# Patient Record
Sex: FEMALE | Race: BLACK OR AFRICAN AMERICAN | NOT HISPANIC OR LATINO | Employment: FULL TIME | URBAN - METROPOLITAN AREA
[De-identification: names, ages, dates, MRNs, and addresses within clinical notes are randomized per-mention and may not be internally consistent; named-entity substitution may affect disease eponyms.]

---

## 2018-09-12 ENCOUNTER — HOSPITAL ENCOUNTER (EMERGENCY)
Facility: HOSPITAL | Age: 38
Discharge: HOME | End: 2018-09-12
Attending: EMERGENCY MEDICINE
Payer: MEDICAID

## 2018-09-12 VITALS
HEIGHT: 62 IN | DIASTOLIC BLOOD PRESSURE: 73 MMHG | HEART RATE: 90 BPM | RESPIRATION RATE: 20 BRPM | WEIGHT: 135 LBS | BODY MASS INDEX: 24.84 KG/M2 | OXYGEN SATURATION: 97 % | SYSTOLIC BLOOD PRESSURE: 108 MMHG | TEMPERATURE: 98 F

## 2018-09-12 DIAGNOSIS — M25.539 WRIST PAIN: ICD-10-CM

## 2018-09-12 LAB
B-HCG UR QL: NEGATIVE
CTP QC/QA: YES

## 2018-09-12 PROCEDURE — 25000003 PHARM REV CODE 250: Performed by: PHYSICIAN ASSISTANT

## 2018-09-12 PROCEDURE — 99283 EMERGENCY DEPT VISIT LOW MDM: CPT | Mod: 25

## 2018-09-12 PROCEDURE — 81025 URINE PREGNANCY TEST: CPT | Performed by: NURSE PRACTITIONER

## 2018-09-12 RX ORDER — IBUPROFEN 400 MG/1
400 TABLET ORAL
Status: COMPLETED | OUTPATIENT
Start: 2018-09-12 | End: 2018-09-12

## 2018-09-12 RX ORDER — ACETAMINOPHEN 500 MG
500 TABLET ORAL EVERY 6 HOURS PRN
COMMUNITY
End: 2020-05-26 | Stop reason: SDUPTHER

## 2018-09-12 RX ADMIN — IBUPROFEN 400 MG: 400 TABLET ORAL at 03:09

## 2018-09-12 NOTE — DISCHARGE INSTRUCTIONS
Please wear your splint on the wrist to provide relief of pain.    You can also take Ibuprofen or Naproxen for pain.    Follow-up with hand surgery for further evaluation for carpal tunnel syndrome.    Return to the ER for any concerns.

## 2018-09-12 NOTE — ED TRIAGE NOTES
Pt states the pain in her right hand has been going on for the last 2 month. Today her right thumb and wrist hurts bad.

## 2018-09-12 NOTE — ED PROVIDER NOTES
Encounter Date: 9/12/2018    SCRIBE #1 NOTE: IMart am scribing for, and in the presence of,  Dayana Smith PA-C. I have scribed the following portions of the note - Other sections scribed: HPI, ROS.       History     Chief Complaint   Patient presents with    Wrist Pain     Pt reports pain and swelling to right wrist x2 months. Pt states she does a lot of lifting for her job. Took tylenol 3hrs pta     CC: Wrist Pain    HPI: 38 year old female  has no past medical history on file presents to the ED for evaluation of a 2 month history of right sided wrist pain that radiates to 1st and 2nd metacarpal. Pt reports pain is exacerbated with certain movements. 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 Pt has been using Tylenol Extra Strength w/ no relief. No other symptoms reported.       The history is provided by the patient. No  was used.     Review of patient's allergies indicates:  No Known Allergies  History reviewed. No pertinent past medical history.  History reviewed. No pertinent surgical history.  History reviewed. No pertinent family history.  Social History     Tobacco Use    Smoking status: Current Every Day Smoker     Packs/day: 1.00     Types: Cigarettes    Smokeless tobacco: Never Used    Tobacco comment: 1 pack of cigarettes will last the pt 2 days.   Substance Use Topics    Alcohol use: No     Frequency: Never    Drug use: No     Review of Systems   Constitutional: Negative for chills and fever.   HENT: Negative for congestion, ear pain and sore throat.    Eyes: Negative for pain.   Respiratory: Negative for shortness of breath.    Cardiovascular: Negative for chest pain.   Gastrointestinal: Negative for abdominal pain, constipation, diarrhea and nausea.   Genitourinary: Negative for decreased urine volume, dysuria, vaginal bleeding and vaginal discharge.   Musculoskeletal: Negative for back pain and neck pain.        (+) right wrist pain that radiates to  1st and 2nd metacarpals   Skin: Negative for rash.   Neurological: Negative for weakness.   Hematological: Does not bruise/bleed easily.   Psychiatric/Behavioral: Negative for confusion.       Physical Exam     Initial Vitals [09/12/18 1505]   BP Pulse Resp Temp SpO2   102/64 (!) 115 17 98.4 °F (36.9 °C) 98 %      MAP       --         Physical Exam    Nursing note and vitals reviewed.  Constitutional: Vital signs are normal. She appears well-developed and well-nourished. She is not diaphoretic. She is cooperative.  Non-toxic appearance. She does not have a sickly appearance. She does not appear ill. No distress.   HENT:   Head: Normocephalic and atraumatic.   Right Ear: Tympanic membrane, external ear and ear canal normal.   Left Ear: Tympanic membrane, external ear and ear canal normal.   Nose: Nose normal.   Mouth/Throat: Uvula is midline, oropharynx is clear and moist and mucous membranes are normal. No oropharyngeal exudate.   Eyes: Conjunctivae, EOM and lids are normal. Pupils are equal, round, and reactive to light.   Neck: Trachea normal, normal range of motion, full passive range of motion without pain and phonation normal. Neck supple.   Cardiovascular: Normal rate, regular rhythm, normal heart sounds and intact distal pulses. Exam reveals no gallop and no friction rub.    No murmur heard.  Pulses:       Radial pulses are 2+ on the right side, and 2+ on the left side.   Pulmonary/Chest: Effort normal and breath sounds normal. No respiratory distress. She has no decreased breath sounds. She has no wheezes. She has no rhonchi. She has no rales.   Abdominal: Soft. Normal appearance and bowel sounds are normal. She exhibits no distension and no mass. There is no tenderness.   Musculoskeletal: Normal range of motion.        Right wrist: She exhibits normal range of motion, no tenderness, no bony tenderness, no swelling, no effusion, no crepitus, no deformity and no laceration.        Left wrist: Normal.   No  tenderness to palpation or with ROM of the right hand or wrist. Full ROM of bilateral upper extremities. No swelling or obvious deformities. No crepitus or effusion. No laceration. Peripheral pulses intact. Strength and sensation intact. + Phalen's test.   Neurological: She is alert and oriented to person, place, and time. She has normal strength.   Skin: Skin is warm and dry. Capillary refill takes less than 2 seconds. No rash noted.   Psychiatric: She has a normal mood and affect. Her speech is normal and behavior is normal. Judgment and thought content normal. Cognition and memory are normal.         ED Course   Procedures  Labs Reviewed   POCT URINE PREGNANCY          Imaging Results          X-Ray Wrist Complete Right (Final result)  Result time 09/12/18 16:05:24    Final result by Cali Gonzalez MD (09/12/18 16:05:24)                 Impression:      No acute process.      Electronically signed by: Cali Gonzalez MD  Date:    09/12/2018  Time:    16:05             Narrative:    EXAMINATION:  XR WRIST COMPLETE 3 VIEWS RIGHT    CLINICAL HISTORY:  Pain in unspecified wrist    TECHNIQUE:  PA, lateral, and oblique views of the right wrist were performed.    COMPARISON:  None    FINDINGS:  No evidence for acute fracture, dislocation or destructive process.  Joint spaces are fairly well maintained.  Surrounding soft tissues are unremarkable.  No radiopaque retained foreign body.                                 Medical Decision Making:   Initial Assessment:   38 y.o. Female who presents for consideration of atraumatic right wrist pain. She reports paresthesias and pain of the right thumb and index finger x2 months.  She reports that she lifts heavy patients at work and associates her symptoms with this.  She reports attempted treatment with Tylenol extra-strength with no relief.  Denies any further symptoms.   ED Management:  Exam findings: No tenderness to palpation or with ROM of the right hand or wrist. Full ROM  of bilateral upper extremities. No swelling or obvious deformities. No crepitus or effusion. No laceration. Peripheral pulses intact. Strength and sensation intact. + Phalen's test.    Results: UPt negative  Xray right wrist unrevealing for fracture or dislocation.     My overall impression: Right wrist pain; likely carpal tunnel.  DDx: wrist pain, hand pain, carpal tunnel, arthritis, other    ED course: Motrin and velcro wrist splint. Patient stable for discharge. Will recommend follow-up with hand surgery.     The diagnosis and treatment plan have been discussed with the patient. All questions and concerns have been addressed. An educational information sheet was given to the patient prior to discharge.     Dayana Smith PA-C    Other:   I have discussed this case with another health care provider.       <> Summary of the Discussion: Discussed with Dr. Jaquez who agrees with diagnosis and treatment plan.             Scribe Attestation:   Scribe #1: I performed the above scribed service and the documentation accurately describes the services I performed. I attest to the accuracy of the note.    Attending Attestation:     Physician Attestation Statement for NP/PA:   I reviewed the chart but I did not personally examine the patient. The face to face encounter was performed by the NP/PA.        Physician Attestation for Scribe:  Physician Attestation Statement for Scribe #1: I, Dayana Smith PA-C, reviewed documentation, as scribed by Mart Barraza in my presence, and it is both accurate and complete.                    Clinical Impression:   The encounter diagnosis was Wrist pain.      Disposition:   Disposition: Discharged  Condition: Stable                        Dayana Smith PA-C  09/12/18 1852       Carlos Jaquez MD  09/12/18 [id number]

## 2018-09-12 NOTE — ED NOTES
Urine cup give and explained to the pt a urine sample is needed. She verbalized an understanding of the information given.

## 2019-05-08 ENCOUNTER — HOSPITAL ENCOUNTER (EMERGENCY)
Facility: HOSPITAL | Age: 39
Discharge: HOME | End: 2019-05-08
Attending: EMERGENCY MEDICINE
Payer: MEDICAID

## 2019-05-08 VITALS
HEIGHT: 62 IN | BODY MASS INDEX: 23 KG/M2 | RESPIRATION RATE: 16 BRPM | TEMPERATURE: 99 F | OXYGEN SATURATION: 100 % | DIASTOLIC BLOOD PRESSURE: 61 MMHG | SYSTOLIC BLOOD PRESSURE: 95 MMHG | HEART RATE: 83 BPM | WEIGHT: 125 LBS

## 2019-05-08 DIAGNOSIS — N94.89 PELVIC CONGESTION SYNDROME: ICD-10-CM

## 2019-05-08 DIAGNOSIS — N39.0 URINARY TRACT INFECTION WITHOUT HEMATURIA, SITE UNSPECIFIED: Primary | ICD-10-CM

## 2019-05-08 DIAGNOSIS — R06.02 SHORTNESS OF BREATH: ICD-10-CM

## 2019-05-08 LAB
ALBUMIN SERPL BCP-MCNC: 3.9 G/DL (ref 3.5–5.2)
ALP SERPL-CCNC: 104 U/L (ref 55–135)
ALT SERPL W/O P-5'-P-CCNC: 7 U/L (ref 10–44)
ANION GAP SERPL CALC-SCNC: 9 MMOL/L (ref 8–16)
AST SERPL-CCNC: 15 U/L (ref 10–40)
B-HCG UR QL: NEGATIVE
BACTERIA #/AREA URNS HPF: ABNORMAL /HPF
BASOPHILS # BLD AUTO: 0.05 K/UL (ref 0–0.2)
BASOPHILS NFR BLD: 0.8 % (ref 0–1.9)
BILIRUB SERPL-MCNC: 0.3 MG/DL (ref 0.1–1)
BILIRUB UR QL STRIP: NEGATIVE
BUN SERPL-MCNC: 18 MG/DL (ref 6–20)
BURR CELLS BLD QL SMEAR: ABNORMAL
CALCIUM SERPL-MCNC: 9.6 MG/DL (ref 8.7–10.5)
CHLORIDE SERPL-SCNC: 107 MMOL/L (ref 95–110)
CLARITY UR: ABNORMAL
CO2 SERPL-SCNC: 25 MMOL/L (ref 23–29)
COLOR UR: YELLOW
CREAT SERPL-MCNC: 0.8 MG/DL (ref 0.5–1.4)
CTP QC/QA: YES
DIFFERENTIAL METHOD: ABNORMAL
EOSINOPHIL # BLD AUTO: 0.2 K/UL (ref 0–0.5)
EOSINOPHIL NFR BLD: 2.4 % (ref 0–8)
ERYTHROCYTE [DISTWIDTH] IN BLOOD BY AUTOMATED COUNT: 19.2 % (ref 11.5–14.5)
EST. GFR  (AFRICAN AMERICAN): >60 ML/MIN/1.73 M^2
EST. GFR  (NON AFRICAN AMERICAN): >60 ML/MIN/1.73 M^2
GLUCOSE SERPL-MCNC: 78 MG/DL (ref 70–110)
GLUCOSE UR QL STRIP: NEGATIVE
HCT VFR BLD AUTO: 35.5 % (ref 37–48.5)
HGB BLD-MCNC: 10.9 G/DL (ref 12–16)
HGB UR QL STRIP: ABNORMAL
HIV1+2 IGG SERPL QL IA.RAPID: NEGATIVE
HYPOCHROMIA BLD QL SMEAR: ABNORMAL
KETONES UR QL STRIP: NEGATIVE
LEUKOCYTE ESTERASE UR QL STRIP: ABNORMAL
LIPASE SERPL-CCNC: 80 U/L (ref 4–60)
LYMPHOCYTES # BLD AUTO: 2.8 K/UL (ref 1–4.8)
LYMPHOCYTES NFR BLD: 43.2 % (ref 18–48)
MCH RBC QN AUTO: 21.8 PG (ref 27–31)
MCHC RBC AUTO-ENTMCNC: 30.7 G/DL (ref 32–36)
MCV RBC AUTO: 71 FL (ref 82–98)
MICROSCOPIC COMMENT: ABNORMAL
MONOCYTES # BLD AUTO: 0.7 K/UL (ref 0.3–1)
MONOCYTES NFR BLD: 11 % (ref 4–15)
NEUTROPHILS # BLD AUTO: 2.7 K/UL (ref 1.8–7.7)
NEUTROPHILS NFR BLD: 42.8 % (ref 38–73)
NITRITE UR QL STRIP: NEGATIVE
OVALOCYTES BLD QL SMEAR: ABNORMAL
PH UR STRIP: 6 [PH] (ref 5–8)
PLATELET # BLD AUTO: 258 K/UL (ref 150–350)
PMV BLD AUTO: 10.1 FL (ref 9.2–12.9)
POLYCHROMASIA BLD QL SMEAR: ABNORMAL
POTASSIUM SERPL-SCNC: 4 MMOL/L (ref 3.5–5.1)
PROT SERPL-MCNC: 8.1 G/DL (ref 6–8.4)
PROT UR QL STRIP: NEGATIVE
RBC # BLD AUTO: 4.99 M/UL (ref 4–5.4)
RBC #/AREA URNS HPF: 5 /HPF (ref 0–4)
SODIUM SERPL-SCNC: 141 MMOL/L (ref 136–145)
SP GR UR STRIP: 1.01 (ref 1–1.03)
SQUAMOUS #/AREA URNS HPF: 15 /HPF
URN SPEC COLLECT METH UR: ABNORMAL
UROBILINOGEN UR STRIP-ACNC: NEGATIVE EU/DL
WBC # BLD AUTO: 6.36 K/UL (ref 3.9–12.7)
WBC #/AREA URNS HPF: 20 /HPF (ref 0–5)

## 2019-05-08 PROCEDURE — 96361 HYDRATE IV INFUSION ADD-ON: CPT

## 2019-05-08 PROCEDURE — 85025 COMPLETE CBC W/AUTO DIFF WBC: CPT

## 2019-05-08 PROCEDURE — 25000003 PHARM REV CODE 250: Performed by: EMERGENCY MEDICINE

## 2019-05-08 PROCEDURE — 63600175 PHARM REV CODE 636 W HCPCS: Performed by: EMERGENCY MEDICINE

## 2019-05-08 PROCEDURE — 80053 COMPREHEN METABOLIC PANEL: CPT

## 2019-05-08 PROCEDURE — 25500020 PHARM REV CODE 255: Performed by: EMERGENCY MEDICINE

## 2019-05-08 PROCEDURE — 81000 URINALYSIS NONAUTO W/SCOPE: CPT

## 2019-05-08 PROCEDURE — 81025 URINE PREGNANCY TEST: CPT | Performed by: EMERGENCY MEDICINE

## 2019-05-08 PROCEDURE — 99285 EMERGENCY DEPT VISIT HI MDM: CPT | Mod: 25

## 2019-05-08 PROCEDURE — 83690 ASSAY OF LIPASE: CPT

## 2019-05-08 PROCEDURE — 96374 THER/PROPH/DIAG INJ IV PUSH: CPT

## 2019-05-08 PROCEDURE — 81003 URINALYSIS AUTO W/O SCOPE: CPT

## 2019-05-08 PROCEDURE — 86703 HIV-1/HIV-2 1 RESULT ANTBDY: CPT

## 2019-05-08 RX ORDER — CEPHALEXIN 500 MG/1
500 CAPSULE ORAL EVERY 12 HOURS
Qty: 14 CAPSULE | Refills: 0 | Status: SHIPPED | OUTPATIENT
Start: 2019-05-08 | End: 2019-05-15

## 2019-05-08 RX ORDER — ONDANSETRON 2 MG/ML
4 INJECTION INTRAMUSCULAR; INTRAVENOUS
Status: COMPLETED | OUTPATIENT
Start: 2019-05-08 | End: 2019-05-08

## 2019-05-08 RX ADMIN — ONDANSETRON 4 MG: 2 INJECTION INTRAMUSCULAR; INTRAVENOUS at 06:05

## 2019-05-08 RX ADMIN — SODIUM CHLORIDE 1000 ML: 0.9 INJECTION, SOLUTION INTRAVENOUS at 06:05

## 2019-05-08 RX ADMIN — IOHEXOL 75 ML: 350 INJECTION, SOLUTION INTRAVENOUS at 07:05

## 2019-05-08 NOTE — ED PROVIDER NOTES
"Encounter Date: 5/8/2019    SCRIBE #1 NOTE: I, Sheela Baldwin, am scribing for, and in the presence of,  Staci Rosenberg MD. I have scribed the following portions of the note - Other sections scribed: ROS, HPI, and PE.       History     Chief Complaint   Patient presents with    generalized weakness     Pt states "i think i was exposed to moldand now i'm weak." Pt also c/o of headache and abd pain. Pt denies takign any medication for symptoms.      CC: Generalized Weakness    HPI: This 39 y.o. female with presents to the ED complaining of generalized weakness, SOB, cough, sweats, chills, abdominal pain, diarrhea, sore throat, cough, headache and decrease in appetite for last 2-3 weeks after moving into a new house containing mold. Sx are mild and constant. Denies any other sx. No reported prior medical intervention. No prior similar episodes.     The history is provided by the patient. No  was used.     Review of patient's allergies indicates:  No Known Allergies  No past medical history on file.  No past surgical history on file.  No family history on file.  Social History     Tobacco Use    Smoking status: Current Every Day Smoker     Packs/day: 1.00     Types: Cigarettes    Smokeless tobacco: Never Used    Tobacco comment: 1 pack of cigarettes will last the pt 2 days.   Substance Use Topics    Alcohol use: No     Frequency: Never    Drug use: No     Review of Systems   Constitutional: Positive for appetite change and chills. Negative for fever.        (+) sweats   HENT: Positive for sore throat. Negative for congestion, ear pain and rhinorrhea.    Eyes: Negative for pain and visual disturbance.   Respiratory: Positive for cough and shortness of breath.    Cardiovascular: Negative for chest pain.   Gastrointestinal: Positive for abdominal pain and diarrhea. Negative for nausea and vomiting.   Genitourinary: Negative for dysuria.   Musculoskeletal: Negative for back pain and neck pain.   Skin: " Negative for rash.   Neurological: Positive for headaches.       Physical Exam     Initial Vitals   BP Pulse Resp Temp SpO2   05/08/19 1725 05/08/19 1725 05/08/19 1725 05/08/19 1725 05/08/19 2010   98/68 102 15 98.4 °F (36.9 °C) 100 %      MAP       --                Physical Exam    Nursing note and vitals reviewed.  Constitutional: She appears well-developed and well-nourished. She is not diaphoretic.   HENT:   Head: Normocephalic and atraumatic.   Mouth/Throat: Oropharynx is clear and moist.   Eyes: Conjunctivae and EOM are normal. Pupils are equal, round, and reactive to light.   Neck: Normal range of motion. Neck supple.   Cardiovascular: Normal rate, regular rhythm, normal heart sounds and intact distal pulses.   No murmur heard.  Pulmonary/Chest: Breath sounds normal. No respiratory distress. She has no wheezes. She has no rhonchi. She has no rales.   Abdominal: Soft. Bowel sounds are normal. She exhibits no distension. There is tenderness. There is no rebound and no guarding.   Generalized, mild. No rebound or guarding.    Musculoskeletal: Normal range of motion. She exhibits no edema or tenderness.   Neurological: She is alert and oriented to person, place, and time. She has normal strength.   Skin: Skin is warm and dry. No erythema. No pallor.   Psychiatric: She has a normal mood and affect.         ED Course   Procedures  Labs Reviewed   CBC W/ AUTO DIFFERENTIAL - Abnormal; Notable for the following components:       Result Value    Hemoglobin 10.9 (*)     Hematocrit 35.5 (*)     Mean Corpuscular Volume 71 (*)     Mean Corpuscular Hemoglobin 21.8 (*)     Mean Corpuscular Hemoglobin Conc 30.7 (*)     RDW 19.2 (*)     All other components within normal limits   COMPREHENSIVE METABOLIC PANEL - Abnormal; Notable for the following components:    ALT 7 (*)     All other components within normal limits   URINALYSIS - Abnormal; Notable for the following components:    Appearance, UA Hazy (*)     Occult Blood UA  1+ (*)     Leukocytes, UA 3+ (*)     All other components within normal limits   LIPASE - Abnormal; Notable for the following components:    Lipase 80 (*)     All other components within normal limits   URINALYSIS MICROSCOPIC - Abnormal; Notable for the following components:    RBC, UA 5 (*)     WBC, UA 20 (*)     Bacteria Few (*)     All other components within normal limits   RAPID HIV   POCT URINE PREGNANCY          Imaging Results          CT Abdomen Pelvis With Contrast (Final result)  Result time 05/08/19 19:13:44    Final result by Jocelynn Maldonado MD (05/08/19 19:13:44)                 Impression:      Enlarged left gonadal vein with enlarged tortuous vessels in the left adnexal region consistent with pelvic congestion syndrome.    Normal appendix.  No urolithiasis or hydronephrosis.      Electronically signed by: Jocelynn Maldonado  Date:    05/08/2019  Time:    19:13             Narrative:    EXAMINATION:  CT ABDOMEN PELVIS WITH CONTRAST    CLINICAL HISTORY:  RLQ pain, appendicitis suspected;    TECHNIQUE:  Low dose axial images, sagittal and coronal reformations were obtained from the lung bases to the pubic symphysis following the IV administration of 75 mL of Omnipaque 350 without oral contrast.    COMPARISON:  None.    FINDINGS:  Chest: The visualized heart appears normal.  No pericardial effusion.  The visualized soft is appears normal.  Visualized lung bases are grossly normal.  No pneumothorax or pleural effusion or interstitial edema.    Abdomen: The liver, spleen, pancreas, adrenal glands, gallbladder appear normal.  The common bile duct measures about 5 mm probably within normal limits.  The portal veins and hepatic veins and splenic vein show normal enhancement.  The abdominal aorta and IVC appear normal.  There is a enlarged left gonadal vein.  This is nonspecific finding but can be seen in setting of pelvic congestion syndrome.  Recommend clinical correlation.    The kidneys enhance symmetrically  appear normal.  No ureteral dilation or calculus found.  No hydronephrosis or nephrolithiasis seen.    Pelvis: The urinary bladder appears grossly normal.  The endometrium is somewhat indistinct measuring up to about 12 mm which could be normal depending on the phase of the menstrual cycle.  Otherwise, the uterus and right adnexal region appears normal.  There are enlarged irregular vessels of the left adnexal region again potentially indicative of pelvic congestion syndrome.  No inguinal hernia.  No pelvic adenopathy    Bowel: The appendix appears normal on axial image 94 and coronal image 39-0 52.  The terminal ileum appears normal coronal image 37.  No bowel dilation or wall thickening or pneumatosis or free air.  No abscess collection.  No definite ascites.    Musculoskeletal.  The sacrum and SI joints appear normal.  The visualized femurs appear intact without osteonecrosis.  No rib fracture seen.  No compression deformity or subluxation of the visualized spine.  No endplate erosion.                               X-Ray Chest PA And Lateral (Final result)  Result time 05/08/19 18:44:35    Final result by Mathew Huertas MD (05/08/19 18:44:35)                 Impression:      No acute abnormality.      Electronically signed by: Mathew Huertas MD  Date:    05/08/2019  Time:    18:44             Narrative:    EXAMINATION:  XR CHEST PA AND LATERAL    CLINICAL HISTORY:  Shortness of breath    TECHNIQUE:  PA and lateral views of the chest were performed.    COMPARISON:  None    FINDINGS:  The lungs are clear, with normal appearance of pulmonary vasculature and no pleural effusion or pneumothorax.    The cardiac silhouette is normal in size. The hilar and mediastinal contours are unremarkable.    Bones are intact.                                 Medical Decision Making:   Initial Assessment:   This is an emergent evaluation of 39-year-old woman who presented to the emergency department today secondary to generalized  malaise as well as nausea and vomiting  Differential Diagnosis:   Differential diagnoses include infectious process, anemia, gastroenteritis, colitis, diverticulitis, amongst others.  Clinical Tests:   Lab Tests: Ordered and Reviewed  Radiological Study: Ordered and Reviewed  ED Management:  On physical examination, patient was in no acute distress. Heart and lung sounds were within normal limits.  Abdomen was soft, with generalized tenderness throughout without any focal tenderness to palpation. She was fully neurologically intact without sensory, motor, or  visual deficits.  Labs were obtained and showed anemia which she states she is aware she has.  She additionally has a urinary tract infection for which I will treat with Keflex.  CT scan was obtained and showed possible pelvic congestion syndrome.  I discussed this with the patient who stated she does sometimes have dyspareunia.  I advised her to follow with primary care regarding this.  Her lipase was mildly elevated at 80 however there were no signs of pancreatitis on CT scan.  Patient was tolerating p.o. in the emergency department without difficulty.  I believe she is stable for discharge. She will be discharged at this time.  She was given strict return precautions including development of intractable nausea or vomiting, fevers, chills, or for any other concerning symptoms.    Staci Rosenberg MD  8:13 PM  5/8/2019              Scribe Attestation:   Scribe #1: I performed the above scribed service and the documentation accurately describes the services I performed. I attest to the accuracy of the note.    Attending Attestation:           Physician Attestation for Scribe:  Physician Attestation Statement for Scribe #1: I, Staci Rosenberg MD, reviewed documentation, as scribed by Sheela Baldwin in my presence, and it is both accurate and complete.                    Clinical Impression:       ICD-10-CM ICD-9-CM   1. Urinary tract infection without hematuria, site  unspecified N39.0 599.0   2. Shortness of breath R06.02 786.05   3. Pelvic congestion syndrome N94.89 625.5                                Staci Rosenberg MD  05/08/19 2014

## 2019-05-08 NOTE — ED NOTES
"No past medical history on file. generalized weakness (pt states "i think i was exposed to mold and now i'm weak,nauseated with vomiting x3 and sob  And also c/o of headache and abd pain. Pt states that she took Ibuprofen 800 mg this am .  "

## 2019-10-06 ENCOUNTER — HOSPITAL ENCOUNTER (EMERGENCY)
Facility: HOSPITAL | Age: 39
Discharge: HOME | End: 2019-10-07
Attending: EMERGENCY MEDICINE

## 2019-10-06 DIAGNOSIS — R51.9 CHRONIC NONINTRACTABLE HEADACHE, UNSPECIFIED HEADACHE TYPE: ICD-10-CM

## 2019-10-06 DIAGNOSIS — G89.29 CHRONIC NONINTRACTABLE HEADACHE, UNSPECIFIED HEADACHE TYPE: ICD-10-CM

## 2019-10-06 DIAGNOSIS — R74.01 TRANSAMINITIS: ICD-10-CM

## 2019-10-06 DIAGNOSIS — R07.89 CHEST WALL PAIN: Primary | ICD-10-CM

## 2019-10-06 LAB
B-HCG UR QL: NEGATIVE
CTP QC/QA: YES

## 2019-10-06 PROCEDURE — 99285 EMERGENCY DEPT VISIT HI MDM: CPT | Mod: 25

## 2019-10-06 PROCEDURE — 93005 ELECTROCARDIOGRAM TRACING: CPT

## 2019-10-06 PROCEDURE — 93010 EKG 12-LEAD: ICD-10-PCS | Mod: ,,, | Performed by: INTERNAL MEDICINE

## 2019-10-06 PROCEDURE — 93010 ELECTROCARDIOGRAM REPORT: CPT | Mod: ,,, | Performed by: INTERNAL MEDICINE

## 2019-10-06 PROCEDURE — 81025 URINE PREGNANCY TEST: CPT | Performed by: EMERGENCY MEDICINE

## 2019-10-07 VITALS
DIASTOLIC BLOOD PRESSURE: 88 MMHG | OXYGEN SATURATION: 99 % | BODY MASS INDEX: 23 KG/M2 | SYSTOLIC BLOOD PRESSURE: 158 MMHG | HEIGHT: 62 IN | TEMPERATURE: 99 F | WEIGHT: 125 LBS | RESPIRATION RATE: 20 BRPM | HEART RATE: 99 BPM

## 2019-10-07 LAB
ALBUMIN SERPL BCP-MCNC: 3.4 G/DL (ref 3.5–5.2)
ALP SERPL-CCNC: 136 U/L (ref 55–135)
ALT SERPL W/O P-5'-P-CCNC: 101 U/L (ref 10–44)
ANION GAP SERPL CALC-SCNC: 7 MMOL/L (ref 8–16)
ANISOCYTOSIS BLD QL SMEAR: SLIGHT
AST SERPL-CCNC: 106 U/L (ref 10–40)
BASOPHILS # BLD AUTO: 0.02 K/UL (ref 0–0.2)
BASOPHILS NFR BLD: 0.6 % (ref 0–1.9)
BILIRUB SERPL-MCNC: 0.6 MG/DL (ref 0.1–1)
BNP SERPL-MCNC: 155 PG/ML (ref 0–99)
BUN SERPL-MCNC: 14 MG/DL (ref 6–20)
CALCIUM SERPL-MCNC: 9.9 MG/DL (ref 8.7–10.5)
CHLORIDE SERPL-SCNC: 108 MMOL/L (ref 95–110)
CO2 SERPL-SCNC: 27 MMOL/L (ref 23–29)
CREAT SERPL-MCNC: 0.6 MG/DL (ref 0.5–1.4)
DIFFERENTIAL METHOD: ABNORMAL
EOSINOPHIL # BLD AUTO: 0.1 K/UL (ref 0–0.5)
EOSINOPHIL NFR BLD: 4.3 % (ref 0–8)
ERYTHROCYTE [DISTWIDTH] IN BLOOD BY AUTOMATED COUNT: 17.9 % (ref 11.5–14.5)
EST. GFR  (AFRICAN AMERICAN): >60 ML/MIN/1.73 M^2
EST. GFR  (NON AFRICAN AMERICAN): >60 ML/MIN/1.73 M^2
GIANT PLATELETS BLD QL SMEAR: PRESENT
GLUCOSE SERPL-MCNC: 119 MG/DL (ref 70–110)
HCT VFR BLD AUTO: 31.6 % (ref 37–48.5)
HGB BLD-MCNC: 9.6 G/DL (ref 12–16)
HYPOCHROMIA BLD QL SMEAR: ABNORMAL
LIPASE SERPL-CCNC: 93 U/L (ref 4–60)
LYMPHOCYTES # BLD AUTO: 1.5 K/UL (ref 1–4.8)
LYMPHOCYTES NFR BLD: 45.9 % (ref 18–48)
MAGNESIUM SERPL-MCNC: 1.8 MG/DL (ref 1.6–2.6)
MCH RBC QN AUTO: 20.6 PG (ref 27–31)
MCHC RBC AUTO-ENTMCNC: 30.4 G/DL (ref 32–36)
MCV RBC AUTO: 68 FL (ref 82–98)
MONOCYTES # BLD AUTO: 0.6 K/UL (ref 0.3–1)
MONOCYTES NFR BLD: 18.5 % (ref 4–15)
NEUTROPHILS # BLD AUTO: 1 K/UL (ref 1.8–7.7)
NEUTROPHILS NFR BLD: 30.7 % (ref 38–73)
OVALOCYTES BLD QL SMEAR: ABNORMAL
PLATELET # BLD AUTO: ABNORMAL K/UL (ref 150–350)
PLATELET BLD QL SMEAR: ABNORMAL
PMV BLD AUTO: ABNORMAL FL (ref 9.2–12.9)
POIKILOCYTOSIS BLD QL SMEAR: SLIGHT
POLYCHROMASIA BLD QL SMEAR: ABNORMAL
POTASSIUM SERPL-SCNC: 4.3 MMOL/L (ref 3.5–5.1)
PROT SERPL-MCNC: 6.6 G/DL (ref 6–8.4)
RBC # BLD AUTO: 4.65 M/UL (ref 4–5.4)
SODIUM SERPL-SCNC: 142 MMOL/L (ref 136–145)
TROPONIN I SERPL DL<=0.01 NG/ML-MCNC: <0.006 NG/ML (ref 0–0.03)
WBC # BLD AUTO: 3.29 K/UL (ref 3.9–12.7)

## 2019-10-07 PROCEDURE — 85025 COMPLETE CBC W/AUTO DIFF WBC: CPT

## 2019-10-07 PROCEDURE — 84484 ASSAY OF TROPONIN QUANT: CPT

## 2019-10-07 PROCEDURE — 83690 ASSAY OF LIPASE: CPT

## 2019-10-07 PROCEDURE — 96374 THER/PROPH/DIAG INJ IV PUSH: CPT

## 2019-10-07 PROCEDURE — 83735 ASSAY OF MAGNESIUM: CPT

## 2019-10-07 PROCEDURE — 83880 ASSAY OF NATRIURETIC PEPTIDE: CPT

## 2019-10-07 PROCEDURE — 96361 HYDRATE IV INFUSION ADD-ON: CPT

## 2019-10-07 PROCEDURE — 80053 COMPREHEN METABOLIC PANEL: CPT

## 2019-10-07 PROCEDURE — 63600175 PHARM REV CODE 636 W HCPCS: Performed by: EMERGENCY MEDICINE

## 2019-10-07 PROCEDURE — 96375 TX/PRO/DX INJ NEW DRUG ADDON: CPT

## 2019-10-07 RX ORDER — IBUPROFEN 600 MG/1
600 TABLET ORAL EVERY 6 HOURS PRN
Qty: 20 TABLET | Refills: 0 | Status: SHIPPED | OUTPATIENT
Start: 2019-10-07 | End: 2020-05-26 | Stop reason: SDUPTHER

## 2019-10-07 RX ORDER — DIPHENHYDRAMINE HYDROCHLORIDE 50 MG/ML
12.5 INJECTION INTRAMUSCULAR; INTRAVENOUS
Status: COMPLETED | OUTPATIENT
Start: 2019-10-07 | End: 2019-10-07

## 2019-10-07 RX ORDER — PREDNISONE 5 MG/1
10 TABLET ORAL
Status: COMPLETED | OUTPATIENT
Start: 2019-10-07 | End: 2019-10-07

## 2019-10-07 RX ORDER — METOCLOPRAMIDE HYDROCHLORIDE 5 MG/ML
10 INJECTION INTRAMUSCULAR; INTRAVENOUS
Status: COMPLETED | OUTPATIENT
Start: 2019-10-07 | End: 2019-10-07

## 2019-10-07 RX ADMIN — SODIUM CHLORIDE 1000 ML: 0.9 INJECTION, SOLUTION INTRAVENOUS at 12:10

## 2019-10-07 RX ADMIN — PREDNISONE 10 MG: 5 TABLET ORAL at 12:10

## 2019-10-07 RX ADMIN — METOCLOPRAMIDE 10 MG: 5 INJECTION, SOLUTION INTRAMUSCULAR; INTRAVENOUS at 12:10

## 2019-10-07 RX ADMIN — DIPHENHYDRAMINE HYDROCHLORIDE 12.5 MG: 50 INJECTION INTRAMUSCULAR; INTRAVENOUS at 12:10

## 2019-10-07 NOTE — ED TRIAGE NOTES
Pt presents to ER via personal transportation from home with c/o sharp, constant, reproducible, left sided chest pain that radiates to left side/rib area and left arm that started around 1400 yesterday while laying and watching tv. Denies any injury/trauma, SOB, diaphoresis.  Pt also c/o diffuse headache x several days. Hx chronic headaches. Used tylenol without relief.

## 2019-10-07 NOTE — ED PROVIDER NOTES
Encounter Date: 10/6/2019    SCRIBE #1 NOTE: I, Clifton Perez, am scribing for, and in the presence of, Rad Aguayo MD. Other sections scribed: HPI, ROS, PE.       History     Chief Complaint   Patient presents with    Chest Pain     since Saturday morning on left sided, radiating to upper back, reports SOB and nausea, reports pain is constant in nature,     Headache     reports hx of chronic headaches, denies taking medication prior to arrival to pain      This is a 39 y.o. Female who presents to the ED complaining of left sided chest pain that radiates to the upper back with associated SOB, nausea and headache. Pain is constant and is rated 8/10. She states that she always has headaches at least 2-3 times per day and this headache is like her chronic headaches that she has had before in the past. Denies any fever, recent travel, or any other worsening or alleviating factors. The pt mentioned having decreased appetite due to the constant headaches. She currently smoke 1 pack of cigarettes per day.  Patient has not follow-up with primary care    The history is provided by the patient and medical records.     Review of patient's allergies indicates:  No Known Allergies  History reviewed. No pertinent past medical history.  History reviewed. No pertinent surgical history.  History reviewed. No pertinent family history.  Social History     Tobacco Use    Smoking status: Current Every Day Smoker     Packs/day: 1.00     Types: Cigarettes    Smokeless tobacco: Never Used    Tobacco comment: 1 pack of cigarettes will last the pt 2 days.   Substance Use Topics    Alcohol use: No     Frequency: Never    Drug use: No     Review of Systems   Constitutional: Negative for chills, diaphoresis and fever.   HENT: Negative for congestion and sore throat.    Eyes: Negative for visual disturbance.   Respiratory: Positive for shortness of breath. Negative for cough.    Cardiovascular: Positive for chest pain.   Gastrointestinal:  Positive for nausea. Negative for abdominal pain, blood in stool, diarrhea and vomiting.        No melena.   Genitourinary: Negative for dysuria, flank pain and hematuria.   Musculoskeletal: Positive for back pain.   Skin: Negative for rash and wound.   Neurological: Positive for headaches. Negative for dizziness, speech difficulty, weakness and numbness.   Psychiatric/Behavioral: Negative for confusion.   All other systems reviewed and are negative.      Physical Exam     Initial Vitals [10/06/19 2348]   BP Pulse Resp Temp SpO2   127/79 93 18 98.7 °F (37.1 °C) 96 %      MAP       --         Physical Exam    Nursing note and vitals reviewed.  Constitutional: She appears well-developed and well-nourished. She is not diaphoretic. No distress.   HENT:   Head: Normocephalic and atraumatic.   Nose: Nose normal.   Mouth/Throat: Oropharynx is clear and moist.   Eyes: Conjunctivae and EOM are normal. Pupils are equal, round, and reactive to light. Right eye exhibits no discharge. Left eye exhibits no discharge.   Neck: Normal range of motion. Neck supple. No thyromegaly present.   Cardiovascular: Normal rate, regular rhythm and normal heart sounds.   No murmur heard.  Pulmonary/Chest: No stridor. No respiratory distress. She has no wheezes. She has no rhonchi. She has no rales. She exhibits tenderness (reproduceable).   Abdominal: Soft. She exhibits no distension and no mass. There is no tenderness. There is no rebound and no guarding.   Musculoskeletal: Normal range of motion. She exhibits no edema or tenderness.   Neurological: She is alert and oriented to person, place, and time. She has normal strength. No cranial nerve deficit.   Skin: Skin is warm and dry. No rash noted. No erythema.   Psychiatric: She has a normal mood and affect. Her behavior is normal. Judgment and thought content normal.         ED Course   Procedures  Labs Reviewed   B-TYPE NATRIURETIC PEPTIDE - Abnormal; Notable for the following components:      "  Result Value     (*)     All other components within normal limits   COMPREHENSIVE METABOLIC PANEL - Abnormal; Notable for the following components:    Glucose 119 (*)     Albumin 3.4 (*)     Alkaline Phosphatase 136 (*)      (*)      (*)     Anion Gap 7 (*)     All other components within normal limits   CBC W/ AUTO DIFFERENTIAL - Abnormal; Notable for the following components:    WBC 3.29 (*)     Hemoglobin 9.6 (*)     Hematocrit 31.6 (*)     Mean Corpuscular Volume 68 (*)     Mean Corpuscular Hemoglobin 20.6 (*)     Mean Corpuscular Hemoglobin Conc 30.4 (*)     RDW 17.9 (*)     Gran # (ANC) 1.0 (*)     Gran% 30.7 (*)     Mono% 18.5 (*)     Platelet Estimate Clumped (*)     All other components within normal limits   LIPASE - Abnormal; Notable for the following components:    Lipase 93 (*)     All other components within normal limits   MAGNESIUM   TROPONIN I   POCT URINE PREGNANCY     EKG Readings: (Independently Interpreted)   Heart Rate: 99 BPM.   EKG done at 11:35 p.m. showed normal sinus rhythm rate of 99.  No ST elevation T-wave abnormality.  LVH.  Left atrial enlargement.  No ST elevation.  Normal axis.  Nonspecific EKG.  No EKG to compare to.       Imaging Results          X-Ray Chest PA And Lateral (Final result)  Result time 10/07/19 00:50:08    Final result by Ricardo Duarte MD (10/07/19 00:50:08)                 Impression:      No acute cardiopulmonary finding.      Electronically signed by: Ricardo Duarte MD  Date:    10/07/2019  Time:    00:50             Narrative:    EXAMINATION:  XR CHEST PA AND LATERAL    CLINICAL HISTORY:  Provided history is "  Chest pain, unspecified".    TECHNIQUE:  Frontal and lateral views of the chest were performed.    COMPARISON:  05/08/2019.    FINDINGS:  Cardiac wires overlie the chest.  Cardiac silhouette is not significantly enlarged.  No focal consolidation.  No sizable pleural effusion.  No pneumothorax.                               "   Medical Decision Making:   History:   Old Medical Records: I decided to obtain old medical records.  Initial Assessment:   39 year old patient with hx of headaches and smoke presenting secondary to chest pain. Patient is at lower risk of ACS due to risk factors and HPI with a heart score of 2. Patient has received an aspirin. EKG was reassuring and chest xray showed nothing acute. Most likely musculoskeletal cause of patient.     [email or web address]    Also considered but less likely:     PE: normal rate, no sob/recent immobilization/surgery/travel/family history. PERC 0  Pneumonia: chest xray negative. No fever. No cough and lungs non consistent with pna  Tamponade: unlikely due to chest xray and ekg  STEMI: No STEMI on ekg  Dissection: equal pulses bilaterally and no ripping chest pain to the back  Esophageal rupture: no dysphagia or vomiting and chest xray negative for mediastinal air  Arrhythmia: no arrhythmia on ekg  CHF: no fluid overload on Cxr and physical exam  Pneumothorax: bilateral breath sounds and no signs of pneumothorax on chest xray   presents to ED c/o headache. I believe it is likely secondary to tension headache.  - SAH: not sudden onset or worst headache of life  - epidural/subdural hematoma: no head injury  - CVA: normal neuro exam  - temporal arteritis: no changes in vision, no temporal pain, headache not specifically unilateral  - glaucoma: age inconsistent, eye exam wnl  - meningitis: no neck stifness  - migraine: no history, no photophobia  - hypoglycemia/hyperglycemia: normal glucose    Patient given IV fluids and Benadryl and steroids and Reglan.  Patient felt better afterwards.    Patient felt improved with interventions and has a lower risk of impending cardiac failure to the heart score of 2. Patient was discharged with follow up with Dr greene. Return precautions given, patient understands and agrees with plan. All questions answered.  Instructed  to follow up with PCP.     Clinical Tests:   Lab Tests: Ordered and Reviewed  Radiological Study: Reviewed and Ordered  Medical Tests: Ordered and Reviewed            Scribe Attestation:   Scribe #1: I performed the above scribed service and the documentation accurately describes the services I performed. I attest to the accuracy of the note.               Clinical Impression:       ICD-10-CM ICD-9-CM   1. Chest wall pain R07.89 786.52   2. Chronic nonintractable headache, unspecified headache type R51 784.0   3. Transaminitis R74.0 790.4            I, Rad Aguayo, personally performed the services described in this documentation. All medical record entries made by the scribe were at my direction and in my presence.  I have reviewed the chart and agree that the record reflects my personal performance and is accurate and complete                       Rad Aguayo MD  10/07/19 0144

## 2020-05-26 ENCOUNTER — HOSPITAL ENCOUNTER (EMERGENCY)
Facility: HOSPITAL | Age: 40
Discharge: HOME | End: 2020-05-26
Attending: EMERGENCY MEDICINE
Payer: MEDICAID

## 2020-05-26 VITALS
HEIGHT: 62 IN | RESPIRATION RATE: 24 BRPM | HEART RATE: 120 BPM | OXYGEN SATURATION: 97 % | BODY MASS INDEX: 27.05 KG/M2 | TEMPERATURE: 98 F | DIASTOLIC BLOOD PRESSURE: 89 MMHG | SYSTOLIC BLOOD PRESSURE: 153 MMHG | WEIGHT: 147 LBS

## 2020-05-26 DIAGNOSIS — N39.0 URINARY TRACT INFECTION WITHOUT HEMATURIA, SITE UNSPECIFIED: ICD-10-CM

## 2020-05-26 DIAGNOSIS — R51.9 NONINTRACTABLE HEADACHE, UNSPECIFIED CHRONICITY PATTERN, UNSPECIFIED HEADACHE TYPE: Primary | ICD-10-CM

## 2020-05-26 DIAGNOSIS — E05.90 HYPERTHYROIDISM: ICD-10-CM

## 2020-05-26 DIAGNOSIS — R07.9 CHEST PAIN: ICD-10-CM

## 2020-05-26 LAB
ALBUMIN SERPL BCP-MCNC: 3.7 G/DL (ref 3.5–5.2)
ALP SERPL-CCNC: 223 U/L (ref 55–135)
ALT SERPL W/O P-5'-P-CCNC: 49 U/L (ref 10–44)
ANION GAP SERPL CALC-SCNC: 12 MMOL/L (ref 8–16)
AST SERPL-CCNC: 27 U/L (ref 10–40)
B-HCG UR QL: NEGATIVE
BACTERIA #/AREA URNS HPF: ABNORMAL /HPF
BASOPHILS # BLD AUTO: 0.05 K/UL (ref 0–0.2)
BASOPHILS NFR BLD: 0.4 % (ref 0–1.9)
BILIRUB SERPL-MCNC: 0.4 MG/DL (ref 0.1–1)
BILIRUB UR QL STRIP: NEGATIVE
BNP SERPL-MCNC: <10 PG/ML (ref 0–99)
BUN SERPL-MCNC: 12 MG/DL (ref 6–20)
CALCIUM SERPL-MCNC: 9.7 MG/DL (ref 8.7–10.5)
CHLORIDE SERPL-SCNC: 104 MMOL/L (ref 95–110)
CLARITY UR: ABNORMAL
CO2 SERPL-SCNC: 22 MMOL/L (ref 23–29)
COLOR UR: YELLOW
CREAT SERPL-MCNC: 0.6 MG/DL (ref 0.5–1.4)
CTP QC/QA: YES
D DIMER PPP IA.FEU-MCNC: 0.47 MG/L FEU
DIFFERENTIAL METHOD: ABNORMAL
EOSINOPHIL # BLD AUTO: 0.1 K/UL (ref 0–0.5)
EOSINOPHIL NFR BLD: 0.5 % (ref 0–8)
ERYTHROCYTE [DISTWIDTH] IN BLOOD BY AUTOMATED COUNT: 15.4 % (ref 11.5–14.5)
EST. GFR  (AFRICAN AMERICAN): >60 ML/MIN/1.73 M^2
EST. GFR  (NON AFRICAN AMERICAN): >60 ML/MIN/1.73 M^2
GLUCOSE SERPL-MCNC: 98 MG/DL (ref 70–110)
GLUCOSE UR QL STRIP: NEGATIVE
HCT VFR BLD AUTO: 40.5 % (ref 37–48.5)
HGB BLD-MCNC: 12.5 G/DL (ref 12–16)
HGB UR QL STRIP: NEGATIVE
HYALINE CASTS #/AREA URNS LPF: 8 /LPF
IMM GRANULOCYTES # BLD AUTO: 0.04 K/UL (ref 0–0.04)
IMM GRANULOCYTES NFR BLD AUTO: 0.3 % (ref 0–0.5)
KETONES UR QL STRIP: NEGATIVE
LEUKOCYTE ESTERASE UR QL STRIP: ABNORMAL
LYMPHOCYTES # BLD AUTO: 3.8 K/UL (ref 1–4.8)
LYMPHOCYTES NFR BLD: 29.2 % (ref 18–48)
MAGNESIUM SERPL-MCNC: 1.6 MG/DL (ref 1.6–2.6)
MCH RBC QN AUTO: 23 PG (ref 27–31)
MCHC RBC AUTO-ENTMCNC: 30.9 G/DL (ref 32–36)
MCV RBC AUTO: 75 FL (ref 82–98)
MICROSCOPIC COMMENT: ABNORMAL
MONOCYTES # BLD AUTO: 1.3 K/UL (ref 0.3–1)
MONOCYTES NFR BLD: 9.9 % (ref 4–15)
NEUTROPHILS # BLD AUTO: 7.9 K/UL (ref 1.8–7.7)
NEUTROPHILS NFR BLD: 59.7 % (ref 38–73)
NITRITE UR QL STRIP: NEGATIVE
NRBC BLD-RTO: 0 /100 WBC
PH UR STRIP: 5 [PH] (ref 5–8)
PLATELET # BLD AUTO: 246 K/UL (ref 150–350)
PMV BLD AUTO: 11.1 FL (ref 9.2–12.9)
POTASSIUM SERPL-SCNC: 3.6 MMOL/L (ref 3.5–5.1)
PROT SERPL-MCNC: 7.6 G/DL (ref 6–8.4)
PROT UR QL STRIP: ABNORMAL
RBC # BLD AUTO: 5.43 M/UL (ref 4–5.4)
RBC #/AREA URNS HPF: 4 /HPF (ref 0–4)
SARS-COV-2 RDRP RESP QL NAA+PROBE: NEGATIVE
SODIUM SERPL-SCNC: 138 MMOL/L (ref 136–145)
SP GR UR STRIP: 1.03 (ref 1–1.03)
SQUAMOUS #/AREA URNS HPF: 8 /HPF
T4 FREE SERPL-MCNC: 2.56 NG/DL (ref 0.71–1.51)
TROPONIN I SERPL DL<=0.01 NG/ML-MCNC: 0.01 NG/ML (ref 0–0.03)
TSH SERPL DL<=0.005 MIU/L-ACNC: <0.01 UIU/ML (ref 0.4–4)
URN SPEC COLLECT METH UR: ABNORMAL
UROBILINOGEN UR STRIP-ACNC: NEGATIVE EU/DL
WBC # BLD AUTO: 13.17 K/UL (ref 3.9–12.7)
WBC #/AREA URNS HPF: 20 /HPF (ref 0–5)

## 2020-05-26 PROCEDURE — 83880 ASSAY OF NATRIURETIC PEPTIDE: CPT

## 2020-05-26 PROCEDURE — 25000003 PHARM REV CODE 250: Performed by: EMERGENCY MEDICINE

## 2020-05-26 PROCEDURE — U0002 COVID-19 LAB TEST NON-CDC: HCPCS

## 2020-05-26 PROCEDURE — 96375 TX/PRO/DX INJ NEW DRUG ADDON: CPT

## 2020-05-26 PROCEDURE — 99285 EMERGENCY DEPT VISIT HI MDM: CPT | Mod: 25

## 2020-05-26 PROCEDURE — 85379 FIBRIN DEGRADATION QUANT: CPT

## 2020-05-26 PROCEDURE — 84443 ASSAY THYROID STIM HORMONE: CPT

## 2020-05-26 PROCEDURE — 80053 COMPREHEN METABOLIC PANEL: CPT

## 2020-05-26 PROCEDURE — 83735 ASSAY OF MAGNESIUM: CPT

## 2020-05-26 PROCEDURE — 96365 THER/PROPH/DIAG IV INF INIT: CPT

## 2020-05-26 PROCEDURE — 85025 COMPLETE CBC W/AUTO DIFF WBC: CPT

## 2020-05-26 PROCEDURE — 87086 URINE CULTURE/COLONY COUNT: CPT

## 2020-05-26 PROCEDURE — 84484 ASSAY OF TROPONIN QUANT: CPT

## 2020-05-26 PROCEDURE — 93005 ELECTROCARDIOGRAM TRACING: CPT

## 2020-05-26 PROCEDURE — 84445 ASSAY OF TSI GLOBULIN: CPT

## 2020-05-26 PROCEDURE — 81000 URINALYSIS NONAUTO W/SCOPE: CPT | Mod: 59

## 2020-05-26 PROCEDURE — 81025 URINE PREGNANCY TEST: CPT | Performed by: EMERGENCY MEDICINE

## 2020-05-26 PROCEDURE — 80307 DRUG TEST PRSMV CHEM ANLYZR: CPT

## 2020-05-26 PROCEDURE — 84439 ASSAY OF FREE THYROXINE: CPT

## 2020-05-26 PROCEDURE — 63600175 PHARM REV CODE 636 W HCPCS: Performed by: EMERGENCY MEDICINE

## 2020-05-26 RX ORDER — PROCHLORPERAZINE EDISYLATE 5 MG/ML
5 INJECTION INTRAMUSCULAR; INTRAVENOUS
Status: DISCONTINUED | OUTPATIENT
Start: 2020-05-26 | End: 2020-05-26 | Stop reason: HOSPADM

## 2020-05-26 RX ORDER — MORPHINE SULFATE 10 MG/ML
4 INJECTION INTRAMUSCULAR; INTRAVENOUS; SUBCUTANEOUS
Status: COMPLETED | OUTPATIENT
Start: 2020-05-26 | End: 2020-05-26

## 2020-05-26 RX ORDER — IBUPROFEN 600 MG/1
600 TABLET ORAL EVERY 6 HOURS PRN
Qty: 20 TABLET | Refills: 0 | Status: ON HOLD | OUTPATIENT
Start: 2020-05-26 | End: 2023-03-08 | Stop reason: HOSPADM

## 2020-05-26 RX ORDER — CEPHALEXIN 500 MG/1
500 CAPSULE ORAL EVERY 12 HOURS
Qty: 20 CAPSULE | Refills: 0 | Status: SHIPPED | OUTPATIENT
Start: 2020-05-26 | End: 2020-05-26 | Stop reason: SDUPTHER

## 2020-05-26 RX ORDER — METOPROLOL SUCCINATE 25 MG/1
25 TABLET, EXTENDED RELEASE ORAL DAILY
Qty: 30 TABLET | Refills: 0 | OUTPATIENT
Start: 2020-05-26 | End: 2021-08-13

## 2020-05-26 RX ORDER — ACETAMINOPHEN 500 MG
1000 TABLET ORAL EVERY 8 HOURS PRN
Qty: 30 TABLET | Refills: 0 | Status: ON HOLD | OUTPATIENT
Start: 2020-05-26 | End: 2023-03-08 | Stop reason: HOSPADM

## 2020-05-26 RX ORDER — DIPHENHYDRAMINE HYDROCHLORIDE 50 MG/ML
25 INJECTION INTRAMUSCULAR; INTRAVENOUS
Status: DISCONTINUED | OUTPATIENT
Start: 2020-05-26 | End: 2020-05-26 | Stop reason: HOSPADM

## 2020-05-26 RX ORDER — CEPHALEXIN 500 MG/1
500 CAPSULE ORAL EVERY 12 HOURS
Qty: 20 CAPSULE | Refills: 0 | Status: SHIPPED | OUTPATIENT
Start: 2020-05-26 | End: 2020-06-05

## 2020-05-26 RX ADMIN — MORPHINE SULFATE 4 MG: 10 INJECTION INTRAVENOUS at 09:05

## 2020-05-26 RX ADMIN — CEFTRIAXONE 1 G: 1 INJECTION, SOLUTION INTRAVENOUS at 11:05

## 2020-05-26 RX ADMIN — PROPRANOLOL HYDROCHLORIDE 60 MG: 10 TABLET ORAL at 11:05

## 2020-05-26 NOTE — DISCHARGE INSTRUCTIONS
Emergency department testing shows that you have hyperthyroidism.  It is important that you start the prescribed metoprolol to control your heart rate and your blood pressure.  It is very important that you establish care with an endocrinologist for further workup and definitive treatment of your thyroid problems.  You should also establish care with a primary care physician for ongoing monitoring of your blood pressure.  You should return to the emergency department for any new, progressive or significantly concerning symptoms.    Thank you for coming to our Emergency Department today. It is important to remember that some problems are difficult to diagnose and may not be found during your first visit. Be sure to follow up with your primary care doctor and review any labs/imaging that was performed with them. If you do not have a primary care doctor, you may contact the one listed on your discharge paperwork or you may also call the [facility] Appointment Desk at [phone] to schedule an appointment with one.     All medications may potentially have side effects and it is impossible to predict which medications may give you side effects. If you feel that you are having a negative effect of any medication you should immediately stop taking them and seek medical attention.    Return to the ER with any questions/concerns, new/concerning symptoms, worsening or failure to improve. Do not drive or make any important decisions for 24 hours if you have received any pain medications, sedatives or mood altering drugs during your ER visit.

## 2020-05-26 NOTE — ED PROVIDER NOTES
"Encounter Date: 5/26/2020       History     Chief Complaint   Patient presents with    Headache     The patient reports right posterior headache that radiates down neck, and left flank pain x 1 week. The patient also reports intermittent sharp pains to bilateral hands, and patient states, "my hands get stuck".    Flank Pain    Hand Pain     40-year-old female with history of hypertension, migraine presents for evaluation gradual onset right sided headache radiating to right neck and right shoulder that has been worsening over the past week.  Patient denies vision changes, nausea, vomiting, diaphoresis or fever.  Patient reports associated and left flank pain.  Attempted treatment with ibuprofen and Tylenol without improvement.  No exacerbating or alleviating factors identified.        Review of patient's allergies indicates:  No Known Allergies  Past Medical History:   Diagnosis Date    Migraines      History reviewed. No pertinent surgical history.  History reviewed. No pertinent family history.  Social History     Tobacco Use    Smoking status: Current Every Day Smoker     Packs/day: 1.00     Types: Cigarettes    Smokeless tobacco: Never Used    Tobacco comment: 1 pack of cigarettes will last the pt 2 days.   Substance Use Topics    Alcohol use: No     Frequency: Never    Drug use: Yes     Types: Marijuana     Review of Systems   Constitutional: Negative for diaphoresis and fever.   HENT: Negative for sore throat.    Eyes: Negative for visual disturbance.   Respiratory: Positive for shortness of breath (Chronic).    Cardiovascular: Negative for chest pain and palpitations.   Gastrointestinal: Negative for abdominal pain, nausea and vomiting.   Genitourinary: Positive for dysuria. Negative for frequency.   Musculoskeletal: Positive for back pain and myalgias.   Skin: Negative for rash.   Neurological: Positive for headaches. Negative for weakness and numbness.   Hematological: Does not bruise/bleed easily. "       Physical Exam     Initial Vitals   BP Pulse Resp Temp SpO2   05/26/20 0853 05/26/20 0853 05/26/20 0853 05/26/20 0856 05/26/20 0853   (!) 177/108 (!) 125 18 99.1 °F (37.3 °C) 96 %      MAP       --                Physical Exam    Nursing note and vitals reviewed.  Constitutional: She appears distressed.   HENT:   Head: Normocephalic and atraumatic.   Mouth/Throat: Oropharynx is clear and moist.   Eyes: EOM are normal. Pupils are equal, round, and reactive to light. No scleral icterus.   No proptosis   Neck: Normal range of motion. Neck supple. No JVD present.   + thyromegaly without tenderness  Right paraspinal cervical and periscapular tenderness  No midline vertebral tenderness   Cardiovascular: Regular rhythm and intact distal pulses.   Tachycardic   Pulmonary/Chest: No stridor. No respiratory distress. She has rales (Mild at bilateral bases).   Abdominal: Soft. Bowel sounds are normal. She exhibits no distension. There is no tenderness. There is no rebound and no guarding.   Musculoskeletal: Normal range of motion. She exhibits edema ( mild symmetrical lower extremity). She exhibits no tenderness.   Neurological: She is alert. She has normal strength. No cranial nerve deficit or sensory deficit. GCS score is 15. GCS eye subscore is 4. GCS verbal subscore is 5. GCS motor subscore is 6.   Skin: Skin is warm.   Psychiatric:   Anxious appearing         ED Course   Procedures  Labs Reviewed   CBC W/ AUTO DIFFERENTIAL - Abnormal; Notable for the following components:       Result Value    WBC 13.17 (*)     RBC 5.43 (*)     Mean Corpuscular Volume 75 (*)     Mean Corpuscular Hemoglobin 23.0 (*)     Mean Corpuscular Hemoglobin Conc 30.9 (*)     RDW 15.4 (*)     Gran # (ANC) 7.9 (*)     Mono # 1.3 (*)     All other components within normal limits   COMPREHENSIVE METABOLIC PANEL - Abnormal; Notable for the following components:    CO2 22 (*)     Alkaline Phosphatase 223 (*)     ALT 49 (*)     All other components  within normal limits   URINALYSIS, REFLEX TO URINE CULTURE - Abnormal; Notable for the following components:    Appearance, UA Hazy (*)     Protein, UA 1+ (*)     Leukocytes, UA 3+ (*)     All other components within normal limits    Narrative:     Preferred Collection Type->Urine, Clean Catch   TSH - Abnormal; Notable for the following components:    TSH <0.010 (*)     All other components within normal limits   URINALYSIS MICROSCOPIC - Abnormal; Notable for the following components:    WBC, UA 20 (*)     Hyaline Casts, UA 8 (*)     All other components within normal limits    Narrative:     Preferred Collection Type->Urine, Clean Catch   T4, FREE - Abnormal; Notable for the following components:    Free T4 2.56 (*)     All other components within normal limits   CULTURE, URINE   TROPONIN I   B-TYPE NATRIURETIC PEPTIDE   D DIMER, QUANTITATIVE   MAGNESIUM   SARS-COV-2 RNA AMPLIFICATION, QUAL   DRUG SCREEN PANEL, URINE EMERGENCY   THYROID STIMULATING IMMUNOGLOBULIN   POCT URINE PREGNANCY     EKG Readings: (Independently Interpreted)   Initial Reading: No STEMI. Rhythm: Sinus Tachycardia. Heart Rate: 123. Ectopy: No Ectopy. Conduction: Normal. ST Segments: Normal ST Segments. T Waves: Normal. Axis: Normal.     ECG Results          EKG 12-lead (In process)  Result time 05/26/20 10:18:30    In process by Interface, Lab In [facility] (05/26/20 10:18:30)                 Narrative:    Test Reason : R07.9,    Vent. Rate : 123 BPM     Atrial Rate : 123 BPM     P-R Int : 154 ms          QRS Dur : 078 ms      QT Int : 308 ms       P-R-T Axes : 080 082 062 degrees     QTc Int : 440 ms    Sinus tachycardia  Biatrial enlargement  LVH  Abnormal ECG  When compared with ECG of 06-OCT-2019 23:35,  Significant changes have occurred    Referred By: [email or web address]   SELF           Confirmed By:                   In process by Interface, Lab In [facility] (05/26/20 10:17:00)                 Narrative:    Test Reason : R07.9,    Vent. Rate : 123 BPM      Atrial Rate : 123 BPM     P-R Int : 154 ms          QRS Dur : 078 ms      QT Int : 308 ms       P-R-T Axes : 080 082 062 degrees     QTc Int : 440 ms    Sinus tachycardia  Biatrial enlargement  LVH  Abnormal ECG  When compared with ECG of 06-OCT-2019 23:35,  Significant changes have occurred    Referred By: [email or web address]   SELF           Confirmed By:                             Imaging Results          CT Head Without Contrast (Final result)  Result time 05/26/20 10:17:55    Final result by Mathew Huertas MD (05/26/20 10:17:55)                 Impression:      No acute intracranial abnormality.    Mild paranasal sinus disease.      Electronically signed by: Mathew Huertas MD  Date:    05/26/2020  Time:    10:17             Narrative:    EXAMINATION:  CT HEAD WITHOUT CONTRAST    CLINICAL HISTORY:  Headache, acute, severe, thunderclap, worst HA of life;    TECHNIQUE:  Low dose axial CT images obtained throughout the head without intravenous contrast. Sagittal and coronal reconstructions were performed.    COMPARISON:  None.    FINDINGS:  Intracranial compartment:    Ventricles and sulci are normal in size for age without evidence of hydrocephalus. No extra-axial blood or fluid collections.    The brain parenchyma appears normal. No parenchymal mass, hemorrhage, edema or major vascular distribution infarct.    Skull/extracranial contents (limited evaluation): No fracture.  Mild patchy mucosal membrane thickening in the ethmoid sinuses.  Otherwise, mastoid air cells and paranasal sinuses are essentially clear.                               X-Ray Chest AP Portable (Final result)  Result time 05/26/20 09:50:55    Final result by Facundo Kasper MD (05/26/20 09:50:55)                 Impression:      Patchy consolidative change LEFT lung base may be that of atelectasis versus pneumonia.      Electronically signed by: Facundo Kasper MD  Date:    05/26/2020  Time:    09:50             Narrative:    EXAMINATION:  XR  CHEST AP PORTABLE    CLINICAL HISTORY:  Chest Pain;    TECHNIQUE:  Single frontal view of the chest was performed.    COMPARISON:  10/07/2019    FINDINGS:  Patchy consolidative changes, early identified LEFT lung base may represent that of early pneumonia.  Correlation advised.  Atelectasis could have a similar appearance.  No pleural effusion. No evident pneumothorax.    The cardiac silhouette is normal in size. The hilar and mediastinal contours are unremarkable.    Bones are intact.                                 Medical Decision Making:   History:   Old Medical Records: I decided to obtain old medical records.  Old Records Summarized: other records.       <> Summary of Records: Past ED visit for headache.  Initial Assessment:   Patient is hypertensive and tachycardic and uncomfortable appearing at time history and physical.  She has no focal neurological deficits.  Will give analgesia and obtain lab testing and imaging to further evaluate.  Differential Diagnosis:   Includes but not limited to:  Symptomatic hypertension, migraine, CVA, electrolyte abnormality, cervicogenic headache, ACS, thyroid dysfunction  Independently Interpreted Test(s):   I have ordered and independently interpreted EKG Reading(s) - see prior notes  Clinical Tests:   Lab Tests: Ordered and Reviewed  Radiological Study: Ordered and Reviewed  Medical Tests: Ordered and Reviewed                   ED Course as of May 26 1358   Tue May 26, 2020   1046 Labs with mild leukocytosis without granulocytosis.  UA suggestive of UTI.  Patient reports dysuria.  She is to be started on antibiotics in the emergency department.  Renal function is within normal ranges.  Patient given morphine with improvement in blood pressure.  She remains tachycardic.  On reassessment she continues to complain of headache.  She remains without focal neurological deficits.  CT scan of the head does not demonstrate intracranial abnormalities.  Will give further analgesia.   Anticipate discharging patient if she has improvement in symptoms after further analgesia    [SG]      ED Course User Index  [SG] Jennifer Gonzalez MD     D-dimer is negative.  I have low clinical suspicion of ACS in this patient.  Patient has elevated free T4.  She has been given propanolol in the emergency department.  She clinically does not appear to be in thyroid storm.    Consult: I have spoken with Dr. Thakkar from the endocrinology service regarding the patient's presentation and study results.  At this time, the recommendation is that patient does not require inpatient workup of her thyroid does function.  Recommend initiation of beta-blocker such as metoprolol XL as well as setting of TSI to facilitate outpatient workup.    On reassessment patient reports feeling well.  She requests discharge stating that her headache is resolved.  She has been informed of thyroid findings and the importance of outpatient follow-up.  She has been prescribed metoprolol XL as well as a course of Keflex for UTI.  Also referred to primary care physician.    counseled on supportive care, appropriate medication usage, concerning symptoms for which to return to ER and the importance of follow up. Understanding and agreement with treatment plan was expressed.                Clinical Impression:       ICD-10-CM ICD-9-CM   1. Nonintractable headache, unspecified chronicity pattern, unspecified headache type R51 784.0   2. Chest pain R07.9 786.50   3. Hyperthyroidism E05.90 242.90   4. Urinary tract infection without hematuria, site unspecified N39.0 599.0         Disposition:   Disposition: Discharged  Condition: Fair     ED Disposition Condition    Discharge Stable        ED Prescriptions     Medication Sig Dispense Start Date End Date Auth. Provider    metoprolol succinate (TOPROL-XL) 25 MG 24 hr tablet Take 1 tablet (25 mg total) by mouth once daily. 30 tablet 5/26/2020  Jennifer Gonzalez MD    acetaminophen (TYLENOL EXTRA STRENGTH)  500 MG tablet Take 2 tablets (1,000 mg total) by mouth every 8 (eight) hours as needed for Pain. 30 tablet 5/26/2020  Jennifer Gonzalez MD    ibuprofen (ADVIL,MOTRIN) 600 MG tablet Take 1 tablet (600 mg total) by mouth every 6 (six) hours as needed for Pain. 20 tablet 5/26/2020  Jennifer Gonzalez MD    cephALEXin (KEFLEX) 500 MG capsule  (Status: Discontinued) Take 1 capsule (500 mg total) by mouth every 12 (twelve) hours. for 10 days 20 capsule 5/26/2020 5/26/2020 Jennifer Gonzalez MD    cephALEXin (KEFLEX) 500 MG capsule Take 1 capsule (500 mg total) by mouth every 12 (twelve) hours. for 10 days 20 capsule 5/26/2020 6/5/2020 Jennifer Gonzalez MD        Follow-up Information     Follow up With Specialties Details Why Contact Info    [facility] ENDOCRINOLOGY Endocrinology Schedule an appointment as soon as possible for a visit   [address] [address] [phone]    [facility]  Schedule an appointment as soon as possible for a visit   [address] [facility] [address]                                     Jennifer Gonzalez MD  05/26/20 3579

## 2020-05-26 NOTE — ED NOTES
Discharge instructions reviewed with pt, verbalized understanding. Pt refused last set of vitals prior to departure from dept. Pt temp WDL, RR WDL. Pt ambulatory and stable with steady gait.

## 2020-05-26 NOTE — ED TRIAGE NOTES
"Pt arrived to ED with c/o HA x 1 week that radiates to neck. Pt also reports L flank pain x 1 week, reports frequency. Pt states "my hands keep cramping and are numb and tingling." NAD.   "

## 2020-05-28 LAB — BACTERIA UR CULT: NO GROWTH

## 2020-06-01 LAB — TSI SER-ACNC: 5.56 IU/L

## 2021-08-09 ENCOUNTER — LAB VISIT (OUTPATIENT)
Dept: LAB | Facility: OTHER | Age: 41
End: 2021-08-09
Payer: MEDICAID

## 2021-08-09 DIAGNOSIS — Z20.822 ENCOUNTER FOR LABORATORY TESTING FOR COVID-19 VIRUS: ICD-10-CM

## 2021-08-09 PROCEDURE — U0003 INFECTIOUS AGENT DETECTION BY NUCLEIC ACID (DNA OR RNA); SEVERE ACUTE RESPIRATORY SYNDROME CORONAVIRUS 2 (SARS-COV-2) (CORONAVIRUS DISEASE [COVID-19]), AMPLIFIED PROBE TECHNIQUE, MAKING USE OF HIGH THROUGHPUT TECHNOLOGIES AS DESCRIBED BY CMS-2020-01-R: HCPCS | Performed by: NURSE PRACTITIONER

## 2021-08-12 ENCOUNTER — HOSPITAL ENCOUNTER (EMERGENCY)
Facility: HOSPITAL | Age: 41
Discharge: HOME | End: 2021-08-13
Attending: EMERGENCY MEDICINE
Payer: MEDICAID

## 2021-08-12 DIAGNOSIS — E05.90 PRIMARY HYPERTHYROIDISM: ICD-10-CM

## 2021-08-12 DIAGNOSIS — R06.02 SOB (SHORTNESS OF BREATH): ICD-10-CM

## 2021-08-12 DIAGNOSIS — R07.9 CHEST PAIN, UNSPECIFIED TYPE: Primary | ICD-10-CM

## 2021-08-12 LAB
ALBUMIN SERPL BCP-MCNC: 3.3 G/DL (ref 3.5–5.2)
ALP SERPL-CCNC: 261 U/L (ref 55–135)
ALT SERPL W/O P-5'-P-CCNC: 28 U/L (ref 10–44)
ANION GAP SERPL CALC-SCNC: 10 MMOL/L (ref 8–16)
AST SERPL-CCNC: 24 U/L (ref 10–40)
BASOPHILS # BLD AUTO: 0.04 K/UL (ref 0–0.2)
BASOPHILS NFR BLD: 0.6 % (ref 0–1.9)
BILIRUB SERPL-MCNC: 0.6 MG/DL (ref 0.1–1)
BNP SERPL-MCNC: 11 PG/ML (ref 0–99)
BUN SERPL-MCNC: 6 MG/DL (ref 6–20)
CALCIUM SERPL-MCNC: 9.1 MG/DL (ref 8.7–10.5)
CHLORIDE SERPL-SCNC: 103 MMOL/L (ref 95–110)
CO2 SERPL-SCNC: 27 MMOL/L (ref 23–29)
CREAT SERPL-MCNC: 0.5 MG/DL (ref 0.5–1.4)
CTP QC/QA: YES
DIFFERENTIAL METHOD: ABNORMAL
EOSINOPHIL # BLD AUTO: 0.2 K/UL (ref 0–0.5)
EOSINOPHIL NFR BLD: 2.7 % (ref 0–8)
ERYTHROCYTE [DISTWIDTH] IN BLOOD BY AUTOMATED COUNT: 15.1 % (ref 11.5–14.5)
EST. GFR  (AFRICAN AMERICAN): >60 ML/MIN/1.73 M^2
EST. GFR  (NON AFRICAN AMERICAN): >60 ML/MIN/1.73 M^2
GLUCOSE SERPL-MCNC: 107 MG/DL (ref 70–110)
HCT VFR BLD AUTO: 38.2 % (ref 37–48.5)
HGB BLD-MCNC: 12.1 G/DL (ref 12–16)
IMM GRANULOCYTES # BLD AUTO: 0.01 K/UL (ref 0–0.04)
IMM GRANULOCYTES NFR BLD AUTO: 0.1 % (ref 0–0.5)
LYMPHOCYTES # BLD AUTO: 3.4 K/UL (ref 1–4.8)
LYMPHOCYTES NFR BLD: 47.7 % (ref 18–48)
MCH RBC QN AUTO: 23.7 PG (ref 27–31)
MCHC RBC AUTO-ENTMCNC: 31.7 G/DL (ref 32–36)
MCV RBC AUTO: 75 FL (ref 82–98)
MONOCYTES # BLD AUTO: 0.9 K/UL (ref 0.3–1)
MONOCYTES NFR BLD: 12.5 % (ref 4–15)
NEUTROPHILS # BLD AUTO: 2.6 K/UL (ref 1.8–7.7)
NEUTROPHILS NFR BLD: 36.4 % (ref 38–73)
NRBC BLD-RTO: 0 /100 WBC
PLATELET # BLD AUTO: 235 K/UL (ref 150–450)
PMV BLD AUTO: 10.5 FL (ref 9.2–12.9)
POTASSIUM SERPL-SCNC: 4 MMOL/L (ref 3.5–5.1)
PROT SERPL-MCNC: 7 G/DL (ref 6–8.4)
RBC # BLD AUTO: 5.1 M/UL (ref 4–5.4)
SARS-COV-2 RDRP RESP QL NAA+PROBE: NEGATIVE
SARS-COV-2 RNA RESP QL NAA+PROBE: NORMAL
SODIUM SERPL-SCNC: 140 MMOL/L (ref 136–145)
TEST PERFORMANCE INFO SPEC: NORMAL
TROPONIN I SERPL DL<=0.01 NG/ML-MCNC: <0.006 NG/ML (ref 0–0.03)
WBC # BLD AUTO: 7.1 K/UL (ref 3.9–12.7)

## 2021-08-12 PROCEDURE — 93005 ELECTROCARDIOGRAM TRACING: CPT

## 2021-08-12 PROCEDURE — 99285 EMERGENCY DEPT VISIT HI MDM: CPT | Mod: 25

## 2021-08-12 PROCEDURE — 80053 COMPREHEN METABOLIC PANEL: CPT | Performed by: NURSE PRACTITIONER

## 2021-08-12 PROCEDURE — 84439 ASSAY OF FREE THYROXINE: CPT | Performed by: NURSE PRACTITIONER

## 2021-08-12 PROCEDURE — 84484 ASSAY OF TROPONIN QUANT: CPT | Performed by: NURSE PRACTITIONER

## 2021-08-12 PROCEDURE — 93010 ELECTROCARDIOGRAM REPORT: CPT | Mod: ,,, | Performed by: INTERNAL MEDICINE

## 2021-08-12 PROCEDURE — 93010 EKG 12-LEAD: ICD-10-PCS | Mod: ,,, | Performed by: INTERNAL MEDICINE

## 2021-08-12 PROCEDURE — 84443 ASSAY THYROID STIM HORMONE: CPT | Performed by: NURSE PRACTITIONER

## 2021-08-12 PROCEDURE — 85025 COMPLETE CBC W/AUTO DIFF WBC: CPT | Performed by: NURSE PRACTITIONER

## 2021-08-12 PROCEDURE — 83880 ASSAY OF NATRIURETIC PEPTIDE: CPT | Performed by: NURSE PRACTITIONER

## 2021-08-12 PROCEDURE — U0002 COVID-19 LAB TEST NON-CDC: HCPCS | Performed by: EMERGENCY MEDICINE

## 2021-08-13 VITALS
RESPIRATION RATE: 18 BRPM | HEART RATE: 104 BPM | BODY MASS INDEX: 22.08 KG/M2 | WEIGHT: 120 LBS | TEMPERATURE: 98 F | DIASTOLIC BLOOD PRESSURE: 73 MMHG | SYSTOLIC BLOOD PRESSURE: 134 MMHG | HEIGHT: 62 IN | OXYGEN SATURATION: 97 %

## 2021-08-13 LAB
D DIMER PPP IA.FEU-MCNC: 0.6 MG/L FEU
T4 FREE SERPL-MCNC: 2.59 NG/DL (ref 0.71–1.51)
TSH SERPL DL<=0.005 MIU/L-ACNC: <0.01 UIU/ML (ref 0.4–4)

## 2021-08-13 PROCEDURE — 25500020 PHARM REV CODE 255: Performed by: EMERGENCY MEDICINE

## 2021-08-13 PROCEDURE — 25000003 PHARM REV CODE 250: Performed by: EMERGENCY MEDICINE

## 2021-08-13 PROCEDURE — 85379 FIBRIN DEGRADATION QUANT: CPT | Performed by: NURSE PRACTITIONER

## 2021-08-13 RX ORDER — ATENOLOL 25 MG/1
25 TABLET ORAL DAILY
Qty: 30 TABLET | Refills: 11 | Status: SHIPPED | OUTPATIENT
Start: 2021-08-13 | End: 2022-05-10 | Stop reason: SDUPTHER

## 2021-08-13 RX ORDER — ATENOLOL 25 MG/1
25 TABLET ORAL
Status: COMPLETED | OUTPATIENT
Start: 2021-08-13 | End: 2021-08-13

## 2021-08-13 RX ADMIN — IOHEXOL 60 ML: 350 INJECTION, SOLUTION INTRAVENOUS at 01:08

## 2021-08-13 RX ADMIN — ATENOLOL 25 MG: 25 TABLET ORAL at 03:08

## 2021-08-16 ENCOUNTER — LAB VISIT (OUTPATIENT)
Dept: LAB | Facility: OTHER | Age: 41
End: 2021-08-16
Payer: MEDICAID

## 2021-08-16 DIAGNOSIS — Z20.822 ENCOUNTER FOR LABORATORY TESTING FOR COVID-19 VIRUS: ICD-10-CM

## 2021-08-16 PROCEDURE — U0003 INFECTIOUS AGENT DETECTION BY NUCLEIC ACID (DNA OR RNA); SEVERE ACUTE RESPIRATORY SYNDROME CORONAVIRUS 2 (SARS-COV-2) (CORONAVIRUS DISEASE [COVID-19]), AMPLIFIED PROBE TECHNIQUE, MAKING USE OF HIGH THROUGHPUT TECHNOLOGIES AS DESCRIBED BY CMS-2020-01-R: HCPCS | Performed by: NURSE PRACTITIONER

## 2021-08-17 LAB
SARS-COV-2 RNA RESP QL NAA+PROBE: NOT DETECTED
SARS-COV-2- CYCLE NUMBER: -1

## 2021-09-28 ENCOUNTER — HOSPITAL ENCOUNTER (EMERGENCY)
Facility: HOSPITAL | Age: 41
Discharge: LEFT BEFORE DISCHARGE | End: 2021-09-28
Attending: EMERGENCY MEDICINE
Payer: MEDICAID

## 2021-09-28 VITALS
BODY MASS INDEX: 23.92 KG/M2 | WEIGHT: 130 LBS | HEIGHT: 62 IN | DIASTOLIC BLOOD PRESSURE: 82 MMHG | TEMPERATURE: 98 F | HEART RATE: 111 BPM | RESPIRATION RATE: 20 BRPM | OXYGEN SATURATION: 100 % | SYSTOLIC BLOOD PRESSURE: 142 MMHG

## 2021-09-28 DIAGNOSIS — R22.1 NECK SWELLING: Primary | ICD-10-CM

## 2021-09-28 LAB
ALBUMIN SERPL BCP-MCNC: 3.8 G/DL (ref 3.5–5.2)
ALP SERPL-CCNC: 264 U/L (ref 55–135)
ALT SERPL W/O P-5'-P-CCNC: 103 U/L (ref 10–44)
ANION GAP SERPL CALC-SCNC: 11 MMOL/L (ref 8–16)
AST SERPL-CCNC: 60 U/L (ref 10–40)
B-HCG UR QL: NEGATIVE
BACTERIA #/AREA URNS HPF: ABNORMAL /HPF
BASOPHILS # BLD AUTO: 0.03 K/UL (ref 0–0.2)
BASOPHILS NFR BLD: 0.5 % (ref 0–1.9)
BILIRUB SERPL-MCNC: 1 MG/DL (ref 0.1–1)
BILIRUB UR QL STRIP: NEGATIVE
BUN SERPL-MCNC: 11 MG/DL (ref 6–20)
CALCIUM SERPL-MCNC: 11 MG/DL (ref 8.7–10.5)
CHLORIDE SERPL-SCNC: 104 MMOL/L (ref 95–110)
CLARITY UR: CLEAR
CO2 SERPL-SCNC: 23 MMOL/L (ref 23–29)
COLOR UR: YELLOW
CREAT SERPL-MCNC: 0.6 MG/DL (ref 0.5–1.4)
CTP QC/QA: YES
CTP QC/QA: YES
DIFFERENTIAL METHOD: ABNORMAL
EOSINOPHIL # BLD AUTO: 0 K/UL (ref 0–0.5)
EOSINOPHIL NFR BLD: 0.2 % (ref 0–8)
ERYTHROCYTE [DISTWIDTH] IN BLOOD BY AUTOMATED COUNT: 14.6 % (ref 11.5–14.5)
EST. GFR  (AFRICAN AMERICAN): >60 ML/MIN/1.73 M^2
EST. GFR  (NON AFRICAN AMERICAN): >60 ML/MIN/1.73 M^2
GLUCOSE SERPL-MCNC: 110 MG/DL (ref 70–110)
GLUCOSE UR QL STRIP: NEGATIVE
HCT VFR BLD AUTO: 43.9 % (ref 37–48.5)
HGB BLD-MCNC: 14.2 G/DL (ref 12–16)
HGB UR QL STRIP: ABNORMAL
HYALINE CASTS #/AREA URNS LPF: 1 /LPF
IMM GRANULOCYTES # BLD AUTO: 0.02 K/UL (ref 0–0.04)
IMM GRANULOCYTES NFR BLD AUTO: 0.4 % (ref 0–0.5)
KETONES UR QL STRIP: NEGATIVE
LEUKOCYTE ESTERASE UR QL STRIP: ABNORMAL
LYMPHOCYTES # BLD AUTO: 1.7 K/UL (ref 1–4.8)
LYMPHOCYTES NFR BLD: 31.3 % (ref 18–48)
MCH RBC QN AUTO: 23.7 PG (ref 27–31)
MCHC RBC AUTO-ENTMCNC: 32.3 G/DL (ref 32–36)
MCV RBC AUTO: 73 FL (ref 82–98)
MICROSCOPIC COMMENT: ABNORMAL
MONOCYTES # BLD AUTO: 0.6 K/UL (ref 0.3–1)
MONOCYTES NFR BLD: 11.1 % (ref 4–15)
NEUTROPHILS # BLD AUTO: 3.1 K/UL (ref 1.8–7.7)
NEUTROPHILS NFR BLD: 56.5 % (ref 38–73)
NITRITE UR QL STRIP: NEGATIVE
NRBC BLD-RTO: 0 /100 WBC
PH UR STRIP: 7 [PH] (ref 5–8)
PLATELET # BLD AUTO: 262 K/UL (ref 150–450)
PMV BLD AUTO: 10.6 FL (ref 9.2–12.9)
POTASSIUM SERPL-SCNC: 4.6 MMOL/L (ref 3.5–5.1)
PROT SERPL-MCNC: 8.3 G/DL (ref 6–8.4)
PROT UR QL STRIP: ABNORMAL
RBC # BLD AUTO: 6 M/UL (ref 4–5.4)
RBC #/AREA URNS HPF: 4 /HPF (ref 0–4)
SARS-COV-2 RDRP RESP QL NAA+PROBE: NEGATIVE
SODIUM SERPL-SCNC: 138 MMOL/L (ref 136–145)
SP GR UR STRIP: 1.01 (ref 1–1.03)
SQUAMOUS #/AREA URNS HPF: 2 /HPF
T4 FREE SERPL-MCNC: >5 NG/DL (ref 0.71–1.51)
TSH SERPL DL<=0.005 MIU/L-ACNC: <0.01 UIU/ML (ref 0.4–4)
URN SPEC COLLECT METH UR: ABNORMAL
UROBILINOGEN UR STRIP-ACNC: NEGATIVE EU/DL
WBC # BLD AUTO: 5.49 K/UL (ref 3.9–12.7)
WBC #/AREA URNS HPF: 9 /HPF (ref 0–5)
WBC CLUMPS URNS QL MICRO: ABNORMAL

## 2021-09-28 PROCEDURE — 80053 COMPREHEN METABOLIC PANEL: CPT | Performed by: PHYSICIAN ASSISTANT

## 2021-09-28 PROCEDURE — 84439 ASSAY OF FREE THYROXINE: CPT | Performed by: PHYSICIAN ASSISTANT

## 2021-09-28 PROCEDURE — 99284 EMERGENCY DEPT VISIT MOD MDM: CPT | Mod: 25

## 2021-09-28 PROCEDURE — U0002 COVID-19 LAB TEST NON-CDC: HCPCS | Performed by: PHYSICIAN ASSISTANT

## 2021-09-28 PROCEDURE — 96374 THER/PROPH/DIAG INJ IV PUSH: CPT

## 2021-09-28 PROCEDURE — 81025 URINE PREGNANCY TEST: CPT | Performed by: PHYSICIAN ASSISTANT

## 2021-09-28 PROCEDURE — 96361 HYDRATE IV INFUSION ADD-ON: CPT

## 2021-09-28 PROCEDURE — 63600175 PHARM REV CODE 636 W HCPCS: Performed by: PHYSICIAN ASSISTANT

## 2021-09-28 PROCEDURE — 84443 ASSAY THYROID STIM HORMONE: CPT | Performed by: PHYSICIAN ASSISTANT

## 2021-09-28 PROCEDURE — 25000003 PHARM REV CODE 250: Performed by: PHYSICIAN ASSISTANT

## 2021-09-28 PROCEDURE — 96372 THER/PROPH/DIAG INJ SC/IM: CPT | Mod: 59

## 2021-09-28 PROCEDURE — 85025 COMPLETE CBC W/AUTO DIFF WBC: CPT | Performed by: PHYSICIAN ASSISTANT

## 2021-09-28 PROCEDURE — 81000 URINALYSIS NONAUTO W/SCOPE: CPT | Performed by: PHYSICIAN ASSISTANT

## 2021-09-28 RX ORDER — DICYCLOMINE HYDROCHLORIDE 10 MG/ML
20 INJECTION INTRAMUSCULAR
Status: COMPLETED | OUTPATIENT
Start: 2021-09-28 | End: 2021-09-28

## 2021-09-28 RX ORDER — ONDANSETRON 2 MG/ML
4 INJECTION INTRAMUSCULAR; INTRAVENOUS
Status: COMPLETED | OUTPATIENT
Start: 2021-09-28 | End: 2021-09-28

## 2021-09-28 RX ADMIN — SODIUM CHLORIDE 1000 ML: 9 INJECTION, SOLUTION INTRAVENOUS at 05:09

## 2021-09-28 RX ADMIN — DICYCLOMINE HYDROCHLORIDE 20 MG: 20 INJECTION, SOLUTION INTRAMUSCULAR at 06:09

## 2021-09-28 RX ADMIN — ONDANSETRON 4 MG: 2 INJECTION INTRAMUSCULAR; INTRAVENOUS at 06:09

## 2022-05-10 ENCOUNTER — HOSPITAL ENCOUNTER (EMERGENCY)
Facility: HOSPITAL | Age: 42
Discharge: HOME | End: 2022-05-10
Attending: INTERNAL MEDICINE
Payer: MEDICAID

## 2022-05-10 VITALS
DIASTOLIC BLOOD PRESSURE: 76 MMHG | SYSTOLIC BLOOD PRESSURE: 132 MMHG | HEART RATE: 111 BPM | RESPIRATION RATE: 18 BRPM | HEIGHT: 62 IN | BODY MASS INDEX: 23.92 KG/M2 | OXYGEN SATURATION: 100 % | WEIGHT: 130 LBS | TEMPERATURE: 98 F

## 2022-05-10 DIAGNOSIS — R11.2 NON-INTRACTABLE VOMITING WITH NAUSEA, UNSPECIFIED VOMITING TYPE: Primary | ICD-10-CM

## 2022-05-10 LAB
ALBUMIN SERPL BCP-MCNC: 3.8 G/DL (ref 3.5–5.2)
ALP SERPL-CCNC: 223 U/L (ref 55–135)
ALT SERPL W/O P-5'-P-CCNC: 41 U/L (ref 10–44)
ANION GAP SERPL CALC-SCNC: 14 MMOL/L (ref 8–16)
AST SERPL-CCNC: 33 U/L (ref 10–40)
BASOPHILS # BLD AUTO: 0.02 K/UL (ref 0–0.2)
BASOPHILS NFR BLD: 0.3 % (ref 0–1.9)
BILIRUB SERPL-MCNC: 0.7 MG/DL (ref 0.1–1)
BUN SERPL-MCNC: 11 MG/DL (ref 6–20)
CALCIUM SERPL-MCNC: 9.6 MG/DL (ref 8.7–10.5)
CHLORIDE SERPL-SCNC: 100 MMOL/L (ref 95–110)
CO2 SERPL-SCNC: 27 MMOL/L (ref 23–29)
CREAT SERPL-MCNC: 0.6 MG/DL (ref 0.5–1.4)
DIFFERENTIAL METHOD: ABNORMAL
EOSINOPHIL # BLD AUTO: 0.1 K/UL (ref 0–0.5)
EOSINOPHIL NFR BLD: 1 % (ref 0–8)
ERYTHROCYTE [DISTWIDTH] IN BLOOD BY AUTOMATED COUNT: 14.1 % (ref 11.5–14.5)
EST. GFR  (AFRICAN AMERICAN): >60 ML/MIN/1.73 M^2
EST. GFR  (NON AFRICAN AMERICAN): >60 ML/MIN/1.73 M^2
GLUCOSE SERPL-MCNC: 153 MG/DL (ref 70–110)
HCT VFR BLD AUTO: 39.8 % (ref 37–48.5)
HGB BLD-MCNC: 12.6 G/DL (ref 12–16)
IMM GRANULOCYTES # BLD AUTO: 0.01 K/UL (ref 0–0.04)
IMM GRANULOCYTES NFR BLD AUTO: 0.1 % (ref 0–0.5)
LYMPHOCYTES # BLD AUTO: 3.1 K/UL (ref 1–4.8)
LYMPHOCYTES NFR BLD: 45.3 % (ref 18–48)
MCH RBC QN AUTO: 23.1 PG (ref 27–31)
MCHC RBC AUTO-ENTMCNC: 31.7 G/DL (ref 32–36)
MCV RBC AUTO: 73 FL (ref 82–98)
MONOCYTES # BLD AUTO: 0.8 K/UL (ref 0.3–1)
MONOCYTES NFR BLD: 10.9 % (ref 4–15)
NEUTROPHILS # BLD AUTO: 2.9 K/UL (ref 1.8–7.7)
NEUTROPHILS NFR BLD: 42.4 % (ref 38–73)
NRBC BLD-RTO: 0 /100 WBC
PLATELET # BLD AUTO: 194 K/UL (ref 150–450)
PMV BLD AUTO: 11.6 FL (ref 9.2–12.9)
POTASSIUM SERPL-SCNC: 3.2 MMOL/L (ref 3.5–5.1)
PROT SERPL-MCNC: 7.6 G/DL (ref 6–8.4)
RBC # BLD AUTO: 5.46 M/UL (ref 4–5.4)
SODIUM SERPL-SCNC: 141 MMOL/L (ref 136–145)
WBC # BLD AUTO: 6.88 K/UL (ref 3.9–12.7)

## 2022-05-10 PROCEDURE — 85025 COMPLETE CBC W/AUTO DIFF WBC: CPT | Performed by: PHYSICIAN ASSISTANT

## 2022-05-10 PROCEDURE — 96361 HYDRATE IV INFUSION ADD-ON: CPT

## 2022-05-10 PROCEDURE — 80053 COMPREHEN METABOLIC PANEL: CPT | Performed by: PHYSICIAN ASSISTANT

## 2022-05-10 PROCEDURE — 99284 EMERGENCY DEPT VISIT MOD MDM: CPT | Mod: 25

## 2022-05-10 PROCEDURE — 63600175 PHARM REV CODE 636 W HCPCS: Performed by: PHYSICIAN ASSISTANT

## 2022-05-10 PROCEDURE — 25000003 PHARM REV CODE 250: Performed by: PHYSICIAN ASSISTANT

## 2022-05-10 PROCEDURE — 96374 THER/PROPH/DIAG INJ IV PUSH: CPT

## 2022-05-10 RX ORDER — ONDANSETRON 4 MG/1
4 TABLET, ORALLY DISINTEGRATING ORAL EVERY 8 HOURS PRN
Qty: 30 TABLET | Refills: 0 | Status: ON HOLD | OUTPATIENT
Start: 2022-05-10 | End: 2023-03-08 | Stop reason: HOSPADM

## 2022-05-10 RX ORDER — ATENOLOL 25 MG/1
25 TABLET ORAL DAILY
Qty: 30 TABLET | Refills: 2 | Status: ON HOLD | OUTPATIENT
Start: 2022-05-10 | End: 2023-03-08 | Stop reason: SDUPTHER

## 2022-05-10 RX ORDER — ONDANSETRON 2 MG/ML
4 INJECTION INTRAMUSCULAR; INTRAVENOUS
Status: COMPLETED | OUTPATIENT
Start: 2022-05-10 | End: 2022-05-10

## 2022-05-10 RX ADMIN — SODIUM CHLORIDE 1000 ML: 0.9 INJECTION, SOLUTION INTRAVENOUS at 10:05

## 2022-05-10 RX ADMIN — ONDANSETRON 4 MG: 2 INJECTION INTRAMUSCULAR; INTRAVENOUS at 10:05

## 2022-05-10 NOTE — Clinical Note
"Morgan Maymushtaq Caro was seen and treated in our emergency department on 5/10/2022.  She may return to work on 05/13/2022.       If you have any questions or concerns, please don't hesitate to call.      Jonh Jaquez PA-C"

## 2022-05-11 NOTE — ED PROVIDER NOTES
Encounter Date: 5/10/2022       History     Chief Complaint   Patient presents with    Vomiting     Pt reports 3 episodes of vomiting today and reports her BP was elevated at home. BP is 129/81 in triage. Pt also reports that she does not take her BP medication that she is prescribed.      Chief Complaint: Vomiting  History of  Present Illness: History obtained from patient. This 42 y.o. female who has past medical history migraine headaches and hypertension presents to the ED complaining of nausea, vomiting epigastric abdominal pain that began today with 3 episodes of nonbloody nonbilious emesis.  Patient reports history of smoking marijuana daily and reports she has not smoked in 2 days.  Denies diarrhea, chest pain, shortness of breath, fever, chills, urinary symptoms.  No prior treatment for symptoms.  No known sick contacts.          Review of patient's allergies indicates:   Allergen Reactions    Ibuprofen Hives and Edema     Past Medical History:   Diagnosis Date    Migraines      History reviewed. No pertinent surgical history.  History reviewed. No pertinent family history.  Social History     Tobacco Use    Smoking status: Current Every Day Smoker     Packs/day: 2.00     Types: Cigarettes    Smokeless tobacco: Never Used    Tobacco comment: 1 pack of cigarettes will last the pt 2 days.   Substance Use Topics    Alcohol use: Yes     Comment: two drinks every 3 days.    Drug use: Yes     Types: Marijuana     Comment: uses marijuasa daily      Review of Systems   Constitutional: Negative for chills and fever.   HENT: Negative for congestion, rhinorrhea and sore throat.    Eyes: Negative for visual disturbance.   Respiratory: Negative for cough and shortness of breath.    Cardiovascular: Negative for chest pain.   Gastrointestinal: Positive for abdominal pain, nausea and vomiting. Negative for diarrhea.   Genitourinary: Negative for dysuria, frequency and hematuria.   Musculoskeletal: Negative for back  pain.   Skin: Negative for rash.   Neurological: Negative for dizziness, weakness and headaches.       Physical Exam     Initial Vitals [05/10/22 2105]   BP Pulse Resp Temp SpO2   129/81 (!) 123 18 98.2 °F (36.8 °C) 96 %      MAP       --         Physical Exam    Nursing note and vitals reviewed.  Constitutional: She appears well-developed and well-nourished. No distress.   HENT:   Head: Normocephalic and atraumatic.   Right Ear: Tympanic membrane normal.   Left Ear: Tympanic membrane normal.   Nose: Nose normal.   Mouth/Throat: Uvula is midline, oropharynx is clear and moist and mucous membranes are normal.   Eyes: EOM are normal. Pupils are equal, round, and reactive to light.   Neck: Trachea normal and phonation normal. Neck supple. No stridor present.   Normal range of motion.   Full passive range of motion without pain.     Cardiovascular: Normal rate, regular rhythm and normal heart sounds. Exam reveals no gallop and no friction rub.    No murmur heard.  Pulmonary/Chest: Effort normal and breath sounds normal. No respiratory distress. She has no wheezes. She has no rhonchi. She has no rales.   Abdominal: Abdomen is soft. Bowel sounds are normal. There is no abdominal tenderness. There is no rebound and no guarding.   Musculoskeletal:         General: Normal range of motion.      Cervical back: Full passive range of motion without pain, normal range of motion and neck supple. No rigidity. No spinous process tenderness or muscular tenderness. Normal range of motion.     Neurological: She is alert and oriented to person, place, and time. She has normal strength. No cranial nerve deficit or sensory deficit.   Skin: Skin is warm and dry. Capillary refill takes less than 2 seconds.   Psychiatric: She has a normal mood and affect.         ED Course   Procedures  Labs Reviewed   CBC W/ AUTO DIFFERENTIAL - Abnormal; Notable for the following components:       Result Value    RBC 5.46 (*)     MCV 73 (*)     MCH 23.1 (*)      MCHC 31.7 (*)     All other components within normal limits   COMPREHENSIVE METABOLIC PANEL - Abnormal; Notable for the following components:    Potassium 3.2 (*)     Glucose 153 (*)     Alkaline Phosphatase 223 (*)     All other components within normal limits   POCT URINE PREGNANCY          Imaging Results    None          Medications   sodium chloride 0.9% bolus 1,000 mL (0 mLs Intravenous Stopped 5/10/22 8403)   ondansetron injection 4 mg (4 mg Intravenous Given 5/10/22 2218)     Medical Decision Making:   ED Management:  This is an evaluation of a 42 y.o. female who presents to the ED for vomiting.  Vital signs are stable.  Patient tachycardic with heart rate of 123. Afebrile.  Patient is nontoxic appearing and in no acute distress.  Abdomen is soft and nontender palpation.  No rebound tenderness or guarding.  Mucous membranes appear slightly dry.    CBC shows no leukocytosis.  H&H stable.  CMP shows no significant electrolyte abnormalities.  BUN and creatinine are stable.  T bili within normal limits.  No transaminitis.  Lipase within normal limits.  UA shows no evidence of infection.    Given history of marijuana use with discontinue use 2 days ago, I suspect etiology of vomiting likely secondary to cannabinoid hyperemesis.  Patient treated the ED with IV fluids and Zofran.  Patient able tolerate p.o. without difficulty.  Will discharge patient home symptomatic care.  Patient also requesting refill on prescribed atenolol.    Patient given return precautions and instructed to return to the emergency department for any new or worsening symptoms. Patient verbalized understanding and agreed with plan. Encouraged follow-up with PCP.                        Clinical Impression:   Final diagnoses:  [R11.2] Non-intractable vomiting with nausea, unspecified vomiting type (Primary)          ED Disposition Condition    Discharge Stable        ED Prescriptions     Medication Sig Dispense Start Date End Date Auth.  Provider    ondansetron (ZOFRAN-ODT) 4 MG TbDL Take 1 tablet (4 mg total) by mouth every 8 (eight) hours as needed (nausea and vomiting). 30 tablet 5/10/2022  Jonh Jaquez PA-C    atenoloL (TENORMIN) 25 MG tablet Take 1 tablet (25 mg total) by mouth once daily. 30 tablet 5/10/2022  Jonh Jaquez PA-C        Follow-up Information     Follow up With Specialties Details Why Contact Info    [facility]  Schedule an appointment as soon as possible for a visit in 1 day For reevaluation [address] [place] [address]  [phone]      [facility] Emergency Medicine Go in 1 day If symptoms worsen [address] [facility] [id number] [phone]           Jonh Jaquez PA-C  05/11/22 0001

## 2022-05-11 NOTE — ED TRIAGE NOTES
Pt reports feeling bad while at work today. Reports x3 vomiting episodes and feels like she wants to pass out. States her blood pressure was elevated at home but states she is noncompliant with her home bp meds.

## 2023-03-03 ENCOUNTER — HOSPITAL ENCOUNTER (INPATIENT)
Facility: HOSPITAL | Age: 43
LOS: 5 days | Discharge: HOME | DRG: 645 | End: 2023-03-08
Attending: EMERGENCY MEDICINE | Admitting: INTERNAL MEDICINE
Payer: MEDICAID

## 2023-03-03 DIAGNOSIS — R65.10 SIRS (SYSTEMIC INFLAMMATORY RESPONSE SYNDROME): ICD-10-CM

## 2023-03-03 DIAGNOSIS — R07.9 CHEST PAIN: ICD-10-CM

## 2023-03-03 DIAGNOSIS — E05.80 THYROTOXICOSIS DUE TO ACUTE THYROIDITIS: ICD-10-CM

## 2023-03-03 DIAGNOSIS — I51.7 CARDIOMEGALY: ICD-10-CM

## 2023-03-03 DIAGNOSIS — E05.91 THYROTOXICOSIS WITH THYROTOXIC CRISIS, UNSPECIFIED THYROTOXICOSIS TYPE: Primary | ICD-10-CM

## 2023-03-03 DIAGNOSIS — E06.0 THYROTOXICOSIS DUE TO ACUTE THYROIDITIS: ICD-10-CM

## 2023-03-03 DIAGNOSIS — R00.0 TACHYCARDIA: ICD-10-CM

## 2023-03-03 LAB
ALBUMIN SERPL BCP-MCNC: 3.4 G/DL (ref 3.5–5.2)
ALP SERPL-CCNC: 302 U/L (ref 55–135)
ALT SERPL W/O P-5'-P-CCNC: 30 U/L (ref 10–44)
ANION GAP SERPL CALC-SCNC: 15 MMOL/L (ref 8–16)
AST SERPL-CCNC: 39 U/L (ref 10–40)
B-HCG UR QL: NEGATIVE
BASOPHILS # BLD AUTO: 0.02 K/UL (ref 0–0.2)
BASOPHILS NFR BLD: 0.2 % (ref 0–1.9)
BILIRUB SERPL-MCNC: 1.9 MG/DL (ref 0.1–1)
BILIRUB UR QL STRIP: NEGATIVE
BUN SERPL-MCNC: 6 MG/DL (ref 6–20)
CALCIUM SERPL-MCNC: 8.9 MG/DL (ref 8.7–10.5)
CHLORIDE SERPL-SCNC: 95 MMOL/L (ref 95–110)
CLARITY UR: CLEAR
CO2 SERPL-SCNC: 25 MMOL/L (ref 23–29)
COLOR UR: YELLOW
CREAT SERPL-MCNC: 0.5 MG/DL (ref 0.5–1.4)
CTP QC/QA: YES
DIFFERENTIAL METHOD: ABNORMAL
EOSINOPHIL # BLD AUTO: 0 K/UL (ref 0–0.5)
EOSINOPHIL NFR BLD: 0 % (ref 0–8)
ERYTHROCYTE [DISTWIDTH] IN BLOOD BY AUTOMATED COUNT: 14 % (ref 11.5–14.5)
EST. GFR  (NO RACE VARIABLE): >60 ML/MIN/1.73 M^2
GLUCOSE SERPL-MCNC: 97 MG/DL (ref 70–110)
GLUCOSE UR QL STRIP: NEGATIVE
HCT VFR BLD AUTO: 36.3 % (ref 37–48.5)
HGB BLD-MCNC: 12.1 G/DL (ref 12–16)
HGB UR QL STRIP: ABNORMAL
IMM GRANULOCYTES # BLD AUTO: 0.03 K/UL (ref 0–0.04)
IMM GRANULOCYTES NFR BLD AUTO: 0.3 % (ref 0–0.5)
KETONES UR QL STRIP: NEGATIVE
LACTATE SERPL-SCNC: 1.2 MMOL/L (ref 0.5–2.2)
LEUKOCYTE ESTERASE UR QL STRIP: NEGATIVE
LIPASE SERPL-CCNC: 51 U/L (ref 4–60)
LYMPHOCYTES # BLD AUTO: 1.1 K/UL (ref 1–4.8)
LYMPHOCYTES NFR BLD: 10.4 % (ref 18–48)
MCH RBC QN AUTO: 23.3 PG (ref 27–31)
MCHC RBC AUTO-ENTMCNC: 33.3 G/DL (ref 32–36)
MCV RBC AUTO: 70 FL (ref 82–98)
MOLECULAR STREP A: NEGATIVE
MONOCYTES # BLD AUTO: 1.2 K/UL (ref 0.3–1)
MONOCYTES NFR BLD: 11.6 % (ref 4–15)
NEUTROPHILS # BLD AUTO: 8.1 K/UL (ref 1.8–7.7)
NEUTROPHILS NFR BLD: 77.5 % (ref 38–73)
NITRITE UR QL STRIP: NEGATIVE
NRBC BLD-RTO: 0 /100 WBC
PH UR STRIP: 7 [PH] (ref 5–8)
PLATELET # BLD AUTO: 136 K/UL (ref 150–450)
PMV BLD AUTO: 10.8 FL (ref 9.2–12.9)
POC MOLECULAR INFLUENZA A AGN: NEGATIVE
POC MOLECULAR INFLUENZA B AGN: NEGATIVE
POTASSIUM SERPL-SCNC: 3.2 MMOL/L (ref 3.5–5.1)
PROCALCITONIN SERPL IA-MCNC: 0.11 NG/ML
PROT SERPL-MCNC: 7.2 G/DL (ref 6–8.4)
PROT UR QL STRIP: NEGATIVE
RBC # BLD AUTO: 5.19 M/UL (ref 4–5.4)
SARS-COV-2 RDRP RESP QL NAA+PROBE: NEGATIVE
SODIUM SERPL-SCNC: 135 MMOL/L (ref 136–145)
SP GR UR STRIP: 1 (ref 1–1.03)
T4 FREE SERPL-MCNC: >5 NG/DL (ref 0.71–1.51)
TSH SERPL DL<=0.005 MIU/L-ACNC: <0.01 UIU/ML (ref 0.4–4)
URN SPEC COLLECT METH UR: ABNORMAL
UROBILINOGEN UR STRIP-ACNC: NEGATIVE EU/DL
WBC # BLD AUTO: 10.46 K/UL (ref 3.9–12.7)

## 2023-03-03 PROCEDURE — 84439 ASSAY OF FREE THYROXINE: CPT | Performed by: EMERGENCY MEDICINE

## 2023-03-03 PROCEDURE — 81025 URINE PREGNANCY TEST: CPT | Performed by: EMERGENCY MEDICINE

## 2023-03-03 PROCEDURE — 63600175 PHARM REV CODE 636 W HCPCS: Performed by: EMERGENCY MEDICINE

## 2023-03-03 PROCEDURE — 83605 ASSAY OF LACTIC ACID: CPT | Performed by: EMERGENCY MEDICINE

## 2023-03-03 PROCEDURE — 83690 ASSAY OF LIPASE: CPT | Performed by: EMERGENCY MEDICINE

## 2023-03-03 PROCEDURE — 87502 INFLUENZA DNA AMP PROBE: CPT

## 2023-03-03 PROCEDURE — 96375 TX/PRO/DX INJ NEW DRUG ADDON: CPT

## 2023-03-03 PROCEDURE — 84480 ASSAY TRIIODOTHYRONINE (T3): CPT | Performed by: EMERGENCY MEDICINE

## 2023-03-03 PROCEDURE — 93010 EKG 12-LEAD: ICD-10-PCS | Mod: ,,, | Performed by: INTERNAL MEDICINE

## 2023-03-03 PROCEDURE — 85025 COMPLETE CBC W/AUTO DIFF WBC: CPT | Performed by: EMERGENCY MEDICINE

## 2023-03-03 PROCEDURE — 80053 COMPREHEN METABOLIC PANEL: CPT | Performed by: EMERGENCY MEDICINE

## 2023-03-03 PROCEDURE — 84145 PROCALCITONIN (PCT): CPT | Performed by: EMERGENCY MEDICINE

## 2023-03-03 PROCEDURE — 36000 PLACE NEEDLE IN VEIN: CPT

## 2023-03-03 PROCEDURE — 96361 HYDRATE IV INFUSION ADD-ON: CPT

## 2023-03-03 PROCEDURE — 96365 THER/PROPH/DIAG IV INF INIT: CPT

## 2023-03-03 PROCEDURE — 93010 ELECTROCARDIOGRAM REPORT: CPT | Mod: ,,, | Performed by: INTERNAL MEDICINE

## 2023-03-03 PROCEDURE — 25000003 PHARM REV CODE 250: Performed by: EMERGENCY MEDICINE

## 2023-03-03 PROCEDURE — 93005 ELECTROCARDIOGRAM TRACING: CPT

## 2023-03-03 PROCEDURE — 12000002 HC ACUTE/MED SURGE SEMI-PRIVATE ROOM

## 2023-03-03 PROCEDURE — 84443 ASSAY THYROID STIM HORMONE: CPT | Performed by: EMERGENCY MEDICINE

## 2023-03-03 PROCEDURE — 99285 EMERGENCY DEPT VISIT HI MDM: CPT | Mod: 25

## 2023-03-03 PROCEDURE — 81003 URINALYSIS AUTO W/O SCOPE: CPT | Performed by: EMERGENCY MEDICINE

## 2023-03-03 PROCEDURE — 87040 BLOOD CULTURE FOR BACTERIA: CPT | Mod: 59 | Performed by: EMERGENCY MEDICINE

## 2023-03-03 PROCEDURE — 87651 STREP A DNA AMP PROBE: CPT

## 2023-03-03 RX ORDER — ACETAMINOPHEN 500 MG
1000 TABLET ORAL
Status: COMPLETED | OUTPATIENT
Start: 2023-03-03 | End: 2023-03-03

## 2023-03-03 RX ORDER — ONDANSETRON 2 MG/ML
8 INJECTION INTRAMUSCULAR; INTRAVENOUS
Status: COMPLETED | OUTPATIENT
Start: 2023-03-03 | End: 2023-03-03

## 2023-03-03 RX ADMIN — ONDANSETRON 8 MG: 2 INJECTION INTRAMUSCULAR; INTRAVENOUS at 11:03

## 2023-03-03 RX ADMIN — IOHEXOL 70 ML: 350 INJECTION, SOLUTION INTRAVENOUS at 11:03

## 2023-03-03 RX ADMIN — ACETAMINOPHEN 1000 MG: 500 TABLET ORAL at 09:03

## 2023-03-03 RX ADMIN — PIPERACILLIN AND TAZOBACTAM 4.5 G: 4; .5 INJECTION, POWDER, LYOPHILIZED, FOR SOLUTION INTRAVENOUS; PARENTERAL at 11:03

## 2023-03-03 RX ADMIN — SODIUM CHLORIDE 1770 ML: 9 INJECTION, SOLUTION INTRAVENOUS at 10:03

## 2023-03-04 PROBLEM — I51.7 CARDIOMEGALY: Status: ACTIVE | Noted: 2023-03-04

## 2023-03-04 PROBLEM — I10 PRIMARY HYPERTENSION: Status: ACTIVE | Noted: 2023-03-04

## 2023-03-04 PROBLEM — E87.6 HYPOKALEMIA: Status: ACTIVE | Noted: 2023-03-04

## 2023-03-04 PROBLEM — E05.91 THYROTOXICOSIS WITH THYROTOXIC CRISIS: Status: ACTIVE | Noted: 2023-03-04

## 2023-03-04 LAB
ALBUMIN SERPL BCP-MCNC: 3.2 G/DL (ref 3.5–5.2)
ALP SERPL-CCNC: 291 U/L (ref 55–135)
ALT SERPL W/O P-5'-P-CCNC: 28 U/L (ref 10–44)
AMPHET+METHAMPHET UR QL: NEGATIVE
ANION GAP SERPL CALC-SCNC: 12 MMOL/L (ref 8–16)
AST SERPL-CCNC: 35 U/L (ref 10–40)
AV INDEX (PROSTH): 0.95
AV MEAN GRADIENT: 5 MMHG
AV PEAK GRADIENT: 7 MMHG
AV VALVE AREA: 3.3 CM2
AV VELOCITY RATIO: 1.02
BARBITURATES UR QL SCN>200 NG/ML: NEGATIVE
BASOPHILS # BLD AUTO: 0.02 K/UL (ref 0–0.2)
BASOPHILS NFR BLD: 0.2 % (ref 0–1.9)
BENZODIAZ UR QL SCN>200 NG/ML: NEGATIVE
BILIRUB SERPL-MCNC: 2 MG/DL (ref 0.1–1)
BNP SERPL-MCNC: 570 PG/ML (ref 0–99)
BSA FOR ECHO PROCEDURE: 1.53 M2
BUN SERPL-MCNC: 5 MG/DL (ref 6–20)
BZE UR QL SCN: NEGATIVE
C DIFF GDH STL QL: NEGATIVE
C DIFF TOX A+B STL QL IA: NEGATIVE
CALCIUM SERPL-MCNC: 9.4 MG/DL (ref 8.7–10.5)
CANNABINOIDS UR QL SCN: ABNORMAL
CHLORIDE SERPL-SCNC: 102 MMOL/L (ref 95–110)
CK SERPL-CCNC: 35 U/L (ref 20–180)
CO2 SERPL-SCNC: 23 MMOL/L (ref 23–29)
CREAT SERPL-MCNC: 0.4 MG/DL (ref 0.5–1.4)
CREAT UR-MCNC: 20.9 MG/DL (ref 15–325)
CV ECHO LV RWT: 0.35 CM
DIFFERENTIAL METHOD: ABNORMAL
DOP CALC AO PEAK VEL: 1.34 M/S
DOP CALC AO VTI: 21.9 CM
DOP CALC LVOT AREA: 3.5 CM2
DOP CALC LVOT DIAMETER: 2.1 CM
DOP CALC LVOT PEAK VEL: 1.37 M/S
DOP CALC LVOT STROKE VOLUME: 72.35 CM3
DOP CALCLVOT PEAK VEL VTI: 20.9 CM
E WAVE DECELERATION TIME: 123.84 MSEC
E/A RATIO: 1.56
E/E' RATIO: 8.6 M/S
ECHO LV POSTERIOR WALL: 0.87 CM (ref 0.6–1.1)
EJECTION FRACTION: 55 %
EOSINOPHIL # BLD AUTO: 0 K/UL (ref 0–0.5)
EOSINOPHIL NFR BLD: 0 % (ref 0–8)
ERYTHROCYTE [DISTWIDTH] IN BLOOD BY AUTOMATED COUNT: 14.4 % (ref 11.5–14.5)
EST. GFR  (NO RACE VARIABLE): >60 ML/MIN/1.73 M^2
FRACTIONAL SHORTENING: 26 % (ref 28–44)
GLUCOSE SERPL-MCNC: 108 MG/DL (ref 70–110)
HCT VFR BLD AUTO: 37.1 % (ref 37–48.5)
HGB BLD-MCNC: 11.9 G/DL (ref 12–16)
IMM GRANULOCYTES # BLD AUTO: 0.02 K/UL (ref 0–0.04)
IMM GRANULOCYTES NFR BLD AUTO: 0.2 % (ref 0–0.5)
INTERVENTRICULAR SEPTUM: 0.77 CM (ref 0.6–1.1)
IVC DIAMETER: 25 CM
LA MAJOR: 7.02 CM
LA MINOR: 5.86 CM
LA WIDTH: 5.8 CM
LEFT ATRIUM SIZE: 4.57 CM
LEFT ATRIUM VOLUME INDEX: 94.7 ML/M2
LEFT ATRIUM VOLUME: 143.92 CM3
LEFT INTERNAL DIMENSION IN SYSTOLE: 3.67 CM (ref 2.1–4)
LEFT VENTRICLE DIASTOLIC VOLUME INDEX: 76.05 ML/M2
LEFT VENTRICLE DIASTOLIC VOLUME: 115.59 ML
LEFT VENTRICLE MASS INDEX: 91 G/M2
LEFT VENTRICLE SYSTOLIC VOLUME INDEX: 37.6 ML/M2
LEFT VENTRICLE SYSTOLIC VOLUME: 57.13 ML
LEFT VENTRICULAR INTERNAL DIMENSION IN DIASTOLE: 4.95 CM (ref 3.5–6)
LEFT VENTRICULAR MASS: 137.8 G
LV LATERAL E/E' RATIO: 7.17 M/S
LV SEPTAL E/E' RATIO: 10.75 M/S
LVOT MG: 4.39 MMHG
LVOT MV: 1.01 CM/S
LYMPHOCYTES # BLD AUTO: 0.7 K/UL (ref 1–4.8)
LYMPHOCYTES NFR BLD: 8.1 % (ref 18–48)
MAGNESIUM SERPL-MCNC: 1.4 MG/DL (ref 1.6–2.6)
MCH RBC QN AUTO: 23.2 PG (ref 27–31)
MCHC RBC AUTO-ENTMCNC: 32.1 G/DL (ref 32–36)
MCV RBC AUTO: 72 FL (ref 82–98)
METHADONE UR QL SCN>300 NG/ML: NEGATIVE
MONOCYTES # BLD AUTO: 0.4 K/UL (ref 0.3–1)
MONOCYTES NFR BLD: 4.8 % (ref 4–15)
MV PEAK A VEL: 0.55 M/S
MV PEAK E VEL: 0.86 M/S
MV STENOSIS PRESSURE HALF TIME: 35.91 MS
MV VALVE AREA P 1/2 METHOD: 6.13 CM2
NEUTROPHILS # BLD AUTO: 7 K/UL (ref 1.8–7.7)
NEUTROPHILS NFR BLD: 86.7 % (ref 38–73)
NRBC BLD-RTO: 0 /100 WBC
OB PNL STL: POSITIVE
OPIATES UR QL SCN: NEGATIVE
PCP UR QL SCN>25 NG/ML: NEGATIVE
PHOSPHATE SERPL-MCNC: 3.9 MG/DL (ref 2.7–4.5)
PISA TR MAX VEL: 3.37 M/S
PLATELET # BLD AUTO: 131 K/UL (ref 150–450)
PMV BLD AUTO: ABNORMAL FL (ref 9.2–12.9)
POTASSIUM SERPL-SCNC: 3.3 MMOL/L (ref 3.5–5.1)
PROT SERPL-MCNC: 6.7 G/DL (ref 6–8.4)
PV PEAK VELOCITY: 1.07 CM/S
RA MAJOR: 5.33 CM
RA PRESSURE: 15 MMHG
RA WIDTH: 4.8 CM
RBC # BLD AUTO: 5.14 M/UL (ref 4–5.4)
RIGHT VENTRICULAR END-DIASTOLIC DIMENSION: 4.26 CM
RV AG STL QL IA.RAPID: NEGATIVE
RV TISSUE DOPPLER FREE WALL SYSTOLIC VELOCITY 1 (APICAL 4 CHAMBER VIEW): 0.01 CM/S
SINUS: 3.8 CM
SODIUM SERPL-SCNC: 137 MMOL/L (ref 136–145)
STJ: 2.97 CM
T3 SERPL-MCNC: >500 NG/DL (ref 60–180)
TDI LATERAL: 0.12 M/S
TDI SEPTAL: 0.08 M/S
TDI: 0.1 M/S
TOXICOLOGY INFORMATION: ABNORMAL
TR MAX PG: 45 MMHG
TRICUSPID ANNULAR PLANE SYSTOLIC EXCURSION: 2.68 CM
TV REST PULMONARY ARTERY PRESSURE: 60 MMHG
WBC # BLD AUTO: 8.12 K/UL (ref 3.9–12.7)
WBC #/AREA STL HPF: NORMAL /[HPF]

## 2023-03-04 PROCEDURE — 83735 ASSAY OF MAGNESIUM: CPT | Performed by: INTERNAL MEDICINE

## 2023-03-04 PROCEDURE — 94760 N-INVAS EAR/PLS OXIMETRY 1: CPT

## 2023-03-04 PROCEDURE — 80053 COMPREHEN METABOLIC PANEL: CPT | Performed by: INTERNAL MEDICINE

## 2023-03-04 PROCEDURE — 63600175 PHARM REV CODE 636 W HCPCS: Performed by: INTERNAL MEDICINE

## 2023-03-04 PROCEDURE — 89055 LEUKOCYTE ASSESSMENT FECAL: CPT | Performed by: EMERGENCY MEDICINE

## 2023-03-04 PROCEDURE — 82705 FATS/LIPIDS FECES QUAL: CPT | Performed by: EMERGENCY MEDICINE

## 2023-03-04 PROCEDURE — 87338 HPYLORI STOOL AG IA: CPT | Performed by: EMERGENCY MEDICINE

## 2023-03-04 PROCEDURE — 83880 ASSAY OF NATRIURETIC PEPTIDE: CPT | Performed by: INTERNAL MEDICINE

## 2023-03-04 PROCEDURE — 93010 ELECTROCARDIOGRAM REPORT: CPT | Mod: ,,, | Performed by: INTERNAL MEDICINE

## 2023-03-04 PROCEDURE — 63600175 PHARM REV CODE 636 W HCPCS: Performed by: HOSPITALIST

## 2023-03-04 PROCEDURE — 63600175 PHARM REV CODE 636 W HCPCS: Performed by: EMERGENCY MEDICINE

## 2023-03-04 PROCEDURE — 87209 SMEAR COMPLEX STAIN: CPT | Performed by: EMERGENCY MEDICINE

## 2023-03-04 PROCEDURE — 87425 ROTAVIRUS AG IA: CPT | Performed by: EMERGENCY MEDICINE

## 2023-03-04 PROCEDURE — 87177 OVA AND PARASITES SMEARS: CPT | Performed by: EMERGENCY MEDICINE

## 2023-03-04 PROCEDURE — 87329 GIARDIA AG IA: CPT | Performed by: EMERGENCY MEDICINE

## 2023-03-04 PROCEDURE — 25500020 PHARM REV CODE 255: Performed by: EMERGENCY MEDICINE

## 2023-03-04 PROCEDURE — 93010 EKG 12-LEAD: ICD-10-PCS | Mod: ,,, | Performed by: INTERNAL MEDICINE

## 2023-03-04 PROCEDURE — 83993 ASSAY FOR CALPROTECTIN FECAL: CPT | Performed by: EMERGENCY MEDICINE

## 2023-03-04 PROCEDURE — 84480 ASSAY TRIIODOTHYRONINE (T3): CPT | Performed by: INTERNAL MEDICINE

## 2023-03-04 PROCEDURE — 87449 NOS EACH ORGANISM AG IA: CPT | Performed by: EMERGENCY MEDICINE

## 2023-03-04 PROCEDURE — 25000003 PHARM REV CODE 250: Performed by: INTERNAL MEDICINE

## 2023-03-04 PROCEDURE — 21400001 HC TELEMETRY ROOM

## 2023-03-04 PROCEDURE — 96367 TX/PROPH/DG ADDL SEQ IV INF: CPT

## 2023-03-04 PROCEDURE — 25000003 PHARM REV CODE 250: Performed by: HOSPITALIST

## 2023-03-04 PROCEDURE — 25000003 PHARM REV CODE 250: Performed by: STUDENT IN AN ORGANIZED HEALTH CARE EDUCATION/TRAINING PROGRAM

## 2023-03-04 PROCEDURE — 25000003 PHARM REV CODE 250: Performed by: EMERGENCY MEDICINE

## 2023-03-04 PROCEDURE — 84100 ASSAY OF PHOSPHORUS: CPT | Performed by: INTERNAL MEDICINE

## 2023-03-04 PROCEDURE — 96375 TX/PRO/DX INJ NEW DRUG ADDON: CPT

## 2023-03-04 PROCEDURE — 82550 ASSAY OF CK (CPK): CPT | Performed by: INTERNAL MEDICINE

## 2023-03-04 PROCEDURE — S4991 NICOTINE PATCH NONLEGEND: HCPCS | Performed by: STUDENT IN AN ORGANIZED HEALTH CARE EDUCATION/TRAINING PROGRAM

## 2023-03-04 PROCEDURE — 87045 FECES CULTURE AEROBIC BACT: CPT | Performed by: EMERGENCY MEDICINE

## 2023-03-04 PROCEDURE — 87427 SHIGA-LIKE TOXIN AG IA: CPT | Mod: 59 | Performed by: EMERGENCY MEDICINE

## 2023-03-04 PROCEDURE — 87046 STOOL CULTR AEROBIC BACT EA: CPT | Performed by: EMERGENCY MEDICINE

## 2023-03-04 PROCEDURE — 82272 OCCULT BLD FECES 1-3 TESTS: CPT | Performed by: EMERGENCY MEDICINE

## 2023-03-04 PROCEDURE — 85025 COMPLETE CBC W/AUTO DIFF WBC: CPT | Performed by: INTERNAL MEDICINE

## 2023-03-04 PROCEDURE — 93005 ELECTROCARDIOGRAM TRACING: CPT

## 2023-03-04 PROCEDURE — 83520 IMMUNOASSAY QUANT NOS NONAB: CPT | Performed by: INTERNAL MEDICINE

## 2023-03-04 PROCEDURE — 80307 DRUG TEST PRSMV CHEM ANLYZR: CPT | Performed by: EMERGENCY MEDICINE

## 2023-03-04 PROCEDURE — 82653 EL-1 FECAL QUANTITATIVE: CPT | Performed by: EMERGENCY MEDICINE

## 2023-03-04 RX ORDER — LORAZEPAM 2 MG/ML
0.5 INJECTION INTRAMUSCULAR ONCE
Status: COMPLETED | OUTPATIENT
Start: 2023-03-04 | End: 2023-03-04

## 2023-03-04 RX ORDER — PROCHLORPERAZINE EDISYLATE 5 MG/ML
5 INJECTION INTRAMUSCULAR; INTRAVENOUS EVERY 6 HOURS PRN
Status: DISCONTINUED | OUTPATIENT
Start: 2023-03-04 | End: 2023-03-08 | Stop reason: HOSPADM

## 2023-03-04 RX ORDER — SODIUM CHLORIDE AND POTASSIUM CHLORIDE 150; 900 MG/100ML; MG/100ML
INJECTION, SOLUTION INTRAVENOUS CONTINUOUS
Status: DISPENSED | OUTPATIENT
Start: 2023-03-04 | End: 2023-03-04

## 2023-03-04 RX ORDER — TALC
6 POWDER (GRAM) TOPICAL NIGHTLY PRN
Status: DISCONTINUED | OUTPATIENT
Start: 2023-03-04 | End: 2023-03-08 | Stop reason: HOSPADM

## 2023-03-04 RX ORDER — DEXTROSE 40 %
30 GEL (GRAM) ORAL
Status: DISCONTINUED | OUTPATIENT
Start: 2023-03-04 | End: 2023-03-08 | Stop reason: HOSPADM

## 2023-03-04 RX ORDER — PROPYLTHIOURACIL 50 MG/1
400 TABLET ORAL ONCE
Status: COMPLETED | OUTPATIENT
Start: 2023-03-04 | End: 2023-03-04

## 2023-03-04 RX ORDER — OXYCODONE HYDROCHLORIDE 5 MG/1
5 TABLET ORAL EVERY 6 HOURS PRN
Status: DISCONTINUED | OUTPATIENT
Start: 2023-03-04 | End: 2023-03-04

## 2023-03-04 RX ORDER — LORAZEPAM 2 MG/ML
1 INJECTION INTRAMUSCULAR EVERY 4 HOURS PRN
Status: DISCONTINUED | OUTPATIENT
Start: 2023-03-04 | End: 2023-03-08 | Stop reason: HOSPADM

## 2023-03-04 RX ORDER — METHIMAZOLE 5 MG/1
20 TABLET ORAL ONCE
Status: DISCONTINUED | OUTPATIENT
Start: 2023-03-04 | End: 2023-03-04

## 2023-03-04 RX ORDER — ACETAMINOPHEN 325 MG/1
650 TABLET ORAL EVERY 4 HOURS PRN
Status: DISCONTINUED | OUTPATIENT
Start: 2023-03-04 | End: 2023-03-08 | Stop reason: HOSPADM

## 2023-03-04 RX ORDER — SODIUM CHLORIDE 0.9 % (FLUSH) 0.9 %
10 SYRINGE (ML) INJECTION EVERY 12 HOURS PRN
Status: DISCONTINUED | OUTPATIENT
Start: 2023-03-04 | End: 2023-03-08 | Stop reason: HOSPADM

## 2023-03-04 RX ORDER — MAG HYDROX/ALUMINUM HYD/SIMETH 200-200-20
30 SUSPENSION, ORAL (FINAL DOSE FORM) ORAL 4 TIMES DAILY PRN
Status: DISCONTINUED | OUTPATIENT
Start: 2023-03-04 | End: 2023-03-08 | Stop reason: HOSPADM

## 2023-03-04 RX ORDER — LOPERAMIDE HYDROCHLORIDE 2 MG/1
2 CAPSULE ORAL 4 TIMES DAILY PRN
Status: DISCONTINUED | OUTPATIENT
Start: 2023-03-04 | End: 2023-03-08 | Stop reason: HOSPADM

## 2023-03-04 RX ORDER — AMOXICILLIN 250 MG
1 CAPSULE ORAL 2 TIMES DAILY PRN
Status: DISCONTINUED | OUTPATIENT
Start: 2023-03-04 | End: 2023-03-08 | Stop reason: HOSPADM

## 2023-03-04 RX ORDER — DEXTROSE 40 %
15 GEL (GRAM) ORAL
Status: DISCONTINUED | OUTPATIENT
Start: 2023-03-04 | End: 2023-03-08 | Stop reason: HOSPADM

## 2023-03-04 RX ORDER — ONDANSETRON 2 MG/ML
8 INJECTION INTRAMUSCULAR; INTRAVENOUS EVERY 6 HOURS PRN
Status: DISCONTINUED | OUTPATIENT
Start: 2023-03-04 | End: 2023-03-08 | Stop reason: HOSPADM

## 2023-03-04 RX ORDER — ONDANSETRON 2 MG/ML
4 INJECTION INTRAMUSCULAR; INTRAVENOUS EVERY 8 HOURS PRN
Status: DISCONTINUED | OUTPATIENT
Start: 2023-03-04 | End: 2023-03-04

## 2023-03-04 RX ORDER — PROPYLTHIOURACIL 50 MG/1
200 TABLET ORAL EVERY 4 HOURS
Status: DISCONTINUED | OUTPATIENT
Start: 2023-03-04 | End: 2023-03-06

## 2023-03-04 RX ORDER — IBUPROFEN 200 MG
1 TABLET ORAL DAILY
Status: DISCONTINUED | OUTPATIENT
Start: 2023-03-05 | End: 2023-03-04

## 2023-03-04 RX ORDER — SIMETHICONE 80 MG
1 TABLET,CHEWABLE ORAL 4 TIMES DAILY PRN
Status: DISCONTINUED | OUTPATIENT
Start: 2023-03-04 | End: 2023-03-08 | Stop reason: HOSPADM

## 2023-03-04 RX ORDER — ENOXAPARIN SODIUM 100 MG/ML
40 INJECTION SUBCUTANEOUS EVERY 24 HOURS
Status: DISCONTINUED | OUTPATIENT
Start: 2023-03-04 | End: 2023-03-08 | Stop reason: HOSPADM

## 2023-03-04 RX ORDER — GLUCAGON 1 MG
1 KIT INJECTION
Status: DISCONTINUED | OUTPATIENT
Start: 2023-03-04 | End: 2023-03-05

## 2023-03-04 RX ORDER — OXYCODONE HYDROCHLORIDE 5 MG/1
5 TABLET ORAL EVERY 4 HOURS PRN
Status: DISCONTINUED | OUTPATIENT
Start: 2023-03-04 | End: 2023-03-08 | Stop reason: HOSPADM

## 2023-03-04 RX ORDER — SODIUM CHLORIDE AND POTASSIUM CHLORIDE 150; 900 MG/100ML; MG/100ML
INJECTION, SOLUTION INTRAVENOUS CONTINUOUS
Status: DISCONTINUED | OUTPATIENT
Start: 2023-03-04 | End: 2023-03-04

## 2023-03-04 RX ORDER — LORAZEPAM 2 MG/ML
1 INJECTION INTRAMUSCULAR ONCE
Status: COMPLETED | OUTPATIENT
Start: 2023-03-04 | End: 2023-03-04

## 2023-03-04 RX ORDER — NALOXONE HCL 0.4 MG/ML
0.02 VIAL (ML) INJECTION
Status: DISCONTINUED | OUTPATIENT
Start: 2023-03-04 | End: 2023-03-08 | Stop reason: HOSPADM

## 2023-03-04 RX ORDER — ACETAMINOPHEN 325 MG/1
650 TABLET ORAL EVERY 4 HOURS PRN
Status: DISCONTINUED | OUTPATIENT
Start: 2023-03-04 | End: 2023-03-04

## 2023-03-04 RX ORDER — IBUPROFEN 200 MG
1 TABLET ORAL DAILY
Status: DISCONTINUED | OUTPATIENT
Start: 2023-03-04 | End: 2023-03-08 | Stop reason: HOSPADM

## 2023-03-04 RX ORDER — PROPRANOLOL HYDROCHLORIDE 1 MG/ML
1 INJECTION INTRAVENOUS ONCE
Status: COMPLETED | OUTPATIENT
Start: 2023-03-04 | End: 2023-03-04

## 2023-03-04 RX ADMIN — PROPRANOLOL HYDROCHLORIDE 60 MG: 40 TABLET ORAL at 01:03

## 2023-03-04 RX ADMIN — OXYCODONE HYDROCHLORIDE 5 MG: 5 TABLET ORAL at 09:03

## 2023-03-04 RX ADMIN — HYDROCORTISONE SODIUM SUCCINATE 100 MG: 100 INJECTION, POWDER, FOR SOLUTION INTRAMUSCULAR; INTRAVENOUS at 02:03

## 2023-03-04 RX ADMIN — IODINE SOLUTION STRONG 5% (LUGOL'S) 3 DROP: 5 SOLUTION at 05:03

## 2023-03-04 RX ADMIN — OXYCODONE HYDROCHLORIDE 5 MG: 5 TABLET ORAL at 02:03

## 2023-03-04 RX ADMIN — SODIUM CHLORIDE AND POTASSIUM CHLORIDE: 9; 1.49 INJECTION, SOLUTION INTRAVENOUS at 12:03

## 2023-03-04 RX ADMIN — PROPYLTHIOURACIL 200 MG: 50 TABLET ORAL at 03:03

## 2023-03-04 RX ADMIN — Medication 1 PATCH: at 10:03

## 2023-03-04 RX ADMIN — Medication 6 MG: at 10:03

## 2023-03-04 RX ADMIN — LORAZEPAM 1 MG: 2 INJECTION INTRAMUSCULAR; INTRAVENOUS at 08:03

## 2023-03-04 RX ADMIN — LOPERAMIDE HYDROCHLORIDE 2 MG: 2 CAPSULE ORAL at 02:03

## 2023-03-04 RX ADMIN — HYDROCORTISONE SODIUM SUCCINATE 100 MG: 100 INJECTION, POWDER, FOR SOLUTION INTRAMUSCULAR; INTRAVENOUS at 01:03

## 2023-03-04 RX ADMIN — PROPRANOLOL HYDROCHLORIDE 60 MG: 40 TABLET ORAL at 03:03

## 2023-03-04 RX ADMIN — HYDROCORTISONE SODIUM SUCCINATE 100 MG: 100 INJECTION, POWDER, FOR SOLUTION INTRAMUSCULAR; INTRAVENOUS at 09:03

## 2023-03-04 RX ADMIN — PROPYLTHIOURACIL 200 MG: 50 TABLET ORAL at 09:03

## 2023-03-04 RX ADMIN — IODINE SOLUTION STRONG 5% (LUGOL'S) 3 DROP: 5 SOLUTION at 12:03

## 2023-03-04 RX ADMIN — LOPERAMIDE HYDROCHLORIDE 2 MG: 2 CAPSULE ORAL at 08:03

## 2023-03-04 RX ADMIN — PROPYLTHIOURACIL 200 MG: 50 TABLET ORAL at 10:03

## 2023-03-04 RX ADMIN — IODINE SOLUTION STRONG 5% (LUGOL'S) 3 DROP: 5 SOLUTION at 11:03

## 2023-03-04 RX ADMIN — VANCOMYCIN HYDROCHLORIDE 1500 MG: 1.5 INJECTION, POWDER, LYOPHILIZED, FOR SOLUTION INTRAVENOUS at 12:03

## 2023-03-04 RX ADMIN — IODINE SOLUTION STRONG 5% (LUGOL'S) 3 DROP: 5 SOLUTION at 03:03

## 2023-03-04 RX ADMIN — ONDANSETRON 8 MG: 2 INJECTION INTRAMUSCULAR; INTRAVENOUS at 05:03

## 2023-03-04 RX ADMIN — PROPYLTHIOURACIL 200 MG: 50 TABLET ORAL at 05:03

## 2023-03-04 RX ADMIN — PROPYLTHIOURACIL 400 MG: 50 TABLET ORAL at 12:03

## 2023-03-04 RX ADMIN — PROPRANOLOL HYDROCHLORIDE 60 MG: 40 TABLET ORAL at 05:03

## 2023-03-04 RX ADMIN — PROPRANOLOL HYDROCHLORIDE 60 MG: 40 TABLET ORAL at 12:03

## 2023-03-04 RX ADMIN — MINERAL OIL, PETROLATUM, PHENYLEPHRINE HCL 1 APPLICATOR: 2.5; 140; 749 OINTMENT TOPICAL at 10:03

## 2023-03-04 RX ADMIN — SODIUM CHLORIDE AND POTASSIUM CHLORIDE: 9; 1.49 INJECTION, SOLUTION INTRAVENOUS at 02:03

## 2023-03-04 RX ADMIN — LORAZEPAM 0.5 MG: 2 INJECTION INTRAMUSCULAR; INTRAVENOUS at 02:03

## 2023-03-04 RX ADMIN — PROPYLTHIOURACIL 200 MG: 50 TABLET ORAL at 02:03

## 2023-03-04 RX ADMIN — PROPRANOLOL HYDROCHLORIDE 60 MG: 40 TABLET ORAL at 11:03

## 2023-03-04 RX ADMIN — HYDROCORTISONE SODIUM SUCCINATE 100 MG: 100 INJECTION, POWDER, FOR SOLUTION INTRAMUSCULAR; INTRAVENOUS at 08:03

## 2023-03-04 RX ADMIN — PROPRANOLOL HYDROCHLORIDE 1 MG: 1 INJECTION, SOLUTION INTRAVENOUS at 12:03

## 2023-03-04 RX ADMIN — ENOXAPARIN SODIUM 40 MG: 40 INJECTION SUBCUTANEOUS at 05:03

## 2023-03-04 NOTE — PLAN OF CARE
AAO X4. NSR. BSC in room. Skin intact. PIV X 1. 20 meq K+ gtt @ 100. Special contact precaution in place. Care update with patient. Free from fall and injuries.

## 2023-03-04 NOTE — CARE UPDATE
Patient seen and examined.  See H/P, treatment and plan per dr. Chappell.  42 y/o female presents with thyrotoxic crisis.   [facility] Endocrinology consulted.  Recommended:          1) PTU loading dose 400mg, then 200mg Q4 hours          2) Propranolol 60-80mg po Q6 hours          3) Hydrocortisone 100mg iv q8 hours          4) SSKI drops, 3-4 drops po 3-4 times per day  Recommended monitoring overnight in ICU as patient presented febrile and tachycardic.  Now afebrile and hemodynamically stable.  Continue current management and call Endo at [facility] tomorrow for further recommendations.

## 2023-03-04 NOTE — H&P
"[facility]  [facility] Medicine  History & Physical    Patient Name: Morgan Caro  MRN: [id number]  Patient Class: IP- Inpatient  Admission Date: 3/3/2023  Attending Physician: Chery Metcalf MD   Primary Care Provider: Primary Doctor No         Patient information was obtained from patient and ER records.     Subjective:     Principal Problem:Thyrotoxicosis with thyrotoxic crisis    Chief Complaint:   Chief Complaint   Patient presents with    Sore Throat     Today with shortness        HPI:   Ms Caro is a 44 yo female with a past medical history of migraine headaches and HTN.    Pt reports she was recently admitted approx 1 month ago at [facility] for 2 days for similar presentations but left AMA due to pain from the IV ("I just didn't want to get stuck anymore.") She states she was previously admitted because "I had a bad ultrasound... my thyroid was big." She endorses persistent palpitations, difficulty swallowing secondary to throat pain, increased agitation, foot swelling, intermittent fever, chills, and shortness of breath worse on exertion for the last month. Notes associated decreased P/O intake secondary to throat pain and "bad" diarrhea which began several days ago. No nausea or vomiting. Her current symptoms feel similar to the symptoms prior to her last admission. Of note, reports ongoing goiter which has been chronic for months. She reports family thyroid history, stating her cousin has hyperthyroidism. Denies rash, pain elsewhere, and other somatic complaints.     In the ED, her VS were BP (!) 164/100   Pulse 110   Temp 98.5 °F (36.9 °C) (Oral)   Resp 12   Wt 59 kg (130 lb)   LMP 04/26/2022   SpO2 96%   BMI 23.78 kg/m²     In the ED, she was treated with   Medications   vancomycin - pharmacy to dose (has no administration in time range)   vancomycin 1.5 g in dextrose 5 % 250 mL IVPB (ready to mix) (1,500 mg Intravenous New Bag 3/4/23 0018)   vancomycin (VANCOCIN) 1,000 mg in " dextrose 5 % (D5W) 250 mL IVPB (Vial-Mate) (1,000 mg Intravenous Trough Due As Scheduled Before Dose 3/5/23 1130)   hydrocortisone sodium succinate injection 100 mg (has no administration in time range)   propranoloL tablet 60 mg (has no administration in time range)   acetaminophen tablet 1,000 mg (1,000 mg Oral Given 3/3/23 2126)   sodium chloride 0.9% bolus 1,770 mL 1,770 mL (0 mLs Intravenous Stopped 3/4/23 0028)   piperacillin-tazobactam (ZOSYN) 4.5 g in dextrose 5 % in water (D5W) 5 % 100 mL IVPB (MB+) (0 g Intravenous Stopped 3/4/23 0028)   ondansetron injection 8 mg (8 mg Intravenous Given 3/3/23 2303)   iohexoL (OMNIPAQUE 350) injection 70 mL (70 mLs Intravenous Given 3/3/23 2340)   propranoloL injection 1 mg (1 mg Intravenous Given 3/4/23 0050)   propylthiouraciL tablet 400 mg (400 mg Oral Given 3/4/23 0050)             Past Medical History:   Diagnosis Date    Migraines        No past surgical history on file.    Review of patient's allergies indicates:   Allergen Reactions    Ibuprofen Hives and Edema       No current facility-administered medications on file prior to encounter.     Current Outpatient Medications on File Prior to Encounter   Medication Sig    acetaminophen (TYLENOL EXTRA STRENGTH) 500 MG tablet Take 2 tablets (1,000 mg total) by mouth every 8 (eight) hours as needed for Pain.    atenoloL (TENORMIN) 25 MG tablet Take 1 tablet (25 mg total) by mouth once daily.    ibuprofen (ADVIL,MOTRIN) 600 MG tablet Take 1 tablet (600 mg total) by mouth every 6 (six) hours as needed for Pain.    ondansetron (ZOFRAN-ODT) 4 MG TbDL Take 1 tablet (4 mg total) by mouth every 8 (eight) hours as needed (nausea and vomiting).    [DISCONTINUED] metoprolol succinate (TOPROL-XL) 25 MG 24 hr tablet Take 1 tablet (25 mg total) by mouth once daily.     Family History    None       Tobacco Use    Smoking status: Every Day     Packs/day: 2.00     Types: Cigarettes    Smokeless tobacco: Never    Tobacco  comments:     1 pack of cigarettes will last the pt 2 days.   Substance and Sexual Activity    Alcohol use: Yes     Comment: two drinks every 3 days.    Drug use: Yes     Types: Marijuana     Comment: uses marijuasa daily     Sexual activity: Yes     Partners: Male     Review of Systems   Constitutional:  Positive for chills and fever.   HENT:  Positive for sore throat and trouble swallowing. Negative for congestion.    Respiratory:  Positive for shortness of breath. Negative for cough.    Cardiovascular:  Positive for palpitations and leg swelling. Negative for chest pain.   Gastrointestinal:  Positive for abdominal pain and diarrhea. Negative for blood in stool, constipation, nausea and vomiting.   Neurological:  Positive for headaches. Negative for dizziness and weakness.   Psychiatric/Behavioral:  Positive for agitation. Negative for confusion. The patient is not nervous/anxious.    Objective:     Vital Signs (Most Recent):  Temp: 98.5 °F (36.9 °C) (03/04/23 0022)  Pulse: (!) 114 (03/04/23 0022)  Resp: 16 (03/04/23 0022)  BP: (!) 141/78 (03/04/23 0050)  SpO2: 99 % (03/04/23 0022)   Vital Signs (24h Range):  Temp:  [98.5 °F (36.9 °C)-103.1 °F (39.5 °C)] 98.5 °F (36.9 °C)  Pulse:  [113-128] 114  Resp:  [16-32] 16  SpO2:  [97 %-99 %] 99 %  BP: (137-161)/(70-87) 141/78     Weight: 59 kg (130 lb)  Body mass index is 23.78 kg/m².    Physical Exam  Vitals and nursing note reviewed.   Constitutional:       General: She is not in acute distress.     Appearance: She is well-developed. She is not diaphoretic.      Comments: Uncomfortable appearing. Thin female.   HENT:      Head: Normocephalic and atraumatic.      Mouth/Throat:      Pharynx: No oropharyngeal exudate.   Eyes:      General: No scleral icterus.        Right eye: No discharge.         Left eye: No discharge.      Extraocular Movements: EOM normal.      Conjunctiva/sclera: Conjunctivae normal.      Pupils: Pupils are equal, round, and reactive to light.    Neck:      Thyroid: No thyromegaly.      Vascular: No JVD.      Trachea: No tracheal deviation.   Cardiovascular:      Rate and Rhythm: Regular rhythm. Tachycardia present.      Heart sounds: Normal heart sounds. No murmur heard.    No friction rub. No gallop.   Pulmonary:      Effort: Pulmonary effort is normal. No respiratory distress.      Breath sounds: Normal breath sounds. No stridor. No wheezing or rales.   Chest:      Chest wall: No tenderness.   Abdominal:      General: Bowel sounds are normal. There is no distension.      Palpations: Abdomen is soft. There is no mass.      Tenderness: There is no guarding or rebound.      Comments: Mild abdominal discomfort.   Musculoskeletal:         General: No tenderness. Normal range of motion.      Cervical back: Normal range of motion and neck supple.   Lymphadenopathy:      Cervical: No cervical adenopathy.   Skin:     General: Skin is warm and dry.      Coloration: Skin is not pale.      Findings: No erythema or rash.      Comments: Multiple tattoos.   Neurological:      Mental Status: She is alert and oriented to person, place, and time.      Cranial Nerves: No cranial nerve deficit.      Motor: No abnormal muscle tone.      Coordination: Coordination normal.      Deep Tendon Reflexes: Reflexes are normal and symmetric. Reflexes normal.   Psychiatric:         Behavior: Behavior normal.         Thought Content: Thought content normal.         Judgment: Judgment normal.      Comments: Slightly agitated.         CRANIAL NERVES     CN III, IV, VI   Pupils are equal, round, and reactive to light.  Extraocular motions are normal.      Significant Labs: All pertinent labs within the past 24 hours have been reviewed.  Recent Results (from the past 24 hour(s))   POCT Strep A, Molecular    Collection Time: 03/03/23  9:39 PM   Result Value Ref Range    Molecular Strep A, POC Negative Negative     Acceptable Yes    CBC auto differential    Collection Time:  03/03/23 10:03 PM   Result Value Ref Range    WBC 10.46 3.90 - 12.70 K/uL    RBC 5.19 4.00 - 5.40 M/uL    Hemoglobin 12.1 12.0 - 16.0 g/dL    Hematocrit 36.3 (L) 37.0 - 48.5 %    MCV 70 (L) 82 - 98 fL    MCH 23.3 (L) 27.0 - 31.0 pg    MCHC 33.3 32.0 - 36.0 g/dL    RDW 14.0 11.5 - 14.5 %    Platelets 136 (L) 150 - 450 K/uL    MPV 10.8 9.2 - 12.9 fL    Immature Granulocytes 0.3 0.0 - 0.5 %    Gran # (ANC) 8.1 (H) 1.8 - 7.7 K/uL    Immature Grans (Abs) 0.03 0.00 - 0.04 K/uL    Lymph # 1.1 1.0 - 4.8 K/uL    Mono # 1.2 (H) 0.3 - 1.0 K/uL    Eos # 0.0 0.0 - 0.5 K/uL    Baso # 0.02 0.00 - 0.20 K/uL    nRBC 0 0 /100 WBC    Gran % 77.5 (H) 38.0 - 73.0 %    Lymph % 10.4 (L) 18.0 - 48.0 %    Mono % 11.6 4.0 - 15.0 %    Eosinophil % 0.0 0.0 - 8.0 %    Basophil % 0.2 0.0 - 1.9 %    Differential Method Automated    Comprehensive metabolic panel    Collection Time: 03/03/23 10:03 PM   Result Value Ref Range    Sodium 135 (L) 136 - 145 mmol/L    Potassium 3.2 (L) 3.5 - 5.1 mmol/L    Chloride 95 95 - 110 mmol/L    CO2 25 23 - 29 mmol/L    Glucose 97 70 - 110 mg/dL    BUN 6 6 - 20 mg/dL    Creatinine 0.5 0.5 - 1.4 mg/dL    Calcium 8.9 8.7 - 10.5 mg/dL    Total Protein 7.2 6.0 - 8.4 g/dL    Albumin 3.4 (L) 3.5 - 5.2 g/dL    Total Bilirubin 1.9 (H) 0.1 - 1.0 mg/dL    Alkaline Phosphatase 302 (H) 55 - 135 U/L    AST 39 10 - 40 U/L    ALT 30 10 - 44 U/L    Anion Gap 15 8 - 16 mmol/L    eGFR >60 >60 mL/min/1.73 m^2   TSH    Collection Time: 03/03/23 10:03 PM   Result Value Ref Range    TSH <0.010 (L) 0.400 - 4.000 uIU/mL   Lactic acid, plasma #1    Collection Time: 03/03/23 10:03 PM   Result Value Ref Range    Lactate (Lactic Acid) 1.2 0.5 - 2.2 mmol/L   Procalcitonin    Collection Time: 03/03/23 10:03 PM   Result Value Ref Range    Procalcitonin 0.11 <0.25 ng/mL   Lipase    Collection Time: 03/03/23 10:03 PM   Result Value Ref Range    Lipase 51 4 - 60 U/L   T4, Free    Collection Time: 03/03/23 10:03 PM   Result Value Ref Range    Free  T4 >5.00 (H) 0.71 - 1.51 ng/dL   POCT Influenza A/B Molecular    Collection Time: 03/03/23 10:05 PM   Result Value Ref Range    POC Molecular Influenza A Ag Negative Negative, Not Reported    POC Molecular Influenza B Ag Negative Negative, Not Reported     Acceptable Yes    POCT COVID-19 Rapid Screening    Collection Time: 03/03/23 10:05 PM   Result Value Ref Range    POC Rapid COVID Negative Negative     Acceptable Yes    Urinalysis, Reflex to Urine Culture Urine, Clean Catch    Collection Time: 03/03/23 11:16 PM    Specimen: Urine   Result Value Ref Range    Specimen UA Urine, Clean Catch     Color, UA Yellow Yellow, Straw, Aide    Appearance, UA Clear Clear    pH, UA 7.0 5.0 - 8.0    Specific Gravity, UA 1.005 1.005 - 1.030    Protein, UA Negative Negative    Glucose, UA Negative Negative    Ketones, UA Negative Negative    Bilirubin (UA) Negative Negative    Occult Blood UA Trace (A) Negative    Nitrite, UA Negative Negative    Urobilinogen, UA Negative <2.0 EU/dL    Leukocytes, UA Negative Negative   POCT urine pregnancy    Collection Time: 03/03/23 11:21 PM   Result Value Ref Range    POC Preg Test, Ur Negative Negative     Acceptable Yes    H. pylori antigen, stool    Collection Time: 03/04/23 12:07 AM   Result Value Ref Range    H. pylori Antigen Source stool    Occult blood x 1, stool    Collection Time: 03/04/23 12:07 AM    Specimen: Stool   Result Value Ref Range    Occult Blood Positive (A) Negative   Drug screen panel, in-house    Collection Time: 03/04/23 12:59 AM   Result Value Ref Range    Benzodiazepines Negative Negative    Methadone metabolites Negative Negative    Cocaine (Metab.) Negative Negative    Opiate Scrn, Ur Negative Negative    Barbiturate Screen, Ur Negative Negative    Amphetamine Screen, Ur Negative Negative    THC Presumptive Positive (A) Negative    Phencyclidine Negative Negative    Creatinine, Urine 20.9 15.0 - 325.0 mg/dL     Toxicology Information SEE COMMENT        Significant Imaging: I have reviewed all pertinent imaging results/findings within the past 24 hours.    CT OF ABDOMEN PELVIS WITH CONTRAST  Impression:     No acute abdominal or pelvic pathology CT of the abdomen and pelvis with contrast.     Mild cardiomegaly.        Electronically signed by: Nalini Benitez  Date:                                            03/04/2023  Time:                                           00:06    XR CHEST AP PORTABLE  Impression:     No acute findings.        Electronically signed by: Facundo De Jesus  Date:                                            03/03/2023  Time:                                           22:37    EKG: sinus tach 130, LVH        Assessment/Plan:     * Thyrotoxicosis with thyrotoxic crisis  She was admitted to [facility] with the same 1 month ago and left AMA   -No new precipitating event such as infection, etc., noted    Case was discussed with Endocrinology on call  They recommended the following interventions:   1) PTU loading dose 400mg, then 200mg Q4 hours   2) Propranolol 60-80mg po Q6 hours   3) Hydrocortisone 100mg iv q8 hours   4) SSKI drops, 3-4 drops po 3-4 times per day    -This is not in Epic, so I called Pharmacy who is manually looking for the medication now    Monitor for 1st 24 hours in the MICU  US of thyroid  TSH, FT4, T3, thyrotropin receptor antibody  Consult Dr. Marilyn Powell with Endocrinology  She is agitated, crying and pulling of her tele -> Ativan 0.5mg iv x 1  Check CPK             Primary hypertension  Monitor on Propranolol      Cardiomegaly  Check TTE and BNP  Mild and noted on CXR        Hypokalemia  Replacing with aggressive IVF    VTE Risk Mitigation (From admission, onward)         Ordered     enoxaparin injection 40 mg  Daily         03/04/23 0136     IP VTE HIGH RISK PATIENT  Once         03/04/23 0136     Place sequential compression device  Until discontinued         03/04/23 0136      Place EVE hose  Until discontinued         03/04/23 0136               I, Jess Morton, scribed for, and in the presence of, CALLY Chappell MD. I performed the scribed service and the documentation accurately describes the services I performed. I attest to the accuracy of the note.     I, CALLY Chappell MD, personally performed the services described in this documentation. All medical record entries made by the scribe were at my direction and in my presence. I have reviewed the chart and agree that the record reflects my personal performance and is accurate and complete.    Critical care time spent on the evaluation and treatment of severe organ dysfunction, review of pertinent labs and imaging studies, discussions with consulting providers and discussions with patient/family: 70 minutes.    ROSEMARIE Chappell MD  Department of Hospital Medicine   [facility] - Emergency Dept

## 2023-03-04 NOTE — ED TRIAGE NOTES
Pt c/o sore throat, diarrhea, fever, and SOB. Pt restless and diaphoretic. Hx of thyroid problems but could not explain further.

## 2023-03-04 NOTE — HPI
"Ms Caro is a 44 yo female with a past medical history of migraine headaches and HTN.    Pt reports she was recently admitted approx 1 month ago at [facility] for 2 days for similar presentations but left AMA due to pain from the IV ("I just didn't want to get stuck anymore.") She states she was previously admitted because "I had a bad ultrasound... my thyroid was big." She endorses persistent palpitations, difficulty swallowing secondary to throat pain, increased agitation, foot swelling, intermittent fever, chills, and shortness of breath worse on exertion for the last month. Notes associated decreased P/O intake secondary to throat pain and "bad" diarrhea which began several days ago. No nausea or vomiting. Her current symptoms feel similar to the symptoms prior to her last admission. Of note, reports ongoing goiter which has been chronic for months. She reports family thyroid history, stating her cousin has hyperthyroidism. Denies rash, pain elsewhere, and other somatic complaints.     In the ED, her VS were BP (!) 164/100   Pulse 110   Temp 98.5 °F (36.9 °C) (Oral)   Resp 12   Wt 59 kg (130 lb)   LMP 04/26/2022   SpO2 96%   BMI 23.78 kg/m²     In the ED, she was treated with   Medications   vancomycin - pharmacy to dose (has no administration in time range)   vancomycin 1.5 g in dextrose 5 % 250 mL IVPB (ready to mix) (1,500 mg Intravenous New Bag 3/4/23 0018)   vancomycin (VANCOCIN) 1,000 mg in dextrose 5 % (D5W) 250 mL IVPB (Vial-Mate) (1,000 mg Intravenous Trough Due As Scheduled Before Dose 3/5/23 1130)   hydrocortisone sodium succinate injection 100 mg (has no administration in time range)   propranoloL tablet 60 mg (has no administration in time range)   acetaminophen tablet 1,000 mg (1,000 mg Oral Given 3/3/23 2126)   sodium chloride 0.9% bolus 1,770 mL 1,770 mL (0 mLs Intravenous Stopped 3/4/23 0028)   piperacillin-tazobactam (ZOSYN) 4.5 g in dextrose 5 % in water (D5W) 5 % 100 mL IVPB (MB+) (0 g " Intravenous Stopped 3/4/23 0028)   ondansetron injection 8 mg (8 mg Intravenous Given 3/3/23 2303)   iohexoL (OMNIPAQUE 350) injection 70 mL (70 mLs Intravenous Given 3/3/23 2340)   propranoloL injection 1 mg (1 mg Intravenous Given 3/4/23 0050)   propylthiouraciL tablet 400 mg (400 mg Oral Given 3/4/23 0050)

## 2023-03-04 NOTE — PLAN OF CARE
Patient remains in ICU on room air pending transfer orders to floor. VSS. AAOx4 and afebrile. BM x 3 per bedside commode, loose, liquid, PRN Imodium x1. PRN oxycodone x1 for stomach pain. PRN Zofran x1 for nausea. Patient updated on plan of care. Free of injuries, falls, and any skin breakdown on this shift.

## 2023-03-04 NOTE — EICU
Brief Note     43 yr old female   Thyroid storm      stat , then 200   SSKI to be given this am   Hydrocortisone 100 mg q 8  Propranolol   HR 96   /84   Sats 98    Conitnue to observe   AM labs to be drawn soon   Follow  K

## 2023-03-04 NOTE — ASSESSMENT & PLAN NOTE
She was admitted to [facility] with the same 1 month ago and left AMA   -No new precipitating event such as infection, etc., noted    Case was discussed with Endocrinology on call  They recommended the following interventions:   1) PTU loading dose 400mg, then 200mg Q4 hours   2) Propranolol 60-80mg po Q6 hours   3) Hydrocortisone 100mg iv q8 hours   4) SSKI drops, 3-4 drops po 3-4 times per day    -This is not in Epic, so I called Pharmacy who is manually looking for the medication now    Monitor for 1st 24 hours in the MICU  US of thyroid  TSH, FT4, T3, thyrotropin receptor antibody  Consult Dr. Marilyn Powell with Endocrinology  She is agitated, crying and pulling of her tele -> Ativan 0.5mg iv x 1  Check CPK

## 2023-03-04 NOTE — NURSING
[facility]  ICU Shift Summary  Date: 3/4/2023      Prehospitalization: Home  Admit Date / LOS : 3/3/2023/ 0 days    Diagnosis: Thyrotoxicosis with thyrotoxic crisis    Consults:        Active: Endocrinology       Needed: N/A     Code Status: Full Code   Advanced Directive: <no information>    LDA:  Lines/Drains/Airways       Peripheral Intravenous Line  Duration                  Peripheral IV - Single Lumen 03/03/23 2212 20 G Left Antecubital <1 day                  Central Lines/Site/Justification:Patient Does Not Have Central Line  Urinary Cath/Order/Justification:Patient Does Not Have Urinary Catheter    Vasopressors/Infusions:    0/9% NACL & POTASSIUM CHLORIDE 20 MEQ/L 100 mL/hr at 03/04/23 0227          GOALS: Volume/ Hemodynamic: N/A                     RASS: N/A    Pain Management: PO       Pain Controlled: yes     Rhythm: NSR    Respiratory Device: Room Air                  Most Recent SBT/ SAT: Does not meet criteria       MOVE Screen: PASS  ICU Liberation: not applicable    VTE Prophylaxis: Pharm  Mobility: A: Assist  Stress Ulcer Prophylaxis: No    Isolation: Special Contact    Dietary:   Current Diet Order   Procedures    Diet Adult Regular (IDDSI Level 7)      Tolerance: yes  Advancement: @ goal    I & O (24h):    Intake/Output Summary (Last 24 hours) at 3/4/2023 0452  Last data filed at 3/4/2023 0400  Gross per 24 hour   Intake 2240 ml   Output --   Net 2240 ml        Restraints: No    Significant Dates:  Post Op Date: N/A  Rescue Date: N/A  Imaging/ Diagnostics: N/A    Noteworthy Labs:  none    COVID Test: (--)  CBC/Anemia Labs: Coags:    Recent Labs   Lab 03/03/23 2203 03/04/23  0007 03/04/23  0357   WBC 10.46  --  8.12   HGB 12.1  --  11.9*   HCT 36.3*  --  37.1   *  --  131*   MCV 70*  --  72*   RDW 14.0  --  14.4   OCCULTBLOOD  --  Positive*  --     No results for input(s): PT, INR, APTT in the last 168 hours.     Chemistries:   Recent Labs   Lab 03/03/23 2203   *    K 3.2*   CL 95   CO2 25   BUN 6   CREATININE 0.5   CALCIUM 8.9   PROT 7.2   BILITOT 1.9*   ALKPHOS 302*   ALT 30   AST 39        Cardiac Enzymes: Ejection Fractions:    No results for input(s): CPK, CPKMB, MB, TROPONINI in the last 72 hours. No results found for: EF     POCT Glucose: HbA1c:    No results for input(s): POCTGLUCOSE in the last 168 hours. No results found for: HGBA1C        ICU LOS 1h  Level of Care: Critical Care    Chart Check: 12 HR Done  Shift Summary/Plan for the shift: none

## 2023-03-04 NOTE — ED NOTES
MD notified that patient not tolerating monitoring leads.  MD ordered lorazepam.  Call report attempted.  Anne to return my call.

## 2023-03-04 NOTE — HOSPITAL COURSE
43-year-old female with history of hypertension admitted to ICU on 03/04/2023 with thyrotoxic crisis. TSH <0.010 and free T4 >5.0. US thyroid shows thyroid appears enlarged and heterogeneous with increased vascularity and positive for lymph nodes. CT abd showed no acute abdominal or pelvic pathology. Echo w/EF 55% and pHTN. Endocrinology at [facility] consulted. Patient monitored overnight in ICU and stepped down on 03/04.  Patient more hemodynamically stable and planned to transfer out of ICU.  She then became more confused, tangential and pacing the room crying uncontrollably.  Patient apparently expressed thoughts that she wanted to die to nurse.  She was PEC and Psych consulted.  Per Tele Psych, patient is not at imminent danger to self, or to others, and is not gravely disabled, as a result of mental illness and ok to rescind PEC.  Delirium episode now resolved. Tapering medications per endocrinology recs. Can discharge 3/9/23 once taper complete.     Patient demanded to leave today. Refused labs. Able to convince to get labs, will give meds at d/c. She will need external Endocrine referral as she has medicaid.

## 2023-03-04 NOTE — CARE UPDATE
[facility]  Nurses Note -- 4 Eyes      3/4/2023       Skin assessed on: Saturday      [x] No Pressure Injuries Present    [x]Prevention Measures Documented    [] Yes LDA  for Pressure Injury Previously documented     [] Yes New Pressure Injury Discovered   [] LDA for New Pressure Injury Added      Attending RN:  Kellie Miller, RN     Second RN:  Jaylan Reyna, Student RN

## 2023-03-04 NOTE — ED PROVIDER NOTES
Encounter Date: 3/3/2023       History     Chief Complaint   Patient presents with    Sore Throat     Today with shortness     43 y.o. female Past Medical History:  No date: Migraines     Endorses headache swelling/pain to throat (thyroid), malaise starting today. Denies n/v +diarrhea, noted to have temp 103.1 on arrival.    Pt minimally cooperative during H and P        Review of patient's allergies indicates:   Allergen Reactions    Ibuprofen Hives and Edema     Past Medical History:   Diagnosis Date    Migraines      No past surgical history on file.  No family history on file.  Social History     Tobacco Use    Smoking status: Every Day     Packs/day: 2.00     Types: Cigarettes    Smokeless tobacco: Never    Tobacco comments:     1 pack of cigarettes will last the pt 2 days.   Substance Use Topics    Alcohol use: Yes     Comment: two drinks every 3 days.    Drug use: Yes     Types: Marijuana     Comment: uses marijuasa daily      Review of Systems   Constitutional: Negative.    HENT: Negative.  Negative for congestion.    Eyes: Negative.    Respiratory: Negative.  Negative for cough.    Cardiovascular: Negative.    Gastrointestinal: Negative.  Negative for abdominal pain, diarrhea, nausea and vomiting.   Endocrine: Negative.    Genitourinary: Negative.    Musculoskeletal: Negative.    Skin: Negative.    Allergic/Immunologic: Negative.    Hematological: Negative.    Psychiatric/Behavioral: Negative.     All other systems reviewed and are negative.    Physical Exam     Initial Vitals [03/03/23 2119]   BP Pulse Resp Temp SpO2   (!) 160/87 (!) 128 (!) 22 (!) 103.1 °F (39.5 °C) 97 %      MAP       --         Physical Exam    Nursing note and vitals reviewed.  Constitutional: She appears well-developed and well-nourished.   HENT:   Head: Normocephalic and atraumatic.   Eyes: Conjunctivae and EOM are normal. Pupils are equal, round, and reactive to light.   Neck:   Normal range of motion.  Cardiovascular:             tachy   Pulmonary/Chest: Breath sounds normal. No respiratory distress.   Abdominal: Abdomen is soft. She exhibits no distension. There is no abdominal tenderness. There is no rebound and no guarding.   Musculoskeletal:         General: Normal range of motion.      Cervical back: Normal range of motion.     Neurological: She is alert. No cranial nerve deficit. GCS score is 15. GCS eye subscore is 4. GCS verbal subscore is 5. GCS motor subscore is 6.   Skin: Skin is warm and dry.   Psychiatric: She has a normal mood and affect. Thought content normal.   Enlarged tender thyroid  Oropharynx clear, post pharynx clear  ED Course   Procedures  MEDICAL DECISION MAKING    After review of the patient's physical exam, ED testing, and history/symptoms, relevant labs, imaging, available outside records  a wide differential was considered including but not limited to: infectious, traumatic, vascular, toxicological , metabolic, malignant, ischemic, embolic, psychological, genetic, iatrogenic, idiopathic, medication reaction, substance dependence/intoxication/withdrawal, electrolyte or blood dyscrasia, and other etiologies.        labs/imaging/interventions include:       Medications   vancomycin - pharmacy to dose (has no administration in time range)   vancomycin 1.5 g in dextrose 5 % 250 mL IVPB (ready to mix) (1,500 mg Intravenous New Bag 3/4/23 0018)   vancomycin (VANCOCIN) 1,000 mg in dextrose 5 % (D5W) 250 mL IVPB (Vial-Mate) (1,000 mg Intravenous Trough Due As Scheduled Before Dose 3/5/23 1130)   hydrocortisone sodium succinate injection 100 mg (has no administration in time range)   propranoloL tablet 60 mg (has no administration in time range)   acetaminophen tablet 1,000 mg (1,000 mg Oral Given 3/3/23 2126)   sodium chloride 0.9% bolus 1,770 mL 1,770 mL (0 mLs Intravenous Stopped 3/4/23 0028)   piperacillin-tazobactam (ZOSYN) 4.5 g in dextrose 5 % in water (D5W) 5 % 100 mL IVPB (MB+) (0 g Intravenous Stopped 3/4/23  0028)   ondansetron injection 8 mg (8 mg Intravenous Given 3/3/23 2303)   iohexoL (OMNIPAQUE 350) injection 70 mL (70 mLs Intravenous Given 3/3/23 2340)   propranoloL injection 1 mg (1 mg Intravenous Given 3/4/23 0050)   propylthiouraciL tablet 400 mg (400 mg Oral Given 3/4/23 0050)     Labs Reviewed   CBC W/ AUTO DIFFERENTIAL - Abnormal; Notable for the following components:       Result Value    Hematocrit 36.3 (*)     MCV 70 (*)     MCH 23.3 (*)     Platelets 136 (*)     Gran # (ANC) 8.1 (*)     Mono # 1.2 (*)     Gran % 77.5 (*)     Lymph % 10.4 (*)     All other components within normal limits   COMPREHENSIVE METABOLIC PANEL - Abnormal; Notable for the following components:    Sodium 135 (*)     Potassium 3.2 (*)     Albumin 3.4 (*)     Total Bilirubin 1.9 (*)     Alkaline Phosphatase 302 (*)     All other components within normal limits   TSH - Abnormal; Notable for the following components:    TSH <0.010 (*)     All other components within normal limits   URINALYSIS, REFLEX TO URINE CULTURE - Abnormal; Notable for the following components:    Occult Blood UA Trace (*)     All other components within normal limits    Narrative:     Specimen Source->Urine   OCCULT BLOOD X 1, STOOL - Abnormal; Notable for the following components:    Occult Blood Positive (*)     All other components within normal limits   T4, FREE - Abnormal; Notable for the following components:    Free T4 >5.00 (*)     All other components within normal limits   CULTURE, BLOOD   CULTURE, BLOOD   CLOSTRIDIUM DIFFICILE   CULTURE, STOOL   ENTEROHEMORRHAGIC E.COLI   LACTIC ACID, PLASMA   PROCALCITONIN   LIPASE   H. PYLORI ANTIGEN, STOOL   LIPASE   T3   PANCREATIC ELASTASE, FECAL   FECAL FAT, QUALITATIVE   WBC, STOOL   ROTAVIRUS ANTIGEN, STOOL   CALPROTECTIN, STOOL   GIARDIA/CRYPTOSPORIDIUM (EIA)   STOOL EXAM-OVA,CYSTS,PARASITES   DRUG SCREEN PANEL, URINE EMERGENCY   T3   DRUG SCREEN PANEL, URINE EMERGENCY   POCT URINE PREGNANCY   POCT STREP A  MOLECULAR   SARS-COV-2 RDRP GENE   POCT INFLUENZA A/B MOLECULAR      CT Abdomen Pelvis With Contrast   Final Result      No acute abdominal or pelvic pathology CT of the abdomen and pelvis with contrast.      Mild cardiomegaly.         Electronically signed by: Nalini Benitez   Date:    03/04/2023   Time:    00:06      X-Ray Chest AP Portable   Final Result      No acute findings.         Electronically signed by: Facundo De Jesus   Date:    03/03/2023   Time:    22:37                Labs Reviewed   CBC W/ AUTO DIFFERENTIAL - Abnormal; Notable for the following components:       Result Value    Hematocrit 36.3 (*)     MCV 70 (*)     MCH 23.3 (*)     Platelets 136 (*)     Gran # (ANC) 8.1 (*)     Mono # 1.2 (*)     Gran % 77.5 (*)     Lymph % 10.4 (*)     All other components within normal limits   COMPREHENSIVE METABOLIC PANEL - Abnormal; Notable for the following components:    Sodium 135 (*)     Potassium 3.2 (*)     Albumin 3.4 (*)     Total Bilirubin 1.9 (*)     Alkaline Phosphatase 302 (*)     All other components within normal limits   TSH - Abnormal; Notable for the following components:    TSH <0.010 (*)     All other components within normal limits   URINALYSIS, REFLEX TO URINE CULTURE - Abnormal; Notable for the following components:    Occult Blood UA Trace (*)     All other components within normal limits    Narrative:     Specimen Source->Urine   OCCULT BLOOD X 1, STOOL - Abnormal; Notable for the following components:    Occult Blood Positive (*)     All other components within normal limits   T4, FREE - Abnormal; Notable for the following components:    Free T4 >5.00 (*)     All other components within normal limits   CULTURE, BLOOD   CULTURE, BLOOD   CLOSTRIDIUM DIFFICILE   CULTURE, STOOL   ENTEROHEMORRHAGIC E.COLI   LACTIC ACID, PLASMA   PROCALCITONIN   LIPASE   H. PYLORI ANTIGEN, STOOL   LIPASE   T3   PANCREATIC ELASTASE, FECAL   FECAL FAT, QUALITATIVE   WBC, STOOL   ROTAVIRUS ANTIGEN, STOOL    CALPROTECTIN, STOOL   GIARDIA/CRYPTOSPORIDIUM (EIA)   STOOL EXAM-OVA,CYSTS,PARASITES   DRUG SCREEN PANEL, URINE EMERGENCY   T3   DRUG SCREEN PANEL, URINE EMERGENCY   POCT URINE PREGNANCY   POCT STREP A MOLECULAR   SARS-COV-2 RDRP GENE   POCT INFLUENZA A/B MOLECULAR     EKG Readings: (Independently Interpreted)   Hr 130, sinus, nl axis/intervals, no tonio/twi, non acute, no stemi     Imaging Results              CT Abdomen Pelvis With Contrast (Final result)  Result time 03/04/23 00:06:19      Final result by Nalini Benitez MD (03/04/23 00:06:19)                   Impression:      No acute abdominal or pelvic pathology CT of the abdomen and pelvis with contrast.    Mild cardiomegaly.      Electronically signed by: Nalini Benitez  Date:    03/04/2023  Time:    00:06               Narrative:    EXAMINATION:  CT OF ABDOMEN PELVIS WITH    CLINICAL HISTORY:  Abdominal abscess/infection suspected;    TECHNIQUE:  5 mm enhanced axial images were obtained from the lung bases through the greater trochanters.  Seventy mL of Omnipaque 350 was injected.    COMPARISON:  05/08/2019    FINDINGS:  The liver, spleen, pancreas, kidneys, and adrenal glands are unremarkable. The gallbladder contains no calcified gallstones.    There is no definite evidence for abdominal adenopathy or ascites.  There is mild prominence of the retroperitoneal lymph nodes.    There are no pelvic masses or adenopathy.  The uterus is retroverted.  The appendix is not inflamed.    There is no free fluid in the pelvis.    At the lung bases, the heart is enlarged.  There is moderate bibasilar atelectasis.                                       X-Ray Chest AP Portable (Final result)  Result time 03/03/23 22:37:06      Final result by Facundo De Jesus MD (03/03/23 22:37:06)                   Impression:      No acute findings.      Electronically signed by: Facundo De Jesus  Date:    03/03/2023  Time:    22:37               Narrative:     "EXAMINATION:  XR CHEST AP PORTABLE    CLINICAL HISTORY:  Sepsis;    TECHNIQUE:  Single frontal view of the chest was performed.    COMPARISON:  08/12/2021    FINDINGS:  Lungs are clear.  No focal consolidation, pleural fluid, or pneumothorax.  Normal heart size.                                       Medications   vancomycin - pharmacy to dose (has no administration in time range)   vancomycin 1.5 g in dextrose 5 % 250 mL IVPB (ready to mix) (1,500 mg Intravenous New Bag 3/4/23 0018)   vancomycin (VANCOCIN) 1,000 mg in dextrose 5 % (D5W) 250 mL IVPB (Vial-Mate) (1,000 mg Intravenous Trough Due As Scheduled Before Dose 3/5/23 1130)   hydrocortisone sodium succinate injection 100 mg (has no administration in time range)   propranoloL tablet 60 mg (has no administration in time range)   acetaminophen tablet 1,000 mg (1,000 mg Oral Given 3/3/23 2126)   sodium chloride 0.9% bolus 1,770 mL 1,770 mL (0 mLs Intravenous Stopped 3/4/23 0028)   piperacillin-tazobactam (ZOSYN) 4.5 g in dextrose 5 % in water (D5W) 5 % 100 mL IVPB (MB+) (0 g Intravenous Stopped 3/4/23 0028)   ondansetron injection 8 mg (8 mg Intravenous Given 3/3/23 2303)   iohexoL (OMNIPAQUE 350) injection 70 mL (70 mLs Intravenous Given 3/3/23 2340)   propranoloL injection 1 mg (1 mg Intravenous Given 3/4/23 0050)   propylthiouraciL tablet 400 mg (400 mg Oral Given 3/4/23 0050)     Medical Decision Making:   Clinical Tests:   Sepsis Perfusion Assessment: "I attest a sepsis perfusion exam was performed within 6 hours of sepsis, severe sepsis, or septic shock presentation, following fluid resuscitation."               Pt here with fever, now c/o abd pain with intractable non bloody diarrhea. Have ordered ct     Garner Wartofsky score of 65 for thyroid toxicosis.     I have d/w Dr. Agarwal with Endocrine at [facility] who recommends:  200mg PTU q4h,     Propanolol 60-80mg PO q6H   hydrocortisone 100mg q8h,   SSKI drops 3-4 drops 3-4x/day    SIRS noted, awaiting " covid/flu/strep    I have independently evaluated and interpreted all available labs and imaging to the extent of the scope of my practice.  The suspected diagnosis, treatment and plan were discussed with the patient. All questions or concerns have been addressed.    Note was created using voice recognition software. Note may have occasional typographical or grammatical errors , garbled syntax, and other bizarre constructions that may not have been identified and edited despite good derik initial review prior to signing.        Clinical Impression:   Final diagnoses:  [R00.0] Tachycardia  [E05.91] Thyrotoxicosis with thyrotoxic crisis, unspecified thyrotoxicosis type (Primary)  [R65.10] SIRS (systemic inflammatory response syndrome)        ED Disposition Condition    Admit Stable                Maria Ines Chang MD  03/04/23 1709       Maria Ines Chang MD  03/04/23 1780

## 2023-03-04 NOTE — NURSING
[facility], [facility]  Nurses Note -- 4 Eyes      3/4/2023       Skin assessed on: Admit      [x] No Pressure Injuries Present    [x]Prevention Measures Documented    [] Yes LDA  for Pressure Injury Previously documented     [] Yes New Pressure Injury Discovered   [] LDA for New Pressure Injury Added      Attending RN:  NENA OSMAN RN     Second RN:  TIM Oviedo

## 2023-03-04 NOTE — PROGRESS NOTES
Pharmacokinetic Initial Assessment: IV Vancomycin    Assessment/Plan:    Initiate intravenous vancomycin with loading dose of 1500 mg once followed by a maintenance dose of vancomycin 1000 mg IV every 12 hours  Desired empiric serum trough concentration is 10 to 20 mcg/mL  Draw vancomycin trough level 60 min prior to fourth dose on 3/5/23 at approximately 1130  Pharmacy will continue to follow and monitor vancomycin.      Please contact pharmacy at extension [phone] with any questions regarding this assessment.     Thank you for the consult,   Ash Mancia       Patient brief summary:  Morgan Caro is a 43 y.o. female initiated on antimicrobial therapy with IV Vancomycin for treatment of suspected bacteremia    Drug Allergies:   Review of patient's allergies indicates:   Allergen Reactions    Ibuprofen Hives and Edema       Actual Body Weight:   59 kg    Renal Function:   Estimated Creatinine Clearance: 112.4 mL/min (based on SCr of 0.5 mg/dL).,     Dialysis Method (if applicable):  N/A    CBC (last 72 hours):  Recent Labs   Lab Result Units 03/03/23 2203   WBC K/uL 10.46   Hemoglobin g/dL 12.1   Hematocrit % 36.3*   Platelets K/uL 136*   Gran % % 77.5*   Lymph % % 10.4*   Mono % % 11.6   Eosinophil % % 0.0   Basophil % % 0.2   Differential Method  Automated       Metabolic Panel (last 72 hours):  Recent Labs   Lab Result Units 03/03/23 2203 03/03/23  2316   Sodium mmol/L 135*  --    Potassium mmol/L 3.2*  --    Chloride mmol/L 95  --    CO2 mmol/L 25  --    Glucose mg/dL 97  --    Glucose, UA   --  Negative   BUN mg/dL 6  --    Creatinine mg/dL 0.5  --    Albumin g/dL 3.4*  --    Total Bilirubin mg/dL 1.9*  --    Alkaline Phosphatase U/L 302*  --    AST U/L 39  --    ALT U/L 30  --        Drug levels (last 3 results):  No results for input(s): VANCOMYCINRA, VANCORANDOM, VANCOMYCINPE, VANCOPEAK, VANCOMYCINTR, VANCOTROUGH in the last 72 hours.    Microbiologic Results:  Microbiology Results (last 7 days)        Procedure Component Value Units Date/Time    E. coli 0157 antigen [[id number]] Collected: 03/04/23 0029    Order Status: No result Specimen: Stool Updated: 03/04/23 0030    Clostridium difficile EIA [[id number]] Collected: 03/04/23 0029    Order Status: Sent Specimen: Stool Updated: 03/04/23 0029    Stool culture [[id number]] Collected: 03/04/23 0029    Order Status: Sent Specimen: Stool Updated: 03/04/23 0029    Blood culture x two cultures. Draw prior to antibiotics. [[id number]] Collected: 03/03/23 2208    Order Status: Sent Specimen: Blood from Peripheral, Antecubital, Left Updated: 03/03/23 2214    Blood culture x two cultures. Draw prior to antibiotics. [[id number]] Collected: 03/03/23 2200    Order Status: Sent Specimen: Blood from Peripheral, Hand, Right Updated: 03/03/23 2214

## 2023-03-04 NOTE — PLAN OF CARE
Attempted assessment, placed call to number listed in chart, left voice message with call back number. CM to follow up.

## 2023-03-04 NOTE — SUBJECTIVE & OBJECTIVE
Past Medical History:   Diagnosis Date    Migraines        No past surgical history on file.    Review of patient's allergies indicates:   Allergen Reactions    Ibuprofen Hives and Edema       No current facility-administered medications on file prior to encounter.     Current Outpatient Medications on File Prior to Encounter   Medication Sig    acetaminophen (TYLENOL EXTRA STRENGTH) 500 MG tablet Take 2 tablets (1,000 mg total) by mouth every 8 (eight) hours as needed for Pain.    atenoloL (TENORMIN) 25 MG tablet Take 1 tablet (25 mg total) by mouth once daily.    ibuprofen (ADVIL,MOTRIN) 600 MG tablet Take 1 tablet (600 mg total) by mouth every 6 (six) hours as needed for Pain.    ondansetron (ZOFRAN-ODT) 4 MG TbDL Take 1 tablet (4 mg total) by mouth every 8 (eight) hours as needed (nausea and vomiting).    [DISCONTINUED] metoprolol succinate (TOPROL-XL) 25 MG 24 hr tablet Take 1 tablet (25 mg total) by mouth once daily.     Family History    None       Tobacco Use    Smoking status: Every Day     Packs/day: 2.00     Types: Cigarettes    Smokeless tobacco: Never    Tobacco comments:     1 pack of cigarettes will last the pt 2 days.   Substance and Sexual Activity    Alcohol use: Yes     Comment: two drinks every 3 days.    Drug use: Yes     Types: Marijuana     Comment: uses marijuasa daily     Sexual activity: Yes     Partners: Male     Review of Systems   Constitutional:  Positive for chills and fever.   HENT:  Positive for sore throat and trouble swallowing. Negative for congestion.    Respiratory:  Positive for shortness of breath. Negative for cough.    Cardiovascular:  Positive for palpitations and leg swelling. Negative for chest pain.   Gastrointestinal:  Positive for abdominal pain and diarrhea. Negative for blood in stool, constipation, nausea and vomiting.   Neurological:  Positive for headaches. Negative for dizziness and weakness.   Psychiatric/Behavioral:  Positive for agitation. Negative for  confusion. The patient is not nervous/anxious.    Objective:     Vital Signs (Most Recent):  Temp: 98.5 °F (36.9 °C) (03/04/23 0022)  Pulse: (!) 114 (03/04/23 0022)  Resp: 16 (03/04/23 0022)  BP: (!) 141/78 (03/04/23 0050)  SpO2: 99 % (03/04/23 0022)   Vital Signs (24h Range):  Temp:  [98.5 °F (36.9 °C)-103.1 °F (39.5 °C)] 98.5 °F (36.9 °C)  Pulse:  [113-128] 114  Resp:  [16-32] 16  SpO2:  [97 %-99 %] 99 %  BP: (137-161)/(70-87) 141/78     Weight: 59 kg (130 lb)  Body mass index is 23.78 kg/m².    Physical Exam  Vitals and nursing note reviewed.   Constitutional:       General: She is not in acute distress.     Appearance: She is well-developed. She is not diaphoretic.      Comments: Uncomfortable appearing. Thin female.   HENT:      Head: Normocephalic and atraumatic.      Mouth/Throat:      Pharynx: No oropharyngeal exudate.   Eyes:      General: No scleral icterus.        Right eye: No discharge.         Left eye: No discharge.      Extraocular Movements: EOM normal.      Conjunctiva/sclera: Conjunctivae normal.      Pupils: Pupils are equal, round, and reactive to light.   Neck:      Thyroid: No thyromegaly.      Vascular: No JVD.      Trachea: No tracheal deviation.   Cardiovascular:      Rate and Rhythm: Regular rhythm. Tachycardia present.      Heart sounds: Normal heart sounds. No murmur heard.    No friction rub. No gallop.   Pulmonary:      Effort: Pulmonary effort is normal. No respiratory distress.      Breath sounds: Normal breath sounds. No stridor. No wheezing or rales.   Chest:      Chest wall: No tenderness.   Abdominal:      General: Bowel sounds are normal. There is no distension.      Palpations: Abdomen is soft. There is no mass.      Tenderness: There is no guarding or rebound.      Comments: Mild abdominal discomfort.   Musculoskeletal:         General: No tenderness. Normal range of motion.      Cervical back: Normal range of motion and neck supple.   Lymphadenopathy:      Cervical: No  cervical adenopathy.   Skin:     General: Skin is warm and dry.      Coloration: Skin is not pale.      Findings: No erythema or rash.      Comments: Multiple tattoos.   Neurological:      Mental Status: She is alert and oriented to person, place, and time.      Cranial Nerves: No cranial nerve deficit.      Motor: No abnormal muscle tone.      Coordination: Coordination normal.      Deep Tendon Reflexes: Reflexes are normal and symmetric. Reflexes normal.   Psychiatric:         Behavior: Behavior normal.         Thought Content: Thought content normal.         Judgment: Judgment normal.      Comments: Slightly agitated.         CRANIAL NERVES     CN III, IV, VI   Pupils are equal, round, and reactive to light.  Extraocular motions are normal.      Significant Labs: All pertinent labs within the past 24 hours have been reviewed.  Recent Results (from the past 24 hour(s))   POCT Strep A, Molecular    Collection Time: 03/03/23  9:39 PM   Result Value Ref Range    Molecular Strep A, POC Negative Negative     Acceptable Yes    CBC auto differential    Collection Time: 03/03/23 10:03 PM   Result Value Ref Range    WBC 10.46 3.90 - 12.70 K/uL    RBC 5.19 4.00 - 5.40 M/uL    Hemoglobin 12.1 12.0 - 16.0 g/dL    Hematocrit 36.3 (L) 37.0 - 48.5 %    MCV 70 (L) 82 - 98 fL    MCH 23.3 (L) 27.0 - 31.0 pg    MCHC 33.3 32.0 - 36.0 g/dL    RDW 14.0 11.5 - 14.5 %    Platelets 136 (L) 150 - 450 K/uL    MPV 10.8 9.2 - 12.9 fL    Immature Granulocytes 0.3 0.0 - 0.5 %    Gran # (ANC) 8.1 (H) 1.8 - 7.7 K/uL    Immature Grans (Abs) 0.03 0.00 - 0.04 K/uL    Lymph # 1.1 1.0 - 4.8 K/uL    Mono # 1.2 (H) 0.3 - 1.0 K/uL    Eos # 0.0 0.0 - 0.5 K/uL    Baso # 0.02 0.00 - 0.20 K/uL    nRBC 0 0 /100 WBC    Gran % 77.5 (H) 38.0 - 73.0 %    Lymph % 10.4 (L) 18.0 - 48.0 %    Mono % 11.6 4.0 - 15.0 %    Eosinophil % 0.0 0.0 - 8.0 %    Basophil % 0.2 0.0 - 1.9 %    Differential Method Automated    Comprehensive metabolic panel     Collection Time: 03/03/23 10:03 PM   Result Value Ref Range    Sodium 135 (L) 136 - 145 mmol/L    Potassium 3.2 (L) 3.5 - 5.1 mmol/L    Chloride 95 95 - 110 mmol/L    CO2 25 23 - 29 mmol/L    Glucose 97 70 - 110 mg/dL    BUN 6 6 - 20 mg/dL    Creatinine 0.5 0.5 - 1.4 mg/dL    Calcium 8.9 8.7 - 10.5 mg/dL    Total Protein 7.2 6.0 - 8.4 g/dL    Albumin 3.4 (L) 3.5 - 5.2 g/dL    Total Bilirubin 1.9 (H) 0.1 - 1.0 mg/dL    Alkaline Phosphatase 302 (H) 55 - 135 U/L    AST 39 10 - 40 U/L    ALT 30 10 - 44 U/L    Anion Gap 15 8 - 16 mmol/L    eGFR >60 >60 mL/min/1.73 m^2   TSH    Collection Time: 03/03/23 10:03 PM   Result Value Ref Range    TSH <0.010 (L) 0.400 - 4.000 uIU/mL   Lactic acid, plasma #1    Collection Time: 03/03/23 10:03 PM   Result Value Ref Range    Lactate (Lactic Acid) 1.2 0.5 - 2.2 mmol/L   Procalcitonin    Collection Time: 03/03/23 10:03 PM   Result Value Ref Range    Procalcitonin 0.11 <0.25 ng/mL   Lipase    Collection Time: 03/03/23 10:03 PM   Result Value Ref Range    Lipase 51 4 - 60 U/L   T4, Free    Collection Time: 03/03/23 10:03 PM   Result Value Ref Range    Free T4 >5.00 (H) 0.71 - 1.51 ng/dL   POCT Influenza A/B Molecular    Collection Time: 03/03/23 10:05 PM   Result Value Ref Range    POC Molecular Influenza A Ag Negative Negative, Not Reported    POC Molecular Influenza B Ag Negative Negative, Not Reported     Acceptable Yes    POCT COVID-19 Rapid Screening    Collection Time: 03/03/23 10:05 PM   Result Value Ref Range    POC Rapid COVID Negative Negative     Acceptable Yes    Urinalysis, Reflex to Urine Culture Urine, Clean Catch    Collection Time: 03/03/23 11:16 PM    Specimen: Urine   Result Value Ref Range    Specimen UA Urine, Clean Catch     Color, UA Yellow Yellow, Straw, Aide    Appearance, UA Clear Clear    pH, UA 7.0 5.0 - 8.0    Specific Gravity, UA 1.005 1.005 - 1.030    Protein, UA Negative Negative    Glucose, UA Negative Negative    Ketones,  UA Negative Negative    Bilirubin (UA) Negative Negative    Occult Blood UA Trace (A) Negative    Nitrite, UA Negative Negative    Urobilinogen, UA Negative <2.0 EU/dL    Leukocytes, UA Negative Negative   POCT urine pregnancy    Collection Time: 03/03/23 11:21 PM   Result Value Ref Range    POC Preg Test, Ur Negative Negative     Acceptable Yes    H. pylori antigen, stool    Collection Time: 03/04/23 12:07 AM   Result Value Ref Range    H. pylori Antigen Source stool    Occult blood x 1, stool    Collection Time: 03/04/23 12:07 AM    Specimen: Stool   Result Value Ref Range    Occult Blood Positive (A) Negative   Drug screen panel, in-house    Collection Time: 03/04/23 12:59 AM   Result Value Ref Range    Benzodiazepines Negative Negative    Methadone metabolites Negative Negative    Cocaine (Metab.) Negative Negative    Opiate Scrn, Ur Negative Negative    Barbiturate Screen, Ur Negative Negative    Amphetamine Screen, Ur Negative Negative    THC Presumptive Positive (A) Negative    Phencyclidine Negative Negative    Creatinine, Urine 20.9 15.0 - 325.0 mg/dL    Toxicology Information SEE COMMENT        Significant Imaging: I have reviewed all pertinent imaging results/findings within the past 24 hours.    CT OF ABDOMEN PELVIS WITH CONTRAST  Impression:     No acute abdominal or pelvic pathology CT of the abdomen and pelvis with contrast.     Mild cardiomegaly.        Electronically signed by: Nalini Benitez  Date:                                            03/04/2023  Time:                                           00:06    XR CHEST AP PORTABLE  Impression:     No acute findings.        Electronically signed by: Facundo De Jesus  Date:                                            03/03/2023  Time:                                           22:37    EKG: sinus tach 130, LVH

## 2023-03-05 PROBLEM — R41.0 ACUTE DELIRIUM: Status: ACTIVE | Noted: 2023-03-05

## 2023-03-05 LAB
ALBUMIN SERPL BCP-MCNC: 2.8 G/DL (ref 3.5–5.2)
ALP SERPL-CCNC: 240 U/L (ref 55–135)
ALT SERPL W/O P-5'-P-CCNC: 19 U/L (ref 10–44)
ANION GAP SERPL CALC-SCNC: 10 MMOL/L (ref 8–16)
AST SERPL-CCNC: 18 U/L (ref 10–40)
BASOPHILS # BLD AUTO: 0 K/UL (ref 0–0.2)
BASOPHILS NFR BLD: 0 % (ref 0–1.9)
BILIRUB SERPL-MCNC: 0.5 MG/DL (ref 0.1–1)
BUN SERPL-MCNC: 12 MG/DL (ref 6–20)
CALCIUM SERPL-MCNC: 9.9 MG/DL (ref 8.7–10.5)
CHLORIDE SERPL-SCNC: 107 MMOL/L (ref 95–110)
CO2 SERPL-SCNC: 24 MMOL/L (ref 23–29)
CREAT SERPL-MCNC: 0.5 MG/DL (ref 0.5–1.4)
CRYPTOSP AG STL QL IA: NEGATIVE
DIFFERENTIAL METHOD: ABNORMAL
EOSINOPHIL # BLD AUTO: 0 K/UL (ref 0–0.5)
EOSINOPHIL NFR BLD: 0 % (ref 0–8)
ERYTHROCYTE [DISTWIDTH] IN BLOOD BY AUTOMATED COUNT: 14.6 % (ref 11.5–14.5)
EST. GFR  (NO RACE VARIABLE): >60 ML/MIN/1.73 M^2
G LAMBLIA AG STL QL IA: NEGATIVE
GLUCOSE SERPL-MCNC: 234 MG/DL (ref 70–110)
HCT VFR BLD AUTO: 32.2 % (ref 37–48.5)
HGB BLD-MCNC: 10.3 G/DL (ref 12–16)
IMM GRANULOCYTES # BLD AUTO: 0.01 K/UL (ref 0–0.04)
IMM GRANULOCYTES NFR BLD AUTO: 0.2 % (ref 0–0.5)
LYMPHOCYTES # BLD AUTO: 1 K/UL (ref 1–4.8)
LYMPHOCYTES NFR BLD: 15.7 % (ref 18–48)
MAGNESIUM SERPL-MCNC: 1.5 MG/DL (ref 1.6–2.6)
MCH RBC QN AUTO: 23.1 PG (ref 27–31)
MCHC RBC AUTO-ENTMCNC: 32 G/DL (ref 32–36)
MCV RBC AUTO: 72 FL (ref 82–98)
MONOCYTES # BLD AUTO: 0.5 K/UL (ref 0.3–1)
MONOCYTES NFR BLD: 7.4 % (ref 4–15)
NEUTROPHILS # BLD AUTO: 5.1 K/UL (ref 1.8–7.7)
NEUTROPHILS NFR BLD: 76.7 % (ref 38–73)
NRBC BLD-RTO: 0 /100 WBC
PHOSPHATE SERPL-MCNC: 3.3 MG/DL (ref 2.7–4.5)
PLATELET # BLD AUTO: 122 K/UL (ref 150–450)
PMV BLD AUTO: 10.9 FL (ref 9.2–12.9)
POTASSIUM SERPL-SCNC: 4.2 MMOL/L (ref 3.5–5.1)
PROT SERPL-MCNC: 6.1 G/DL (ref 6–8.4)
RBC # BLD AUTO: 4.45 M/UL (ref 4–5.4)
SODIUM SERPL-SCNC: 141 MMOL/L (ref 136–145)
T3 SERPL-MCNC: 403 NG/DL (ref 60–180)
WBC # BLD AUTO: 6.62 K/UL (ref 3.9–12.7)

## 2023-03-05 PROCEDURE — 63600175 PHARM REV CODE 636 W HCPCS: Performed by: INTERNAL MEDICINE

## 2023-03-05 PROCEDURE — 99222 PR INITIAL HOSPITAL CARE,LEVL II: ICD-10-PCS | Mod: 95,,, | Performed by: STUDENT IN AN ORGANIZED HEALTH CARE EDUCATION/TRAINING PROGRAM

## 2023-03-05 PROCEDURE — 21400001 HC TELEMETRY ROOM

## 2023-03-05 PROCEDURE — 85025 COMPLETE CBC W/AUTO DIFF WBC: CPT | Performed by: INTERNAL MEDICINE

## 2023-03-05 PROCEDURE — 63600175 PHARM REV CODE 636 W HCPCS: Performed by: HOSPITALIST

## 2023-03-05 PROCEDURE — 99222 1ST HOSP IP/OBS MODERATE 55: CPT | Mod: 95,,, | Performed by: STUDENT IN AN ORGANIZED HEALTH CARE EDUCATION/TRAINING PROGRAM

## 2023-03-05 PROCEDURE — 80053 COMPREHEN METABOLIC PANEL: CPT | Performed by: INTERNAL MEDICINE

## 2023-03-05 PROCEDURE — 25000003 PHARM REV CODE 250: Performed by: HOSPITALIST

## 2023-03-05 PROCEDURE — 83735 ASSAY OF MAGNESIUM: CPT | Performed by: INTERNAL MEDICINE

## 2023-03-05 PROCEDURE — 84100 ASSAY OF PHOSPHORUS: CPT | Performed by: INTERNAL MEDICINE

## 2023-03-05 PROCEDURE — S4991 NICOTINE PATCH NONLEGEND: HCPCS | Performed by: STUDENT IN AN ORGANIZED HEALTH CARE EDUCATION/TRAINING PROGRAM

## 2023-03-05 PROCEDURE — 25000003 PHARM REV CODE 250: Performed by: INTERNAL MEDICINE

## 2023-03-05 PROCEDURE — 25000003 PHARM REV CODE 250: Performed by: STUDENT IN AN ORGANIZED HEALTH CARE EDUCATION/TRAINING PROGRAM

## 2023-03-05 RX ORDER — ESCITALOPRAM OXALATE 5 MG/1
5 TABLET ORAL DAILY
Status: DISCONTINUED | OUTPATIENT
Start: 2023-03-05 | End: 2023-03-08 | Stop reason: HOSPADM

## 2023-03-05 RX ORDER — GLUCAGON 1 MG
1 KIT INJECTION
Status: DISCONTINUED | OUTPATIENT
Start: 2023-03-05 | End: 2023-03-08 | Stop reason: HOSPADM

## 2023-03-05 RX ORDER — IBUPROFEN 200 MG
24 TABLET ORAL
Status: DISCONTINUED | OUTPATIENT
Start: 2023-03-05 | End: 2023-03-08 | Stop reason: HOSPADM

## 2023-03-05 RX ORDER — IBUPROFEN 200 MG
16 TABLET ORAL
Status: DISCONTINUED | OUTPATIENT
Start: 2023-03-05 | End: 2023-03-08 | Stop reason: HOSPADM

## 2023-03-05 RX ORDER — INSULIN ASPART 100 [IU]/ML
0-5 INJECTION, SOLUTION INTRAVENOUS; SUBCUTANEOUS
Status: DISCONTINUED | OUTPATIENT
Start: 2023-03-05 | End: 2023-03-08 | Stop reason: HOSPADM

## 2023-03-05 RX ADMIN — Medication 6 MG: at 09:03

## 2023-03-05 RX ADMIN — LOPERAMIDE HYDROCHLORIDE 2 MG: 2 CAPSULE ORAL at 11:03

## 2023-03-05 RX ADMIN — HYDROCORTISONE SODIUM SUCCINATE 100 MG: 100 INJECTION, POWDER, FOR SOLUTION INTRAMUSCULAR; INTRAVENOUS at 09:03

## 2023-03-05 RX ADMIN — PROPRANOLOL HYDROCHLORIDE 60 MG: 40 TABLET ORAL at 05:03

## 2023-03-05 RX ADMIN — PROPYLTHIOURACIL 200 MG: 50 TABLET ORAL at 05:03

## 2023-03-05 RX ADMIN — Medication 1 PATCH: at 09:03

## 2023-03-05 RX ADMIN — HYDROCORTISONE SODIUM SUCCINATE 100 MG: 100 INJECTION, POWDER, FOR SOLUTION INTRAMUSCULAR; INTRAVENOUS at 05:03

## 2023-03-05 RX ADMIN — LOPERAMIDE HYDROCHLORIDE 2 MG: 2 CAPSULE ORAL at 09:03

## 2023-03-05 RX ADMIN — MINERAL OIL, PETROLATUM, PHENYLEPHRINE HCL 1 APPLICATOR: 2.5; 140; 749 OINTMENT TOPICAL at 09:03

## 2023-03-05 RX ADMIN — PROPYLTHIOURACIL 200 MG: 50 TABLET ORAL at 02:03

## 2023-03-05 RX ADMIN — MINERAL OIL, PETROLATUM, PHENYLEPHRINE HCL 1 APPLICATOR: 2.5; 140; 749 OINTMENT TOPICAL at 11:03

## 2023-03-05 RX ADMIN — PROPYLTHIOURACIL 200 MG: 50 TABLET ORAL at 09:03

## 2023-03-05 RX ADMIN — OXYCODONE HYDROCHLORIDE 5 MG: 5 TABLET ORAL at 09:03

## 2023-03-05 RX ADMIN — HYDROCORTISONE SODIUM SUCCINATE 100 MG: 100 INJECTION, POWDER, FOR SOLUTION INTRAMUSCULAR; INTRAVENOUS at 01:03

## 2023-03-05 RX ADMIN — IODINE SOLUTION STRONG 5% (LUGOL'S) 3 DROP: 5 SOLUTION at 05:03

## 2023-03-05 RX ADMIN — ENOXAPARIN SODIUM 40 MG: 40 INJECTION SUBCUTANEOUS at 05:03

## 2023-03-05 RX ADMIN — PROPRANOLOL HYDROCHLORIDE 60 MG: 40 TABLET ORAL at 11:03

## 2023-03-05 RX ADMIN — OXYCODONE HYDROCHLORIDE 5 MG: 5 TABLET ORAL at 07:03

## 2023-03-05 RX ADMIN — ESCITALOPRAM 5 MG: 5 TABLET, FILM COATED ORAL at 11:03

## 2023-03-05 RX ADMIN — INSULIN ASPART 1 UNITS: 100 INJECTION, SOLUTION INTRAVENOUS; SUBCUTANEOUS at 07:03

## 2023-03-05 RX ADMIN — IODINE SOLUTION STRONG 5% (LUGOL'S) 3 DROP: 5 SOLUTION at 11:03

## 2023-03-05 NOTE — EICU
Alerted by bedside regarding patient with diarrhoea and hemorrhoids.   Prep H ordered     Nicotine patch
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
## 2023-03-05 NOTE — NURSING
Pt requesting to use phone to check on her grand-children, she is tearful but cooperative and has expressed that she just wants to say marvin to them. Requested room phone from charge RN and with permission and agreement from myself and charge RN, pt attempted to contact her son Staci without success while I remained at bedside. Call went to  both times, pt verbalized understanding, seems ok with this results and understands that they may be asleep. Will attempt again in am.

## 2023-03-05 NOTE — PROVIDER TRANSFER
Transfer Note    42 y/o female presents with thyrotoxic crisis.   [facility] Endocrinology consulted.  Recommended:          1) PTU loading dose 400mg, then 200mg Q4 hours          2) Propranolol 60-80mg po Q6 hours          3) Hydrocortisone 100mg iv q8 hours          4) SSKI drops, 3-4 drops po 3-4 times per day  Recommended monitoring overnight in ICU as patient presented febrile and tachycardic.  Now afebrile and hemodynamically stable.  She was going to be transferred on 3/4, but then had episode of delirium/psychosis?  Unsure if related to thyroid storm.  Patient was PEC by Dr. Chappell.  Tele Psych consulted.  PEC rescinded.  Started on Lexapro for depression.  Much better today.  Calm and cooperative.  Transfer to floor.  Will probably need to call [facility] Endo tomorrow for further recommendations (transitioning to PO medications).

## 2023-03-05 NOTE — PLAN OF CARE
[place] - Intensive Care  Initial Discharge Assessment  Patient presently in ICU, lives with her mother with help from her dtr.Leroyjusten Gordo [phone]. No dme.  Plan A:  home with Family     Primary Care Provider:  [facility]    Admission Diagnosis: Tachycardia [R00.0]  SIRS (systemic inflammatory response syndrome) [R65.10]  Chest pain [R07.9]  Thyrotoxicosis with thyrotoxic crisis, unspecified thyrotoxicosis type [E05.91]  Thyrotoxicosis due to acute thyroiditis [E05.80, E06.0]    Admission Date: 3/3/2023  Expected Discharge Date:     Discharge Barriers Identified: None    Payor: MEDICAID / Plan: [facility] COMMUNITY PLAN [employer] ([place] MEDICA[place]) / Product Type: Managed Medicaid /     Extended Emergency Contact Information  Primary Emergency Contact: GREG DUQUE  Mobile Phone: [phone]  Relation: Daughter  Preferred language: English   needed? No    Discharge Plan A: Home with family  Discharge Plan B: Home with family      [id number] [place] [address] [place] [address] [place] [address]  Phone: [phone] Fax: [phone]      Initial Assessment (most recent)       Adult Discharge Assessment - 03/05/23 1049          Discharge Assessment    Assessment Type Discharge Planning Assessment     Confirmed/corrected address, phone number and insurance Yes     Source of Information patient     Communicated FLORY with patient/caregiver Date not available/Unable to determine     Reason For Admission Thyrotoxicosis with thyrotoxic crisis     People in Home parent(s)     Do you expect to return to your current living situation? Yes     Do you have help at home or someone to help you manage your care at home? Yes     Who are your caregiver(s) and their phone number(s)? LEROY DUQUEJUSTEN (Daughter)   [phone] (Mobile)     Prior to hospitilization cognitive status: Alert/Oriented     Current cognitive  status: Alert/Oriented     Equipment Currently Used at Home none     Readmission within 30 days? No     Patient currently being followed by outpatient case management? Unable to determine (comments)     Do you currently have service(s) that help you manage your care at home? No     Do you take prescription medications? No     Do you have prescription coverage? Yes     Coverage MEDICAID - [facility] COMMUNITY PLAN [facility] ([place] MEDICAID)     Do you have any problems affording any of your prescribed medications? TBD     Who is going to help you get home at discharge? GREG DUQUE (Daughter)   [phone] (Mobile)     How do you get to doctors appointments? family or friend will provide     Are you on dialysis? No     Do you take coumadin? No     Discharge Plan A Home with family     Discharge Plan B Home with family     DME Needed Upon Discharge  none     Discharge Plan discussed with: Patient     Discharge Barriers Identified None        OTHER    Name(s) of People in Home GREG DUQUE (Daughter)   [phone] (Mobile)
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
## 2023-03-05 NOTE — SUBJECTIVE & OBJECTIVE
Interval History: delirious and agitated overnight.  Much calmer today.    Review of Systems   HENT:  Negative for ear discharge and ear pain.    Eyes:  Negative for discharge and itching.   Endocrine: Negative for cold intolerance and heat intolerance.   Neurological:  Negative for seizures and syncope.   Objective:     Vital Signs (Most Recent):  Temp: 97.6 °F (36.4 °C) (03/05/23 0805)  Pulse: 91 (03/05/23 1000)  Resp: (!) 31 (03/05/23 1000)  BP: 134/74 (03/05/23 1000)  SpO2: 98 % (03/05/23 1000)   Vital Signs (24h Range):  Temp:  [97.6 °F (36.4 °C)-98.5 °F (36.9 °C)] 97.6 °F (36.4 °C)  Pulse:  [] 91  Resp:  [18-51] 31  SpO2:  [92 %-100 %] 98 %  BP: (122-161)/(60-99) 134/74     Weight: 53 kg (116 lb 13.5 oz)  Body mass index is 21.37 kg/m².    Intake/Output Summary (Last 24 hours) at 3/5/2023 1105  Last data filed at 3/5/2023 0946  Gross per 24 hour   Intake 2012.29 ml   Output --   Net 2012.29 ml      Physical Exam  Vitals and nursing note reviewed.   Constitutional:       General: She is not in acute distress.     Appearance: She is well-developed. She is not diaphoretic.   HENT:      Head: Normocephalic and atraumatic.      Mouth/Throat:      Pharynx: No oropharyngeal exudate.   Eyes:      General: No scleral icterus.        Right eye: No discharge.         Left eye: No discharge.      Conjunctiva/sclera: Conjunctivae normal.      Pupils: Pupils are equal, round, and reactive to light.   Neck:      Thyroid: No thyromegaly.      Vascular: No JVD.      Trachea: No tracheal deviation.   Cardiovascular:      Rate and Rhythm: Normal rate and regular rhythm.      Heart sounds: Normal heart sounds. No murmur heard.    No friction rub. No gallop.   Pulmonary:      Effort: Pulmonary effort is normal. No respiratory distress.      Breath sounds: Normal breath sounds. No stridor. No wheezing or rales.   Chest:      Chest wall: No tenderness.   Abdominal:      General: Bowel sounds are normal. There is no distension.       Palpations: Abdomen is soft. There is no mass.      Tenderness: There is no guarding or rebound.   Musculoskeletal:         General: No tenderness. Normal range of motion.      Cervical back: Normal range of motion and neck supple.   Lymphadenopathy:      Cervical: No cervical adenopathy.   Skin:     General: Skin is warm and dry.      Coloration: Skin is not pale.      Findings: No erythema or rash.      Comments: Multiple tattoos.   Neurological:      Mental Status: She is alert and oriented to person, place, and time.      Cranial Nerves: No cranial nerve deficit.      Motor: No abnormal muscle tone.      Coordination: Coordination normal.      Deep Tendon Reflexes: Reflexes are normal and symmetric. Reflexes normal.   Psychiatric:         Behavior: Behavior normal.         Thought Content: Thought content normal.         Judgment: Judgment normal.       Significant Labs: All pertinent labs within the past 24 hours have been reviewed.  BMP:   Recent Labs   Lab 03/05/23  0412   *      K 4.2      CO2 24   BUN 12   CREATININE 0.5   CALCIUM 9.9   MG 1.5*     CBC:   Recent Labs   Lab 03/03/23  2203 03/04/23  0357 03/05/23  0412   WBC 10.46 8.12 6.62   HGB 12.1 11.9* 10.3*   HCT 36.3* 37.1 32.2*   * 131* 122*       Significant Imaging: I have reviewed all pertinent imaging results/findings within the past 24 hours.

## 2023-03-05 NOTE — NURSING
Arrived on unit, assumed care of pt from Bernice charge RN who was at bedside with 1:1 supervision upon arrival, assessed at bedside. Pt c/o severe rectal pain from diarrhea requesting preparation H or tucks pad for pain. Also complaining of wanting a cigarette badly, explained that she cannot leave the floor, pt started crying, offered to request nicotine patch, pt agreed. Notified eICU, awaiting orders.
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
## 2023-03-05 NOTE — NURSING
[facility]  Nurses Note -- 4 Eyes      3/4/2023       Skin assessed on: Q Shift      [x] No Pressure Injuries Present    [x]Prevention Measures Documented    [] Yes LDA  for Pressure Injury Previously documented     [] Yes New Pressure Injury Discovered   [] LDA for New Pressure Injury Added      Attending RN:  NENA OSMAN RN     Second RN:  TIM Hopkins

## 2023-03-05 NOTE — PLAN OF CARE
Pt free from fall/injury, ambulates with stand-by assist to BSC. PEC in place as of 3/4/23 @ 1948. VSS, afebrile. See extensive note written

## 2023-03-05 NOTE — ASSESSMENT & PLAN NOTE
She was admitted to [facility] with the same 1 month ago and left AMA   -No new precipitating event such as infection, etc., noted  Case was discussed with Endocrinology on call  They recommended the following interventions:   1) PTU loading dose 400mg, then 200mg Q4 hours   2) Propranolol 60-80mg po Q6 hours   3) Hydrocortisone 100mg iv q8 hours   4) SSKI drops, 3-4 drops po 3-4 times per day  TSH, FT4, T3, thyrotropin receptor antibody  Improving.  Resolution of fevers and tachycardia.  Improving diarrhea.  Continue current management.  Discussed with Endocrinology tomorrow.

## 2023-03-05 NOTE — NURSING
Nurses Note -- 4 Eyes      3/5/2023   5:45 PM      Skin assessed during: Daily Assessment      [x] No Pressure Injuries Present    []Prevention Measures Documented      [] Yes- Altered Skin Integrity Present or Discovered   [] LDA Added if Not in Epic (Describe Wound)   [] New Altered Skin Integrity was Present on Admit and Documented in LDA   [] Wound Image Taken    Wound Care Consulted? No    Attending Nurse:  LETICIA ISABEL RN     Second RN/Staff Member:
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
## 2023-03-05 NOTE — NURSING
Report handed off to sitter for 1:1 observation, pt aware of sitter at bedside and me remaining her RN for the rest of the shift.

## 2023-03-06 PROBLEM — Z71.89 ADVANCED CARE PLANNING/COUNSELING DISCUSSION: Status: ACTIVE | Noted: 2023-03-06

## 2023-03-06 LAB
ALBUMIN SERPL BCP-MCNC: 2.9 G/DL (ref 3.5–5.2)
ALP SERPL-CCNC: 234 U/L (ref 55–135)
ALT SERPL W/O P-5'-P-CCNC: 21 U/L (ref 10–44)
ANION GAP SERPL CALC-SCNC: 9 MMOL/L (ref 8–16)
AST SERPL-CCNC: 22 U/L (ref 10–40)
BASOPHILS # BLD AUTO: 0 K/UL (ref 0–0.2)
BASOPHILS NFR BLD: 0 % (ref 0–1.9)
BILIRUB SERPL-MCNC: 0.5 MG/DL (ref 0.1–1)
BUN SERPL-MCNC: 13 MG/DL (ref 6–20)
CALCIUM SERPL-MCNC: 9.2 MG/DL (ref 8.7–10.5)
CHLORIDE SERPL-SCNC: 103 MMOL/L (ref 95–110)
CO2 SERPL-SCNC: 26 MMOL/L (ref 23–29)
CREAT SERPL-MCNC: 0.5 MG/DL (ref 0.5–1.4)
DIFFERENTIAL METHOD: ABNORMAL
E COLI SXT1 STL QL IA: NEGATIVE
E COLI SXT2 STL QL IA: NEGATIVE
ELASTASE 1, FECAL: 395 MCG/G
EOSINOPHIL # BLD AUTO: 0 K/UL (ref 0–0.5)
EOSINOPHIL NFR BLD: 0 % (ref 0–8)
ERYTHROCYTE [DISTWIDTH] IN BLOOD BY AUTOMATED COUNT: 14 % (ref 11.5–14.5)
EST. GFR  (NO RACE VARIABLE): >60 ML/MIN/1.73 M^2
GLUCOSE SERPL-MCNC: 170 MG/DL (ref 70–110)
HCT VFR BLD AUTO: 34.3 % (ref 37–48.5)
HGB BLD-MCNC: 10.9 G/DL (ref 12–16)
IMM GRANULOCYTES # BLD AUTO: 0.02 K/UL (ref 0–0.04)
IMM GRANULOCYTES NFR BLD AUTO: 0.4 % (ref 0–0.5)
LYMPHOCYTES # BLD AUTO: 1.4 K/UL (ref 1–4.8)
LYMPHOCYTES NFR BLD: 25.5 % (ref 18–48)
MAGNESIUM SERPL-MCNC: 1.7 MG/DL (ref 1.6–2.6)
MCH RBC QN AUTO: 23 PG (ref 27–31)
MCHC RBC AUTO-ENTMCNC: 31.8 G/DL (ref 32–36)
MCV RBC AUTO: 72 FL (ref 82–98)
MONOCYTES # BLD AUTO: 0.3 K/UL (ref 0.3–1)
MONOCYTES NFR BLD: 5.4 % (ref 4–15)
NEUTROPHILS # BLD AUTO: 3.7 K/UL (ref 1.8–7.7)
NEUTROPHILS NFR BLD: 68.7 % (ref 38–73)
NRBC BLD-RTO: 0 /100 WBC
PHOSPHATE SERPL-MCNC: 3.6 MG/DL (ref 2.7–4.5)
PLATELET # BLD AUTO: 152 K/UL (ref 150–450)
PMV BLD AUTO: 11 FL (ref 9.2–12.9)
POCT GLUCOSE: 100 MG/DL (ref 70–110)
POCT GLUCOSE: 135 MG/DL (ref 70–110)
POCT GLUCOSE: 144 MG/DL (ref 70–110)
POCT GLUCOSE: 204 MG/DL (ref 70–110)
POTASSIUM SERPL-SCNC: 3.5 MMOL/L (ref 3.5–5.1)
PROT SERPL-MCNC: 6.2 G/DL (ref 6–8.4)
RBC # BLD AUTO: 4.74 M/UL (ref 4–5.4)
SODIUM SERPL-SCNC: 138 MMOL/L (ref 136–145)
T3 SERPL-MCNC: 63 NG/DL (ref 60–180)
T4 FREE SERPL-MCNC: 2.48 NG/DL (ref 0.71–1.51)
TSH RECEP AB SER-ACNC: 18 IU/L (ref 0–1.75)
WBC # BLD AUTO: 5.34 K/UL (ref 3.9–12.7)

## 2023-03-06 PROCEDURE — 85025 COMPLETE CBC W/AUTO DIFF WBC: CPT | Performed by: INTERNAL MEDICINE

## 2023-03-06 PROCEDURE — 84439 ASSAY OF FREE THYROXINE: CPT | Performed by: STUDENT IN AN ORGANIZED HEALTH CARE EDUCATION/TRAINING PROGRAM

## 2023-03-06 PROCEDURE — 83735 ASSAY OF MAGNESIUM: CPT | Performed by: INTERNAL MEDICINE

## 2023-03-06 PROCEDURE — 25000003 PHARM REV CODE 250: Performed by: INTERNAL MEDICINE

## 2023-03-06 PROCEDURE — 21400001 HC TELEMETRY ROOM

## 2023-03-06 PROCEDURE — 36415 COLL VENOUS BLD VENIPUNCTURE: CPT | Performed by: STUDENT IN AN ORGANIZED HEALTH CARE EDUCATION/TRAINING PROGRAM

## 2023-03-06 PROCEDURE — 80053 COMPREHEN METABOLIC PANEL: CPT | Performed by: INTERNAL MEDICINE

## 2023-03-06 PROCEDURE — 63600175 PHARM REV CODE 636 W HCPCS: Performed by: INTERNAL MEDICINE

## 2023-03-06 PROCEDURE — 25000003 PHARM REV CODE 250: Performed by: STUDENT IN AN ORGANIZED HEALTH CARE EDUCATION/TRAINING PROGRAM

## 2023-03-06 PROCEDURE — 25000003 PHARM REV CODE 250: Performed by: HOSPITALIST

## 2023-03-06 PROCEDURE — S4991 NICOTINE PATCH NONLEGEND: HCPCS | Performed by: STUDENT IN AN ORGANIZED HEALTH CARE EDUCATION/TRAINING PROGRAM

## 2023-03-06 PROCEDURE — 84100 ASSAY OF PHOSPHORUS: CPT | Performed by: INTERNAL MEDICINE

## 2023-03-06 PROCEDURE — 84480 ASSAY TRIIODOTHYRONINE (T3): CPT | Performed by: STUDENT IN AN ORGANIZED HEALTH CARE EDUCATION/TRAINING PROGRAM

## 2023-03-06 RX ORDER — ATENOLOL 25 MG/1
25 TABLET ORAL DAILY
Status: DISCONTINUED | OUTPATIENT
Start: 2023-03-07 | End: 2023-03-08 | Stop reason: HOSPADM

## 2023-03-06 RX ORDER — METHIMAZOLE 5 MG/1
20 TABLET ORAL 2 TIMES DAILY
Status: DISCONTINUED | OUTPATIENT
Start: 2023-03-06 | End: 2023-03-08 | Stop reason: HOSPADM

## 2023-03-06 RX ADMIN — PROPRANOLOL HYDROCHLORIDE 60 MG: 40 TABLET ORAL at 06:03

## 2023-03-06 RX ADMIN — IODINE SOLUTION STRONG 5% (LUGOL'S) 3 DROP: 5 SOLUTION at 12:03

## 2023-03-06 RX ADMIN — OXYCODONE HYDROCHLORIDE 5 MG: 5 TABLET ORAL at 08:03

## 2023-03-06 RX ADMIN — Medication 1 PATCH: at 08:03

## 2023-03-06 RX ADMIN — PROPYLTHIOURACIL 200 MG: 50 TABLET ORAL at 02:03

## 2023-03-06 RX ADMIN — METHIMAZOLE 20 MG: 5 TABLET ORAL at 10:03

## 2023-03-06 RX ADMIN — PROPYLTHIOURACIL 200 MG: 50 TABLET ORAL at 06:03

## 2023-03-06 RX ADMIN — METHIMAZOLE 20 MG: 5 TABLET ORAL at 09:03

## 2023-03-06 RX ADMIN — PROPRANOLOL HYDROCHLORIDE 60 MG: 40 TABLET ORAL at 05:03

## 2023-03-06 RX ADMIN — ESCITALOPRAM 5 MG: 5 TABLET, FILM COATED ORAL at 08:03

## 2023-03-06 RX ADMIN — LOPERAMIDE HYDROCHLORIDE 2 MG: 2 CAPSULE ORAL at 09:03

## 2023-03-06 RX ADMIN — PROPYLTHIOURACIL 200 MG: 50 TABLET ORAL at 09:03

## 2023-03-06 RX ADMIN — HYDROCORTISONE SODIUM SUCCINATE 100 MG: 100 INJECTION, POWDER, FOR SOLUTION INTRAMUSCULAR; INTRAVENOUS at 06:03

## 2023-03-06 RX ADMIN — Medication 6 MG: at 10:03

## 2023-03-06 RX ADMIN — PROPRANOLOL HYDROCHLORIDE 60 MG: 40 TABLET ORAL at 12:03

## 2023-03-06 RX ADMIN — OXYCODONE HYDROCHLORIDE 5 MG: 5 TABLET ORAL at 05:03

## 2023-03-06 RX ADMIN — PROPRANOLOL HYDROCHLORIDE 60 MG: 40 TABLET ORAL at 01:03

## 2023-03-06 RX ADMIN — IODINE SOLUTION STRONG 5% (LUGOL'S) 3 DROP: 5 SOLUTION at 06:03

## 2023-03-06 RX ADMIN — ENOXAPARIN SODIUM 40 MG: 40 INJECTION SUBCUTANEOUS at 05:03

## 2023-03-06 NOTE — NURSING
Manual BP noted 180/88, will administer scheduled propanolol 60 mg per orders. Notified boom Arshad with administering propanolol and recheck BP in one hour.

## 2023-03-06 NOTE — PLAN OF CARE
Pt free from fall/injury, ambulates without assistance. No longer PEC'd. Stable mood, feels hopeful with better understanding of diagnosis. VSS, no s/s of infection. Diarrhea resolving.

## 2023-03-06 NOTE — NURSING
Patient arrived to the floor from ICU.  Patient accompanied by sitter.  AAOX4, denies chest pain or SOB at the present time.  Tele monitor applied.  Noted NSR 84 at the present time.  Acclimated to call bell use, and updated on the current treatment plan.  Will continue to monitor.

## 2023-03-06 NOTE — ASSESSMENT & PLAN NOTE
Monitor on Propranolol  Endocrinology at [facility] recommend changing propranolol to atenolol in the a.m.

## 2023-03-06 NOTE — SUBJECTIVE & OBJECTIVE
Interval History:  No acute overnight events.  Patient remained afebrile.  Doing better this morning.  States she feels better overall.  Very calm this morning.    Review of Systems   Constitutional:  Negative for chills, fatigue and fever.   Respiratory:  Negative for cough, chest tightness and shortness of breath.    Cardiovascular:  Negative for chest pain.   Gastrointestinal:  Negative for abdominal pain, diarrhea, nausea and vomiting.   Musculoskeletal:  Negative for joint swelling.   Neurological:  Negative for dizziness, seizures, syncope, weakness and headaches.     Objective:     Vital Signs (Most Recent):  Temp: 98 °F (36.7 °C) (03/06/23 1149)  Pulse: 82 (03/06/23 1149)  Resp: 18 (03/06/23 1149)  BP: (!) 171/96 (03/06/23 1149)  SpO2: 96 % (03/06/23 1149)   Vital Signs (24h Range):  Temp:  [97.8 °F (36.6 °C)-98.5 °F (36.9 °C)] 98 °F (36.7 °C)  Pulse:  [82-91] 82  Resp:  [18-64] 18  SpO2:  [93 %-100 %] 96 %  BP: (126-171)/(69-96) 171/96     Weight: 53 kg (116 lb 13.5 oz)  Body mass index is 21.37 kg/m².    Intake/Output Summary (Last 24 hours) at 3/6/2023 1646  Last data filed at 3/6/2023 0518  Gross per 24 hour   Intake 950 ml   Output --   Net 950 ml        Physical Exam  Vitals and nursing note reviewed.   Constitutional:       General: She is not in acute distress.     Appearance: She is well-developed. She is not diaphoretic.   HENT:      Head: Normocephalic and atraumatic.      Mouth/Throat:      Pharynx: No oropharyngeal exudate.   Eyes:      Extraocular Movements: Extraocular movements intact.      Conjunctiva/sclera: Conjunctivae normal.   Cardiovascular:      Rate and Rhythm: Normal rate and regular rhythm.      Heart sounds: Normal heart sounds. No murmur heard.    No friction rub. No gallop.   Pulmonary:      Effort: Pulmonary effort is normal. No respiratory distress.      Breath sounds: Normal breath sounds. No stridor. No wheezing or rales.   Chest:      Chest wall: No tenderness.   Abdominal:       General: Bowel sounds are normal. There is no distension.      Palpations: Abdomen is soft. There is no mass.      Tenderness: There is no guarding or rebound.   Musculoskeletal:         General: No swelling or tenderness. Normal range of motion.      Cervical back: Normal range of motion. No edema or erythema.      Right lower leg: No edema.      Left lower leg: No edema.   Skin:     General: Skin is warm and dry.      Coloration: Skin is not pale.      Findings: No erythema or rash.      Comments: Multiple tattoos.   Neurological:      General: No focal deficit present.      Mental Status: She is alert and oriented to person, place, and time.      Motor: No abnormal muscle tone.      Deep Tendon Reflexes: Reflexes are normal and symmetric.   Psychiatric:         Behavior: Behavior normal.         Thought Content: Thought content normal.         Judgment: Judgment normal.       Significant Labs: All pertinent labs within the past 24 hours have been reviewed.  BMP:   Recent Labs   Lab 03/06/23  0336   *      K 3.5      CO2 26   BUN 13   CREATININE 0.5   CALCIUM 9.2   MG 1.7       CBC:   Recent Labs   Lab 03/05/23  0412 03/06/23  0336   WBC 6.62 5.34   HGB 10.3* 10.9*   HCT 32.2* 34.3*   * 152         Significant Imaging: I have reviewed all pertinent imaging results/findings within the past 24 hours.

## 2023-03-06 NOTE — ASSESSMENT & PLAN NOTE
Advance Care Planning     Date: 03/06/2023    Code Status  I engaged the the patient in a conversation about the patient's preferences for care  at the very end of life. The patient wishes to have CPR and other invasive treatments performed when her heart and/or breathing stops. I communicated to the patient that her wishes align with full code status. She agrees and verbalized understanding.  I spent a total of 16 minutes engaging the patient in this advance care planning discussion.         Code Status: Full Code

## 2023-03-06 NOTE — ASSESSMENT & PLAN NOTE
She was admitted to [facility] with the same 1 month ago and left AMA   -No new precipitating event such as infection, etc., noted  Case was discussed with Endocrinology on call  They recommended the following interventions:   1) PTU loading dose 400mg, then 200mg Q4 hours   2) Propranolol 60-80mg po Q6 hours   3) Hydrocortisone 100mg iv q8 hours   4) SSKI drops, 3-4 drops po 3-4 times per day  -TSH, FT4, T3, thyrotropin receptor antibody  -Improving.  Resolution of fevers and tachycardia.  Improving diarrhea.    -Discussed with Endocrinology 03/06: recommended to change PTU to methimazole 20 mg b.i.d., discontinue propranolol and restart atenolol 25 mg daily in the a.m. , start weaning IV hydrocortisone to 50 mg b.i.d. tomorrow and then 25 mg b.i.d. after that.  Okay to discontinue the eyedrops.  Recommend repeating T3 and T4 level.  -T3 and free T4 pending
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
## 2023-03-07 LAB
BACTERIA BLD CULT: NORMAL
BACTERIA BLD CULT: NORMAL
BACTERIA STL CULT: NORMAL
POCT GLUCOSE: 130 MG/DL (ref 70–110)
POCT GLUCOSE: 135 MG/DL (ref 70–110)
POCT GLUCOSE: 141 MG/DL (ref 70–110)
POCT GLUCOSE: 93 MG/DL (ref 70–110)

## 2023-03-07 PROCEDURE — 21400001 HC TELEMETRY ROOM

## 2023-03-07 PROCEDURE — 25000003 PHARM REV CODE 250: Performed by: HOSPITALIST

## 2023-03-07 PROCEDURE — 25000003 PHARM REV CODE 250: Performed by: STUDENT IN AN ORGANIZED HEALTH CARE EDUCATION/TRAINING PROGRAM

## 2023-03-07 PROCEDURE — S4991 NICOTINE PATCH NONLEGEND: HCPCS | Performed by: STUDENT IN AN ORGANIZED HEALTH CARE EDUCATION/TRAINING PROGRAM

## 2023-03-07 PROCEDURE — 25000003 PHARM REV CODE 250: Performed by: INTERNAL MEDICINE

## 2023-03-07 PROCEDURE — 63600175 PHARM REV CODE 636 W HCPCS: Performed by: STUDENT IN AN ORGANIZED HEALTH CARE EDUCATION/TRAINING PROGRAM

## 2023-03-07 PROCEDURE — 63600175 PHARM REV CODE 636 W HCPCS: Performed by: INTERNAL MEDICINE

## 2023-03-07 RX ADMIN — Medication 6 MG: at 10:03

## 2023-03-07 RX ADMIN — HYDROCORTISONE SODIUM SUCCINATE 50 MG: 100 INJECTION, POWDER, FOR SOLUTION INTRAMUSCULAR; INTRAVENOUS at 10:03

## 2023-03-07 RX ADMIN — Medication 1 PATCH: at 09:03

## 2023-03-07 RX ADMIN — HYDROCORTISONE SODIUM SUCCINATE 50 MG: 100 INJECTION, POWDER, FOR SOLUTION INTRAMUSCULAR; INTRAVENOUS at 09:03

## 2023-03-07 RX ADMIN — OXYCODONE HYDROCHLORIDE 5 MG: 5 TABLET ORAL at 06:03

## 2023-03-07 RX ADMIN — ESCITALOPRAM 5 MG: 5 TABLET, FILM COATED ORAL at 09:03

## 2023-03-07 RX ADMIN — METHIMAZOLE 20 MG: 5 TABLET ORAL at 09:03

## 2023-03-07 RX ADMIN — ALUMINUM HYDROXIDE, MAGNESIUM HYDROXIDE, AND SIMETHICONE 30 ML: 200; 200; 20 SUSPENSION ORAL at 02:03

## 2023-03-07 RX ADMIN — ENOXAPARIN SODIUM 40 MG: 40 INJECTION SUBCUTANEOUS at 06:03

## 2023-03-07 RX ADMIN — OXYCODONE HYDROCHLORIDE 5 MG: 5 TABLET ORAL at 10:03

## 2023-03-07 RX ADMIN — ATENOLOL 25 MG: 25 TABLET ORAL at 09:03

## 2023-03-07 NOTE — NURSING
Patient states relief in chest pain.  Will continue to monitor.  Call bell in reach.  Sitter remains at bedside.

## 2023-03-07 NOTE — ASSESSMENT & PLAN NOTE
Monitor on Propranolol  Endocrinology at [facility] [place] recommend changing propranolol to atenolol on 03/07  Changed to atenolol 03/07  Monitor BP closely

## 2023-03-07 NOTE — PROGRESS NOTES
03/07/23 1414   Vital Signs   Temp 98 °F (36.7 °C)   Temp Source Oral   Pulse 78   Heart Rate Source Monitor   Resp 17   SpO2 96 %   BP (!) 155/92   MAP (mmHg) 118   BP Location Right arm   Patient Position Lying     Patient c/o mid-sternal chest pain 9/10.  States that it feels as if she is being stabbed.  Notified Dr. Huynh, en route.  EKG performed, oxygen applied at 2L/NC.  Vital signs noted above.

## 2023-03-07 NOTE — SUBJECTIVE & OBJECTIVE
Interval History:  No acute overnight events.  Patient remained afebrile.  Doing good this morning.  States she feels better overall.  Very calm this morning. Wants to go home soon     Review of Systems   Constitutional:  Negative for chills, fatigue and fever.   Respiratory:  Negative for cough, chest tightness and shortness of breath.    Cardiovascular:  Negative for chest pain.   Gastrointestinal:  Negative for abdominal pain, diarrhea, nausea and vomiting.   Musculoskeletal:  Negative for joint swelling.   Neurological:  Negative for dizziness, seizures, syncope, weakness and headaches.     Objective:     Vital Signs (Most Recent):  Temp: 98.7 °F (37.1 °C) (03/07/23 0728)  Pulse: 70 (03/07/23 0728)  Resp: 17 (03/07/23 0728)  BP: (!) 144/83 (03/07/23 0728)  SpO2: 95 % (03/07/23 0728)   Vital Signs (24h Range):  Temp:  [97.4 °F (36.3 °C)-98.7 °F (37.1 °C)] 98.7 °F (37.1 °C)  Pulse:  [70-83] 70  Resp:  [17-22] 17  SpO2:  [94 %-96 %] 95 %  BP: (132-180)/() 144/83     Weight: 53 kg (116 lb 13.5 oz)  Body mass index is 21.37 kg/m².    Intake/Output Summary (Last 24 hours) at 3/7/2023 0940  Last data filed at 3/7/2023 0820  Gross per 24 hour   Intake 240 ml   Output --   Net 240 ml        Physical Exam  Vitals and nursing note reviewed.   Constitutional:       General: She is not in acute distress.     Appearance: She is well-developed. She is not diaphoretic.   HENT:      Head: Normocephalic and atraumatic.      Mouth/Throat:      Pharynx: No oropharyngeal exudate.   Eyes:      Extraocular Movements: Extraocular movements intact.      Conjunctiva/sclera: Conjunctivae normal.   Cardiovascular:      Rate and Rhythm: Normal rate and regular rhythm.      Heart sounds: Normal heart sounds. No murmur heard.    No friction rub. No gallop.   Pulmonary:      Effort: Pulmonary effort is normal. No respiratory distress.      Breath sounds: Normal breath sounds. No stridor. No wheezing or rales.   Chest:      Chest wall: No  tenderness.   Abdominal:      General: Bowel sounds are normal. There is no distension.      Palpations: Abdomen is soft. There is no mass.      Tenderness: There is no guarding or rebound.   Musculoskeletal:         General: No swelling or tenderness. Normal range of motion.      Cervical back: Normal range of motion. No edema or erythema.      Right lower leg: No edema.      Left lower leg: No edema.   Skin:     General: Skin is warm and dry.      Coloration: Skin is not pale.      Findings: No erythema or rash.      Comments: Multiple tattoos.   Neurological:      General: No focal deficit present.      Mental Status: She is alert and oriented to person, place, and time.      Motor: No abnormal muscle tone.      Deep Tendon Reflexes: Reflexes are normal and symmetric.   Psychiatric:         Behavior: Behavior normal.         Thought Content: Thought content normal.         Judgment: Judgment normal.       Significant Labs: All pertinent labs within the past 24 hours have been reviewed.  BMP:   Recent Labs   Lab 03/06/23  0336   *      K 3.5      CO2 26   BUN 13   CREATININE 0.5   CALCIUM 9.2   MG 1.7       CBC:   Recent Labs   Lab 03/06/23  0336   WBC 5.34   HGB 10.9*   HCT 34.3*            Significant Imaging: I have reviewed all pertinent imaging results/findings within the past 24 hours.
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
## 2023-03-07 NOTE — ASSESSMENT & PLAN NOTE
She was admitted to [facility] with the same 1 month ago and left AMA   -No new precipitating event such as infection, etc., noted  Case was discussed with Endocrinology on call  They recommended the following interventions:   1) PTU loading dose 400mg, then 200mg Q4 hours   2) Propranolol 60-80mg po Q6 hours   3) Hydrocortisone 100mg iv q8 hours   4) SSKI drops, 3-4 drops po 3-4 times per day  -TSH, FT4, T3, thyrotropin receptor antibody  -Improving.  Resolution of fevers and tachycardia.  Improving diarrhea.    -Discussed with Endocrinology 03/06: recommended to change PTU to methimazole 20 mg b.i.d., discontinue propranolol and restart atenolol 25 mg daily in the a.m. , start weaning IV hydrocortisone to 50 mg b.i.d. 03/07 and then 25 mg b.i.d. after that.  Okay to discontinue the eyedrops.  Recommend repeating T3 and T4 level.Patient can likely discharge after tapering of hydrocortisone IV  -Changed to IV hydrocortisone to 50 mg b.i.d. 03/07 and then 25 mg b.i.d. for 03/08, and discontinued on 03/09. Orders placed.   -T3 now WNL  and free T4 2.48 , down trending
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
## 2023-03-08 VITALS
OXYGEN SATURATION: 98 % | HEART RATE: 78 BPM | TEMPERATURE: 98 F | WEIGHT: 116.88 LBS | BODY MASS INDEX: 21.51 KG/M2 | HEIGHT: 62 IN | SYSTOLIC BLOOD PRESSURE: 133 MMHG | DIASTOLIC BLOOD PRESSURE: 80 MMHG | RESPIRATION RATE: 18 BRPM

## 2023-03-08 LAB
FAT STL QL: NORMAL
NEUTRAL FAT STL QL: NORMAL
POCT GLUCOSE: 172 MG/DL (ref 70–110)
T4 FREE SERPL-MCNC: 1.95 NG/DL (ref 0.71–1.51)
TSH SERPL DL<=0.005 MIU/L-ACNC: <0.01 UIU/ML (ref 0.4–4)

## 2023-03-08 PROCEDURE — 84480 ASSAY TRIIODOTHYRONINE (T3): CPT | Performed by: STUDENT IN AN ORGANIZED HEALTH CARE EDUCATION/TRAINING PROGRAM

## 2023-03-08 PROCEDURE — 36415 COLL VENOUS BLD VENIPUNCTURE: CPT | Performed by: STUDENT IN AN ORGANIZED HEALTH CARE EDUCATION/TRAINING PROGRAM

## 2023-03-08 PROCEDURE — 63600175 PHARM REV CODE 636 W HCPCS: Performed by: STUDENT IN AN ORGANIZED HEALTH CARE EDUCATION/TRAINING PROGRAM

## 2023-03-08 PROCEDURE — 25000003 PHARM REV CODE 250: Performed by: HOSPITALIST

## 2023-03-08 PROCEDURE — 84436 ASSAY OF TOTAL THYROXINE: CPT | Performed by: STUDENT IN AN ORGANIZED HEALTH CARE EDUCATION/TRAINING PROGRAM

## 2023-03-08 PROCEDURE — 84443 ASSAY THYROID STIM HORMONE: CPT | Performed by: STUDENT IN AN ORGANIZED HEALTH CARE EDUCATION/TRAINING PROGRAM

## 2023-03-08 PROCEDURE — 25000003 PHARM REV CODE 250: Performed by: STUDENT IN AN ORGANIZED HEALTH CARE EDUCATION/TRAINING PROGRAM

## 2023-03-08 PROCEDURE — 84439 ASSAY OF FREE THYROXINE: CPT | Performed by: STUDENT IN AN ORGANIZED HEALTH CARE EDUCATION/TRAINING PROGRAM

## 2023-03-08 PROCEDURE — S4991 NICOTINE PATCH NONLEGEND: HCPCS | Performed by: HOSPITALIST

## 2023-03-08 RX ORDER — METHIMAZOLE 10 MG/1
10 TABLET ORAL
Qty: 180 TABLET | Refills: 0 | Status: ON HOLD | OUTPATIENT
Start: 2023-03-08 | End: 2023-06-17 | Stop reason: SDUPTHER

## 2023-03-08 RX ORDER — ATENOLOL 25 MG/1
25 TABLET ORAL 2 TIMES DAILY
Qty: 180 TABLET | Refills: 3 | Status: ON HOLD | OUTPATIENT
Start: 2023-03-08 | End: 2023-06-17 | Stop reason: SDUPTHER

## 2023-03-08 RX ORDER — IBUPROFEN 200 MG
1 TABLET ORAL DAILY
Qty: 84 PATCH | Refills: 0 | Status: ON HOLD | OUTPATIENT
Start: 2023-03-08 | End: 2023-06-17 | Stop reason: HOSPADM

## 2023-03-08 RX ORDER — ESCITALOPRAM OXALATE 5 MG/1
5 TABLET ORAL DAILY
Qty: 90 TABLET | Refills: 3 | Status: ON HOLD | OUTPATIENT
Start: 2023-03-09 | End: 2023-06-17 | Stop reason: SDUPTHER

## 2023-03-08 RX ADMIN — ESCITALOPRAM 5 MG: 5 TABLET, FILM COATED ORAL at 09:03

## 2023-03-08 RX ADMIN — ATENOLOL 25 MG: 25 TABLET ORAL at 09:03

## 2023-03-08 RX ADMIN — Medication 1 PATCH: at 09:03

## 2023-03-08 RX ADMIN — METHIMAZOLE 20 MG: 5 TABLET ORAL at 09:03

## 2023-03-08 RX ADMIN — OXYCODONE HYDROCHLORIDE 5 MG: 5 TABLET ORAL at 12:03

## 2023-03-08 RX ADMIN — HYDROCORTISONE SODIUM SUCCINATE 25 MG: 100 INJECTION, POWDER, FOR SOLUTION INTRAMUSCULAR; INTRAVENOUS at 09:03

## 2023-03-08 NOTE — DISCHARGE SUMMARY
"[facility] Medicine  Discharge Summary      Patient Name: Morgan Caro  MRN: [id number]  AIYANA: [id number]  Patient Class: IP- Inpatient  Admission Date: 3/3/2023  Hospital Length of Stay: 4 days  Discharge Date and Time:  03/08/2023 11:59 AM  Attending Physician: Jaxon Alberts MD   Discharging Provider: Jaxon Alberts MD  Primary Care Provider: [facility]  [place]    Primary Care Team: Networked reference to record PCT     HPI:   Ms Caro is a 44 yo female with a past medical history of migraine headaches and HTN.    Pt reports she was recently admitted approx 1 month ago at [facility] for 2 days for similar presentations but left AMA due to pain from the IV ("I just didn't want to get stuck anymore.") She states she was previously admitted because "I had a bad ultrasound... my thyroid was big." She endorses persistent palpitations, difficulty swallowing secondary to throat pain, increased agitation, foot swelling, intermittent fever, chills, and shortness of breath worse on exertion for the last month. Notes associated decreased P/O intake secondary to throat pain and "bad" diarrhea which began several days ago. No nausea or vomiting. Her current symptoms feel similar to the symptoms prior to her last admission. Of note, reports ongoing goiter which has been chronic for months. She reports family thyroid history, stating her cousin has hyperthyroidism. Denies rash, pain elsewhere, and other somatic complaints.     In the ED, her VS were BP (!) 164/100   Pulse 110   Temp 98.5 °F (36.9 °C) (Oral)   Resp 12   Wt 59 kg (130 lb)   LMP 04/26/2022   SpO2 96%   BMI 23.78 kg/m²     In the ED, she was treated with   Medications   vancomycin - pharmacy to dose (has no administration in time range)   vancomycin 1.5 g in dextrose 5 % 250 mL IVPB (ready to mix) (1,500 mg Intravenous New Bag 3/4/23 0018)   vancomycin (VANCOCIN) 1,000 mg in dextrose 5 % (D5W) 250 mL IVPB (Vial-Mate) (1,000 mg " Intravenous Trough Due As Scheduled Before Dose 3/5/23 1130)   hydrocortisone sodium succinate injection 100 mg (has no administration in time range)   propranoloL tablet 60 mg (has no administration in time range)   acetaminophen tablet 1,000 mg (1,000 mg Oral Given 3/3/23 2126)   sodium chloride 0.9% bolus 1,770 mL 1,770 mL (0 mLs Intravenous Stopped 3/4/23 0028)   piperacillin-tazobactam (ZOSYN) 4.5 g in dextrose 5 % in water (D5W) 5 % 100 mL IVPB (MB+) (0 g Intravenous Stopped 3/4/23 0028)   ondansetron injection 8 mg (8 mg Intravenous Given 3/3/23 2303)   iohexoL (OMNIPAQUE 350) injection 70 mL (70 mLs Intravenous Given 3/3/23 2340)   propranoloL injection 1 mg (1 mg Intravenous Given 3/4/23 0050)   propylthiouraciL tablet 400 mg (400 mg Oral Given 3/4/23 0050)             * No surgery found *      Hospital Course:   43-year-old female with history of hypertension admitted to ICU on 03/04/2023 with thyrotoxic crisis. TSH <0.010 and free T4 >5.0. US thyroid shows thyroid appears enlarged and heterogeneous with increased vascularity and positive for lymph nodes. CT abd showed no acute abdominal or pelvic pathology. Echo w/EF 55% and pHTN. Endocrinology at [facility] consulted. Patient monitored overnight in ICU and stepped down on 03/04.  Patient more hemodynamically stable and planned to transfer out of ICU.  She then became more confused, tangential and pacing the room crying uncontrollably.  Patient apparently expressed thoughts that she wanted to die to nurse.  She was PEC and Psych consulted.  Per Tele Psych, patient is not at imminent danger to self, or to others, and is not gravely disabled, as a result of mental illness and ok to rescind PEC.  Delirium episode now resolved. Tapering medications per endocrinology recs. Can discharge 3/9/23 once taper complete.     Patient demanded to leave today. Refused labs. Able to convince to get labs, will give meds at d/c. She will need external Endocrine referral  as she has medicaid.         Information for overactive thyroid in discharge instructions   Methimazole:Take 1 pill twice daily for 1 week, then reduce to 1 pill once daily (on bottle)   Follow up with Endocrinologist and PCP asap   Follow up with Cardiology in 1-3 months    Thank you for involving me with your care, let me know if there is anything more I can do!        Jaxon Alberts MD  Internal Medicine Staff        PATIENT GENERAL DISCHARGE INFORMATION   Things that YOU are responsible for to Manage Your Care At Home:  1. Getting your prescriptions filled.  2. Taking you medications as directed. (DO NOT MISS ANY DOSES!)  3. Going to your follow-up doctor appointments.                 *This is important because it allows the doctor to monitor your progress and make changes.       If you are unable to make your follow up appointments, please call the number listed and reschedule this appointment.    After discharge, if you need assistance, you can call [facility] On Call Nurse Care Line for 24/7 assistance at [phone]   If you are experience any signs or symptoms, Call your doctor or Call 911 and come to your nearest Emergency Room.    You should receive a call from [facility] within 48-72 hours to help manage your care after discharge.   Please try to make sure that you answer your phone for this important phone call.           [facility] Outpatient Clinics  - [facility]: [address], [phone]  - [facility]: [address] [facility], [phone]  - [facility]: [address] [facility], [phone]  - [facility]: [address] [facility], [phone]  - [facility] ([facility]): [address], [phone]  - [facility] ([facility]): [address] [facility] Jeanine Harvey, [phone]  - Rhianna Islas [facility]): [phone]  - [facility] [phone]     [facility] and  Private Outpatient Clinics  - [facility]: [address] 4th floor, [phone]  - [facility]: [address], 3rd Floor)[facility], [phone] (first visit is $80, subsequent visits are $40)  - [facility]: [address] [facility], [phone], [phone]  - [facility]: [address], [address], [phone]    Outpatient Group Therapy  - Cancer Support Group: [facility], [address] [facility], Shaheen Rojas, [phone]  - Depression/Bipolar Support [place]: [facility], [address], [place], 7:30pm 1st & 3rd Tuesdays in cafeteria, [phone]  - Heart Transplant Support Group: [facility], 3rd Wednesday, 7th floor conference room, Isis Canada, [phone]  - [facility] [place] [facility] ([facility]): [address] [place] [address] [place], [phone]  - [facility]: [facility] room [address], Kalina Ordonez, [phone]    Outpatient Drug Rehab   - [facility]: [address], 4th Floor, Fengemy Hoffman, [phone]  - Alcoholics Anonymous: www.aa-[place].org, 24 Hr Hotline:939.746.7524, Main: [phone]  - [facility]: [address] [facility], [phone], Tuesdays at 10am, Garett Myrick (ext. [phone])  - [facility]stance Abuse Clinic: [address], [phone]  - [facility]: [address], [phone]  - [facility]: [address] [place] [address], [phone]    Inpatient Drug Rehab  - [facility]: [address] [facility], [phone]   - [facility]: [address] [facility], [phone]   - [facility]: [address], [place], [phone]  - [facility] Program: [phone]  - [facility] <females only> (United [employer] Agency): [address] [facility], [phone], ext [phone], Nadeen Perez  [facility] <Fee based>:  [address] [place] [facility], [phone]    Elderly Services:  - Adult Protective Services: [phone]  - Bereavement Support Group, [facility],  & 3rd Tues, [address] [facility], [phone],  Tobi Pedroza, Ms Ck, therapists  - Case Management for Seniors [facility] Services- [address], [place] [address] - Call [phone]   - [facility] on [address], [phone]  - [facility] -. [address], [place] [address] [phone] ext [phone]   - [facility]:. - [address] [facility], [address], [place] [address] - Call [phone]   - [place] [facility] on Aging: Information on Aging Services - [address] [facility], [address], [place] [address] - call [phone]     Alzheimer's Services:   - Alzheimer's [facility] : - [address] [place], [place] [address] - Call [phone]   - Alzheimer's Adult care at the [facility] and [place], [place]. -  call [phone].  - [facility]'s [facility]: Provides current information on services available. Call [phone]     Mental Retardation Services:   - [facility] Retarded Citizens, Inc.:- Call [phone]     Female Services:  - Battered Women's Hotline: [phone],   - [facility] Battered Womens Shelter: [address] [address], [facility], [address], Tele: [phone]  - Rape Hotline: [phone],     Child Outpatient Psychiatry  - [facility]: [address] [facility], [address], [phone]  - [facility]: [address], [address] Ferrera, [phone], [phone]    Placement/Shelters:    - [facility] Services: - [address], [place] [address] - Call [phone]   - [facility], Inc.:  Medicaid funded, call [phone]   - [facility] Housing: family transitional housing, [address] [place] [address]  call for intake appt, [phone],   - [facility] Program, Outreach and Housing Placement, [address] [place],  Sister Mirna Krueger MSW, [phone]  - [facility]: [address] [facility], [phone]  - [facility] (16-24 year old): [address] [facility], [phone]  - [facility]: [address] [facility]  [phone]  - [facility], [address] [phone]  - [facility] Family Shelter (women only): [address] [facility], [phone]    HIV/AIDS Placement:  - AIDS Housing / Shelter: [address], [address], [place] [address] - call: [phone] or [phone]  - [facility], Call [phone]   - [facility] [phone]    Health Clinics / Dental Clinics / Indigent Pharmacy  - [facility]: [address] [facility], [phone]  - [facility]: [address] [facility], [phone], Mon-Thur 9am-4pm, Fri 9am-12pm  - [facility] STD Clinic: [address] [place] [address], [phone]  - [facility]: [address], [phone], fax [phone]  - [facility] (dental): [address] [place] [address] [phone]  - [facility] Office of Public [facility] STD Clinic, [address] [place] [address], [phone]  - [facility] (dental): [address] [place] [address], [address]  - [facility] (Pharmacy): [address], [address], ([facility]), [phone], Mon/Wed 8am-1pm    's Offices:  - [facility] 's Office: Dr. Hicks, [phone], [phone]  [facility] 's Office: Dr Octavio Bhat: [phone]    Crisis  - [facility] Hotline, [phone]  - National Suicide Prevention Crisis Hotline: 827.172.8695    Goals of Care Treatment Preferences:  Code Status: Full Code      Consults:   Consults (From admission, onward)        Status Ordering Provider     IP consult to case management  Once        Provider:  (Not yet assigned)    OSCAR Andersen     Inpatient consult to Psychiatry  Once        Provider:  Moi Randle,  MD    Completed CALLY ROSAS     Inpatient consult to Endocrinology  Once        Provider:  Marilyn Peguero MD    Acknowledged OSCAR SHAY          No new Assessment & Plan notes have been filed under this hospital service since the last note was generated.  Service: Hospital Medicine    Final Active Diagnoses:    Diagnosis Date Noted POA    PRINCIPAL PROBLEM:  Thyrotoxicosis with thyrotoxic crisis [E05.91] 03/04/2023 Yes    Advanced care planning/counseling discussion [Z71.89] 03/06/2023 Not Applicable    Acute delirium [R41.0] 03/05/2023 No    Primary hypertension [I10] 03/04/2023 Yes    Cardiomegaly [I51.7] 03/04/2023 Yes    Hypokalemia [E87.6] 03/04/2023 Yes      Problems Resolved During this Admission:       Discharged Condition: good    Disposition: Home or Self Care    Follow Up:   Follow-up Information     [facility]. Call.    Why: To schedule appointment within 7 days.  Contact information:  [address] [id number] [phone]                       Patient Instructions:      Ambulatory referral/consult to Internal Medicine   Standing Status: Future   Referral Priority: Urgent Referral Type: Consultation   Referral Reason: Specialty Services Required   Requested Specialty: Internal Medicine   Number of Visits Requested: 1     Ambulatory referral/consult to Endocrinology   Standing Status: Future   Referral Priority: Urgent Referral Type: Consultation   Requested Specialty: Endocrinology   Number of Visits Requested: 1     Ambulatory referral/consult to Cardiology   Standing Status: Future   Referral Priority: Routine Referral Type: Consultation   Referral Reason: Specialty Services Required   Requested Specialty: Cardiology   Number of Visits Requested: 1         Recent Results (from the past 100 hour(s))   Echo Saline Bubble? No    Collection Time: 03/04/23  9:03 AM   Result Value Ref Range    BSA 1.53 m2    TDI SEPTAL 0.08 m/s    LV LATERAL E/E' RATIO 7.17 m/s     LV SEPTAL E/E' RATIO 10.75 m/s    LA WIDTH 5.80 cm    IVC diameter 25 cm    Left Ventricular Outflow Tract Mean Velocity 1.01 cm/s    Left Ventricular Outflow Tract Mean Gradient 4.39 mmHg    TDI LATERAL 0.12 m/s    PV PEAK VELOCITY 1.07 cm/s    LVIDd 4.95 3.5 - 6.0 cm    IVS 0.77 0.6 - 1.1 cm    Posterior Wall 0.87 0.6 - 1.1 cm    LVIDs 3.67 2.1 - 4.0 cm    FS 26 28 - 44 %    LA volume 143.92 cm3    Sinus 3.80 cm    STJ 2.97 cm    LV mass 137.80 g    LA size 4.57 cm    RVDD 4.26 cm    TAPSE 2.68 cm    RV S' 0.01 cm/s    Left Ventricle Relative Wall Thickness 0.35 cm    AV mean gradient 5 mmHg    AV valve area 3.30 cm2    AV Velocity Ratio 1.02     AV index (prosthetic) 0.95     MV valve area p 1/2 method 6.13 cm2    E/A ratio 1.56     Mean e' 0.10 m/s    E wave deceleration time 123.84 msec    LVOT diameter 2.10 cm    LVOT area 3.5 cm2    LVOT peak marc 1.37 m/s    LVOT peak VTI 20.90 cm    Ao peak marc 1.34 m/s    Ao VTI 21.9 cm    LVOT stroke volume 72.35 cm3    AV peak gradient 7 mmHg    E/E' ratio 8.60 m/s    MV Peak E Marc 0.86 m/s    TR Max Marc 3.37 m/s    MV stenosis pressure 1/2 time 35.91 ms    MV Peak A Marc 0.55 m/s    LV Systolic Volume 57.13 mL    LV Systolic Volume Index 37.6 mL/m2    LV Diastolic Volume 115.59 mL    LV Diastolic Volume Index 76.05 mL/m2    LA Volume Index 94.7 mL/m2    LV Mass Index 91 g/m2    RA Major Axis 5.33 cm    Left Atrium Minor Axis 5.86 cm    Left Atrium Major Axis 7.02 cm    Triscuspid Valve Regurgitation Peak Gradient 45 mmHg    RA Width 4.80 cm    Right Atrial Pressure (from IVC) 15 mmHg    EF 55 %    TV rest pulmonary artery pressure 60 mmHg   Comprehensive Metabolic Panel (CMP)    Collection Time: 03/05/23  4:12 AM   Result Value Ref Range    Sodium 141 136 - 145 mmol/L    Potassium 4.2 3.5 - 5.1 mmol/L    Chloride 107 95 - 110 mmol/L    CO2 24 23 - 29 mmol/L    Glucose 234 (H) 70 - 110 mg/dL    BUN 12 6 - 20 mg/dL    Creatinine 0.5 0.5 - 1.4 mg/dL    Calcium 9.9 8.7 -  10.5 mg/dL    Total Protein 6.1 6.0 - 8.4 g/dL    Albumin 2.8 (L) 3.5 - 5.2 g/dL    Total Bilirubin 0.5 0.1 - 1.0 mg/dL    Alkaline Phosphatase 240 (H) 55 - 135 U/L    AST 18 10 - 40 U/L    ALT 19 10 - 44 U/L    Anion Gap 10 8 - 16 mmol/L    eGFR >60 >60 mL/min/1.73 m^2   Magnesium    Collection Time: 03/05/23  4:12 AM   Result Value Ref Range    Magnesium 1.5 (L) 1.6 - 2.6 mg/dL   Phosphorus    Collection Time: 03/05/23  4:12 AM   Result Value Ref Range    Phosphorus 3.3 2.7 - 4.5 mg/dL   CBC with Automated Differential    Collection Time: 03/05/23  4:12 AM   Result Value Ref Range    WBC 6.62 3.90 - 12.70 K/uL    RBC 4.45 4.00 - 5.40 M/uL    Hemoglobin 10.3 (L) 12.0 - 16.0 g/dL    Hematocrit 32.2 (L) 37.0 - 48.5 %    MCV 72 (L) 82 - 98 fL    MCH 23.1 (L) 27.0 - 31.0 pg    MCHC 32.0 32.0 - 36.0 g/dL    RDW 14.6 (H) 11.5 - 14.5 %    Platelets 122 (L) 150 - 450 K/uL    MPV 10.9 9.2 - 12.9 fL    Immature Granulocytes 0.2 0.0 - 0.5 %    Gran # (ANC) 5.1 1.8 - 7.7 K/uL    Immature Grans (Abs) 0.01 0.00 - 0.04 K/uL    Lymph # 1.0 1.0 - 4.8 K/uL    Mono # 0.5 0.3 - 1.0 K/uL    Eos # 0.0 0.0 - 0.5 K/uL    Baso # 0.00 0.00 - 0.20 K/uL    nRBC 0 0 /100 WBC    Gran % 76.7 (H) 38.0 - 73.0 %    Lymph % 15.7 (L) 18.0 - 48.0 %    Mono % 7.4 4.0 - 15.0 %    Eosinophil % 0.0 0.0 - 8.0 %    Basophil % 0.0 0.0 - 1.9 %    Differential Method Automated    POCT glucose    Collection Time: 03/05/23  6:53 PM   Result Value Ref Range    POCT Glucose 204 (H) 70 - 110 mg/dL   Comprehensive Metabolic Panel (CMP)    Collection Time: 03/06/23  3:36 AM   Result Value Ref Range    Sodium 138 136 - 145 mmol/L    Potassium 3.5 3.5 - 5.1 mmol/L    Chloride 103 95 - 110 mmol/L    CO2 26 23 - 29 mmol/L    Glucose 170 (H) 70 - 110 mg/dL    BUN 13 6 - 20 mg/dL    Creatinine 0.5 0.5 - 1.4 mg/dL    Calcium 9.2 8.7 - 10.5 mg/dL    Total Protein 6.2 6.0 - 8.4 g/dL    Albumin 2.9 (L) 3.5 - 5.2 g/dL    Total Bilirubin 0.5 0.1 - 1.0 mg/dL    Alkaline  Phosphatase 234 (H) 55 - 135 U/L    AST 22 10 - 40 U/L    ALT 21 10 - 44 U/L    Anion Gap 9 8 - 16 mmol/L    eGFR >60 >60 mL/min/1.73 m^2   Magnesium    Collection Time: 03/06/23  3:36 AM   Result Value Ref Range    Magnesium 1.7 1.6 - 2.6 mg/dL   Phosphorus    Collection Time: 03/06/23  3:36 AM   Result Value Ref Range    Phosphorus 3.6 2.7 - 4.5 mg/dL   CBC with Automated Differential    Collection Time: 03/06/23  3:36 AM   Result Value Ref Range    WBC 5.34 3.90 - 12.70 K/uL    RBC 4.74 4.00 - 5.40 M/uL    Hemoglobin 10.9 (L) 12.0 - 16.0 g/dL    Hematocrit 34.3 (L) 37.0 - 48.5 %    MCV 72 (L) 82 - 98 fL    MCH 23.0 (L) 27.0 - 31.0 pg    MCHC 31.8 (L) 32.0 - 36.0 g/dL    RDW 14.0 11.5 - 14.5 %    Platelets 152 150 - 450 K/uL    MPV 11.0 9.2 - 12.9 fL    Immature Granulocytes 0.4 0.0 - 0.5 %    Gran # (ANC) 3.7 1.8 - 7.7 K/uL    Immature Grans (Abs) 0.02 0.00 - 0.04 K/uL    Lymph # 1.4 1.0 - 4.8 K/uL    Mono # 0.3 0.3 - 1.0 K/uL    Eos # 0.0 0.0 - 0.5 K/uL    Baso # 0.00 0.00 - 0.20 K/uL    nRBC 0 0 /100 WBC    Gran % 68.7 38.0 - 73.0 %    Lymph % 25.5 18.0 - 48.0 %    Mono % 5.4 4.0 - 15.0 %    Eosinophil % 0.0 0.0 - 8.0 %    Basophil % 0.0 0.0 - 1.9 %    Differential Method Automated    POCT glucose    Collection Time: 03/06/23  8:59 AM   Result Value Ref Range    POCT Glucose 144 (H) 70 - 110 mg/dL   T4, free    Collection Time: 03/06/23 10:09 AM   Result Value Ref Range    Free T4 2.48 (H) 0.71 - 1.51 ng/dL   T3    Collection Time: 03/06/23 10:09 AM   Result Value Ref Range    T3, Total 63 60 - 180 ng/dL   POCT glucose    Collection Time: 03/06/23  5:03 PM   Result Value Ref Range    POCT Glucose 100 70 - 110 mg/dL   POCT glucose    Collection Time: 03/06/23  7:45 PM   Result Value Ref Range    POCT Glucose 135 (H) 70 - 110 mg/dL   POCT glucose    Collection Time: 03/07/23  7:29 AM   Result Value Ref Range    POCT Glucose 93 70 - 110 mg/dL   POCT glucose    Collection Time: 03/07/23 11:04 AM   Result Value Ref  Range    POCT Glucose 135 (H) 70 - 110 mg/dL   POCT glucose    Collection Time: 03/07/23  4:37 PM   Result Value Ref Range    POCT Glucose 130 (H) 70 - 110 mg/dL   POCT glucose    Collection Time: 03/07/23  7:51 PM   Result Value Ref Range    POCT Glucose 141 (H) 70 - 110 mg/dL       Microbiology Results (last 7 days)     Procedure Component Value Units Date/Time    Blood culture x two cultures. Draw prior to antibiotics. [[id number]] Collected: 03/03/23 2208    Order Status: Completed Specimen: Blood from Peripheral, Antecubital, Left Updated: 03/07/23 2303     Blood Culture, Routine No Growth after 4 days.    Narrative:      Aerobic and anaerobic    Blood culture x two cultures. Draw prior to antibiotics. [[id number]] Collected: 03/03/23 2200    Order Status: Completed Specimen: Blood from Peripheral, Hand, Right Updated: 03/07/23 2303     Blood Culture, Routine No Growth after 4 days.    Narrative:      Aerobic and anaerobic    Stool culture [[id number]] Collected: 03/04/23 0029    Order Status: Completed Specimen: Stool Updated: 03/07/23 0606     Stool Culture No Salmonella,Shigella,Vibrio,Campylobacter,Yersinia isolated.    E. coli 0157 antigen [[id number]] Collected: 03/04/23 0029    Order Status: Completed Specimen: Stool Updated: 03/06/23 1017     Shiga Toxin 1 E.coli Negative     Shiga Toxin 2 E.coli Negative    Clostridium difficile EIA [[id number]] Collected: 03/04/23 0029    Order Status: Completed Specimen: Stool Updated: 03/04/23 1259     C. diff Antigen Negative     C difficile Toxins A+B, EIA Negative     Comment: Testing not recommended for children <24 months old.             Imaging Results          US Soft Tissue Head Neck Thyroid (Final result)  Result time 03/04/23 03:12:35    Final result by Hermilo William MD (03/04/23 03:12:35)                 Impression:      The thyroid appears enlarged and heterogeneous with increased vascularity diffusely throughout, as discussed above.    Suspected lymph  nodes of the neck noted, as above.      Electronically signed by: Hermilo William  Date:    03/04/2023  Time:    03:12             Narrative:    EXAMINATION:  US SOFT TISSUE HEAD NECK THYROID    CLINICAL HISTORY:  thyroid storm;    TECHNIQUE:  Ultrasound of the thyroid and cervical lymph nodes was performed.    COMPARISON:  None.    FINDINGS:  The right thyroid lobe measures approximately 6.5 by 3.9 x 3 cm in size.  The left thyroid lobe measures approximately 2.9 x 3.3 x 6.6 cm in size.  The isthmus measures approximately 1.7 cm in the anterior to posterior dimension.  The thyroid appears diffusely heterogeneous and enlarged, with increased vascularity.    At the level of the right lateral neck there is a sonographic finding measuring approximately 9.1 x 6.1 by 15.9 mm in size, this may relate to a lymph node.  Similarly within the left neck there is a finding measuring approximately 12.6 x 5.6 by 34.3 mm likely relating to a lymph node.                               CT Abdomen Pelvis With Contrast (Final result)  Result time 03/04/23 00:06:19    Final result by Nalini Benitez MD (03/04/23 00:06:19)                 Impression:      No acute abdominal or pelvic pathology CT of the abdomen and pelvis with contrast.    Mild cardiomegaly.      Electronically signed by: Nalini Benitez  Date:    03/04/2023  Time:    00:06             Narrative:    EXAMINATION:  CT OF ABDOMEN PELVIS WITH    CLINICAL HISTORY:  Abdominal abscess/infection suspected;    TECHNIQUE:  5 mm enhanced axial images were obtained from the lung bases through the greater trochanters.  Seventy mL of Omnipaque 350 was injected.    COMPARISON:  05/08/2019    FINDINGS:  The liver, spleen, pancreas, kidneys, and adrenal glands are unremarkable. The gallbladder contains no calcified gallstones.    There is no definite evidence for abdominal adenopathy or ascites.  There is mild prominence of the retroperitoneal lymph nodes.    There are no pelvic  masses or adenopathy.  The uterus is retroverted.  The appendix is not inflamed.    There is no free fluid in the pelvis.    At the lung bases, the heart is enlarged.  There is moderate bibasilar atelectasis.                               X-Ray Chest AP Portable (Final result)  Result time 03/03/23 22:37:06    Final result by Facundo De Jesus MD (03/03/23 22:37:06)                 Impression:      No acute findings.      Electronically signed by: Facundo De Jesus  Date:    03/03/2023  Time:    22:37             Narrative:    EXAMINATION:  XR CHEST AP PORTABLE    CLINICAL HISTORY:  Sepsis;    TECHNIQUE:  Single frontal view of the chest was performed.    COMPARISON:  08/12/2021    FINDINGS:  Lungs are clear.  No focal consolidation, pleural fluid, or pneumothorax.  Normal heart size.                                    Pending Diagnostic Studies:     Procedure Component Value Units Date/Time    Calprotectin, Stool [[id number]] Collected: 03/04/23 0007    Order Status: Sent Lab Status: In process Updated: 03/04/23 0016    Specimen: Stool     Stool Exam-Ova,Cysts,Parasites [[id number]] Collected: 03/04/23 0007    Order Status: Sent Lab Status: In process Updated: 03/04/23 0016    Specimen: Stool     T3 [[id number]]     Order Status: Sent Lab Status: No result     Specimen: Blood     T4 [[id number]]     Order Status: Sent Lab Status: No result     Specimen: Blood     TSH [[id number]]     Order Status: Sent Lab Status: No result     Specimen: Blood          Medications:  Reconciled Home Medications:      Medication List      START taking these medications    EScitalopram oxalate 5 MG Tab  Commonly known as: LEXAPRO  Take 1 tablet (5 mg total) by mouth once daily.  Start taking on: March 9, 2023     methIMAzole 10 MG Tab  Commonly known as: TAPAZOLE  Take 1 tablet (10 mg total) by mouth before breakfast. Take one pill twice daily for 1 week (20 mg total) , then reduce to one pill daily (10 mg total). Go to  Endocrinologist for adjustments asap.        CONTINUE taking these medications    atenoloL 25 MG tablet  Commonly known as: TENORMIN  Take 1 tablet (25 mg total) by mouth once daily.        STOP taking these medications    acetaminophen 500 MG tablet  Commonly known as: TYLENOL EXTRA STRENGTH     ibuprofen 600 MG tablet  Commonly known as: ADVIL,MOTRIN     ondansetron 4 MG Tbdl  Commonly known as: ZOFRAN-ODT            Indwelling Lines/Drains at time of discharge:   Lines/Drains/Airways     None                 Time spent on the discharge of patient: 35 minutes         Jaxon Alberts MD  Department of [facility] Medicine  [facility] [place] [facility]

## 2023-03-08 NOTE — PLAN OF CARE
[place] - Telemetry  Discharge Final Note    Primary Care Provider: [facility] [place]    Expected Discharge Date: 3/8/2023    All needs met. Patient to schedule follow up appointments. Ambulatory referrals sent for Cardiology and Endcrinology. SW notified nurse Lisa that patient is ready for discharge from case management standpoint.     Final Discharge Note (most recent)       Final Note - 03/08/23 1234          Final Note    Assessment Type Final Discharge Note     Anticipated Discharge Disposition Home or Self Care     What phone number can be called within the next 1-3 days to see how you are doing after discharge? [phone]     Hospital Resources/Appts/Education Provided --   Patient to schedule follow up apppointment.       Post-Acute Status    Coverage Medicaid     Discharge Delays None known at this time                     Important Message from Medicare             Contact Info       [facility] [place]   Relationship: PCP - General    [address] [place] [address]   Phone: [phone]       Next Steps: Call    Instructions: To schedule appointment within 7 days. Please ask PCP to refer you to Endocrinologist, and Cardilogist.

## 2023-03-08 NOTE — PROGRESS NOTES
Patient resting in bed with no acute distress noted.  Will continue to monitor.  Call bell in reach.

## 2023-03-08 NOTE — NURSING
Patient is discharged. Telemetry removed. IV removed, tip intact. Discharge instructions given and understood. No questions asked. Pharmacy delivered meds to bedside
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
## 2023-03-08 NOTE — SUBJECTIVE & OBJECTIVE
Interval History:    NAEON. Denies SOB or CP.   Eating okay.   No new issues  Anxious to go home      Review of Systems   Respiratory:  Negative for shortness of breath.    Cardiovascular:  Negative for chest pain.     Objective:     Vital Signs (Most Recent):  Temp: 98.4 °F (36.9 °C) (03/08/23 0744)  Pulse: 84 (03/08/23 0744)  Resp: 18 (03/08/23 0744)  BP: 132/78 (03/08/23 0744)  SpO2: 98 % (03/08/23 0744)   Vital Signs (24h Range):  Temp:  [98 °F (36.7 °C)-99.1 °F (37.3 °C)] 98.4 °F (36.9 °C)  Pulse:  [76-88] 84  Resp:  [17-20] 18  SpO2:  [94 %-98 %] 98 %  BP: (130-155)/(77-92) 132/78     Weight: 53 kg (116 lb 13.5 oz)  Body mass index is 21.37 kg/m².    Intake/Output Summary (Last 24 hours) at 3/8/2023 0916  Last data filed at 3/7/2023 1711  Gross per 24 hour   Intake 480 ml   Output --   Net 480 ml        Physical Exam  Vitals and nursing note reviewed.   Constitutional:       General: She is not in acute distress.     Appearance: She is well-developed. She is not diaphoretic.   HENT:      Head: Normocephalic and atraumatic.   Eyes:      General: No scleral icterus.  Neck:      Thyroid: No thyromegaly.   Cardiovascular:      Rate and Rhythm: Normal rate and regular rhythm.      Heart sounds: No murmur heard.  Pulmonary:      Effort: Pulmonary effort is normal.      Breath sounds: Normal breath sounds. No stridor. No wheezing or rales.   Abdominal:      General: There is no distension.      Palpations: Abdomen is soft. There is no mass.      Tenderness: There is no abdominal tenderness. There is no guarding.   Musculoskeletal:         General: Normal range of motion.      Cervical back: Normal range of motion and neck supple. No rigidity.      Right lower leg: No edema.      Left lower leg: No edema.   Lymphadenopathy:      Cervical: No cervical adenopathy.   Skin:     General: Skin is warm and dry.      Capillary Refill: Capillary refill takes less than 2 seconds.      Coloration: Skin is not jaundiced.       Findings: No bruising.   Neurological:      Mental Status: She is alert and oriented to person, place, and time. Mental status is at baseline.   Psychiatric:         Mood and Affect: Mood normal.         Behavior: Behavior normal.           Recent Results (from the past 24 hour(s))   POCT glucose    Collection Time: 03/07/23 11:04 AM   Result Value Ref Range    POCT Glucose 135 (H) 70 - 110 mg/dL   POCT glucose    Collection Time: 03/07/23  4:37 PM   Result Value Ref Range    POCT Glucose 130 (H) 70 - 110 mg/dL   POCT glucose    Collection Time: 03/07/23  7:51 PM   Result Value Ref Range    POCT Glucose 141 (H) 70 - 110 mg/dL       Microbiology Results (last 7 days)       Procedure Component Value Units Date/Time    Blood culture x two cultures. Draw prior to antibiotics. [[id number]] Collected: 03/03/23 2208    Order Status: Completed Specimen: Blood from Peripheral, Antecubital, Left Updated: 03/07/23 2303     Blood Culture, Routine No Growth after 4 days.    Narrative:      Aerobic and anaerobic    Blood culture x two cultures. Draw prior to antibiotics. [[id number]] Collected: 03/03/23 2200    Order Status: Completed Specimen: Blood from Peripheral, Hand, Right Updated: 03/07/23 2303     Blood Culture, Routine No Growth after 4 days.    Narrative:      Aerobic and anaerobic    Stool culture [[id number]] Collected: 03/04/23 0029    Order Status: Completed Specimen: Stool Updated: 03/07/23 0606     Stool Culture No Salmonella,Shigella,Vibrio,Campylobacter,Yersinia isolated.    E. coli 0157 antigen [[id number]] Collected: 03/04/23 0029    Order Status: Completed Specimen: Stool Updated: 03/06/23 1017     Shiga Toxin 1 E.coli Negative     Shiga Toxin 2 E.coli Negative    Clostridium difficile EIA [[id number]] Collected: 03/04/23 0029    Order Status: Completed Specimen: Stool Updated: 03/04/23 1259     C. diff Antigen Negative     C difficile Toxins A+B, EIA Negative     Comment: Testing not recommended for children <24  months old.                Imaging Results              US Soft Tissue Head Neck Thyroid (Final result)  Result time 03/04/23 03:12:35      Final result by Hermilo William MD (03/04/23 03:12:35)                   Impression:      The thyroid appears enlarged and heterogeneous with increased vascularity diffusely throughout, as discussed above.    Suspected lymph nodes of the neck noted, as above.      Electronically signed by: Hermilo William  Date:    03/04/2023  Time:    03:12               Narrative:    EXAMINATION:  US SOFT TISSUE HEAD NECK THYROID    CLINICAL HISTORY:  thyroid storm;    TECHNIQUE:  Ultrasound of the thyroid and cervical lymph nodes was performed.    COMPARISON:  None.    FINDINGS:  The right thyroid lobe measures approximately 6.5 by 3.9 x 3 cm in size.  The left thyroid lobe measures approximately 2.9 x 3.3 x 6.6 cm in size.  The isthmus measures approximately 1.7 cm in the anterior to posterior dimension.  The thyroid appears diffusely heterogeneous and enlarged, with increased vascularity.    At the level of the right lateral neck there is a sonographic finding measuring approximately 9.1 x 6.1 by 15.9 mm in size, this may relate to a lymph node.  Similarly within the left neck there is a finding measuring approximately 12.6 x 5.6 by 34.3 mm likely relating to a lymph node.                                       CT Abdomen Pelvis With Contrast (Final result)  Result time 03/04/23 00:06:19      Final result by Nalini Benitez MD (03/04/23 00:06:19)                   Impression:      No acute abdominal or pelvic pathology CT of the abdomen and pelvis with contrast.    Mild cardiomegaly.      Electronically signed by: Nalini Benitez  Date:    03/04/2023  Time:    00:06               Narrative:    EXAMINATION:  CT OF ABDOMEN PELVIS WITH    CLINICAL HISTORY:  Abdominal abscess/infection suspected;    TECHNIQUE:  5 mm enhanced axial images were obtained from the lung bases through the  greater trochanters.  Seventy mL of Omnipaque 350 was injected.    COMPARISON:  05/08/2019    FINDINGS:  The liver, spleen, pancreas, kidneys, and adrenal glands are unremarkable. The gallbladder contains no calcified gallstones.    There is no definite evidence for abdominal adenopathy or ascites.  There is mild prominence of the retroperitoneal lymph nodes.    There are no pelvic masses or adenopathy.  The uterus is retroverted.  The appendix is not inflamed.    There is no free fluid in the pelvis.    At the lung bases, the heart is enlarged.  There is moderate bibasilar atelectasis.                                       X-Ray Chest AP Portable (Final result)  Result time 03/03/23 22:37:06      Final result by Facundo De Jesus MD (03/03/23 22:37:06)                   Impression:      No acute findings.      Electronically signed by: Facundo De Jesus  Date:    03/03/2023  Time:    22:37               Narrative:    EXAMINATION:  XR CHEST AP PORTABLE    CLINICAL HISTORY:  Sepsis;    TECHNIQUE:  Single frontal view of the chest was performed.    COMPARISON:  08/12/2021    FINDINGS:  Lungs are clear.  No focal consolidation, pleural fluid, or pneumothorax.  Normal heart size.

## 2023-03-08 NOTE — NURSING
"Refusing lab draw, needs new IV refusing. States "im tired of getting stuck, I need to get out of here where is the doctor". Will inform MD  "
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
## 2023-03-08 NOTE — NURSING
Report received from off going nurse, SHARONA Awad. Patient AAO. No signs of distress noted. Call light in reach. Bed low and locked. Will continue plan of care.

## 2023-03-09 LAB
CALPROTECTIN STL-MCNT: <27.1 MCG/G
T3 SERPL-MCNC: 186 NG/DL (ref 60–180)
T4 SERPL-MCNC: 11.3 UG/DL (ref 4.5–11.5)

## 2023-03-13 LAB
H PYLORI AG STL QL IA: NOT DETECTED
SPECIMEN SOURCE: NORMAL

## 2023-03-14 LAB — O+P STL MICRO: NORMAL

## 2023-06-08 ENCOUNTER — HOSPITAL ENCOUNTER (OUTPATIENT)
Facility: HOSPITAL | Age: 43
Discharge: LEFT BEFORE DISCHARGE | End: 2023-06-12
Attending: EMERGENCY MEDICINE | Admitting: INTERNAL MEDICINE
Payer: MEDICAID

## 2023-06-08 DIAGNOSIS — E05.90 THYROTOXICOSIS: ICD-10-CM

## 2023-06-08 DIAGNOSIS — R07.9 CHEST PAIN: ICD-10-CM

## 2023-06-08 DIAGNOSIS — R00.0 TACHYCARDIA: ICD-10-CM

## 2023-06-08 DIAGNOSIS — K52.9 ENTERITIS: Primary | ICD-10-CM

## 2023-06-08 PROCEDURE — 12000002 HC ACUTE/MED SURGE SEMI-PRIVATE ROOM

## 2023-06-08 PROCEDURE — 96365 THER/PROPH/DIAG IV INF INIT: CPT

## 2023-06-08 PROCEDURE — 87502 INFLUENZA DNA AMP PROBE: CPT

## 2023-06-08 NOTE — Clinical Note
Diagnosis: Thyrotoxicosis [585252]   Admitting Provider:: TONO DUVALL [[id number]]   Future Attending Provider: DARRYN SYED [[id number]]   Reason for IP Medical Treatment  (Clinical interventions that can only be accomplished in the IP setting? ) :: thyrotoxicosis   I certify that Inpatient services for greater than or equal to 2 midnights are medically necessary:: Yes   Plans for Post-Acute care--if anticipated (pick the single best option):: A. No post acute care anticipated at this time

## 2023-06-09 PROBLEM — Z71.6 TOBACCO ABUSE COUNSELING: Status: ACTIVE | Noted: 2023-06-09

## 2023-06-09 PROBLEM — K52.9 ENTERITIS: Status: ACTIVE | Noted: 2023-06-09

## 2023-06-09 PROBLEM — G43.909 MIGRAINES: Status: ACTIVE | Noted: 2023-06-09

## 2023-06-09 PROBLEM — N39.0 ACUTE UTI: Status: ACTIVE | Noted: 2023-06-09

## 2023-06-09 LAB
ALBUMIN SERPL BCP-MCNC: 3.6 G/DL (ref 3.5–5.2)
ALP SERPL-CCNC: 403 U/L (ref 55–135)
ALT SERPL W/O P-5'-P-CCNC: 39 U/L (ref 10–44)
AMPHET+METHAMPHET UR QL: NEGATIVE
ANION GAP SERPL CALC-SCNC: 12 MMOL/L (ref 8–16)
ANION GAP SERPL CALC-SCNC: 13 MMOL/L (ref 8–16)
AST SERPL-CCNC: 36 U/L (ref 10–40)
B-HCG UR QL: NEGATIVE
BACTERIA #/AREA URNS HPF: ABNORMAL /HPF
BARBITURATES UR QL SCN>200 NG/ML: NEGATIVE
BASOPHILS # BLD AUTO: 0.02 K/UL (ref 0–0.2)
BASOPHILS NFR BLD: 0.4 % (ref 0–1.9)
BENZODIAZ UR QL SCN>200 NG/ML: NEGATIVE
BILIRUB SERPL-MCNC: 0.9 MG/DL (ref 0.1–1)
BILIRUB UR QL STRIP: NEGATIVE
BNP SERPL-MCNC: 123 PG/ML (ref 0–99)
BUN SERPL-MCNC: 15 MG/DL (ref 6–20)
BUN SERPL-MCNC: 19 MG/DL (ref 6–20)
BZE UR QL SCN: NEGATIVE
CALCIUM SERPL-MCNC: 9.7 MG/DL (ref 8.7–10.5)
CALCIUM SERPL-MCNC: 9.9 MG/DL (ref 8.7–10.5)
CANNABINOIDS UR QL SCN: ABNORMAL
CHLORIDE SERPL-SCNC: 101 MMOL/L (ref 95–110)
CHLORIDE SERPL-SCNC: 103 MMOL/L (ref 95–110)
CLARITY UR: CLEAR
CO2 SERPL-SCNC: 24 MMOL/L (ref 23–29)
CO2 SERPL-SCNC: 25 MMOL/L (ref 23–29)
COLOR UR: YELLOW
CREAT SERPL-MCNC: 0.5 MG/DL (ref 0.5–1.4)
CREAT SERPL-MCNC: 0.6 MG/DL (ref 0.5–1.4)
CREAT UR-MCNC: 84.4 MG/DL (ref 15–325)
CTP QC/QA: YES
DIFFERENTIAL METHOD: ABNORMAL
EOSINOPHIL # BLD AUTO: 0 K/UL (ref 0–0.5)
EOSINOPHIL NFR BLD: 0 % (ref 0–8)
ERYTHROCYTE [DISTWIDTH] IN BLOOD BY AUTOMATED COUNT: 13.8 % (ref 11.5–14.5)
EST. GFR  (NO RACE VARIABLE): >60 ML/MIN/1.73 M^2
EST. GFR  (NO RACE VARIABLE): >60 ML/MIN/1.73 M^2
GLUCOSE SERPL-MCNC: 97 MG/DL (ref 70–110)
GLUCOSE SERPL-MCNC: 99 MG/DL (ref 70–110)
GLUCOSE UR QL STRIP: NEGATIVE
HCT VFR BLD AUTO: 44.6 % (ref 37–48.5)
HGB BLD-MCNC: 14.3 G/DL (ref 12–16)
HGB UR QL STRIP: ABNORMAL
HYALINE CASTS #/AREA URNS LPF: 6 /LPF
IMM GRANULOCYTES # BLD AUTO: 0.01 K/UL (ref 0–0.04)
IMM GRANULOCYTES NFR BLD AUTO: 0.2 % (ref 0–0.5)
KETONES UR QL STRIP: ABNORMAL
LEUKOCYTE ESTERASE UR QL STRIP: NEGATIVE
LIPASE SERPL-CCNC: 28 U/L (ref 4–60)
LYMPHOCYTES # BLD AUTO: 1.2 K/UL (ref 1–4.8)
LYMPHOCYTES NFR BLD: 22 % (ref 18–48)
MAGNESIUM SERPL-MCNC: 1.6 MG/DL (ref 1.6–2.6)
MAGNESIUM SERPL-MCNC: 1.7 MG/DL (ref 1.6–2.6)
MCH RBC QN AUTO: 22.8 PG (ref 27–31)
MCHC RBC AUTO-ENTMCNC: 32.1 G/DL (ref 32–36)
MCV RBC AUTO: 71 FL (ref 82–98)
METHADONE UR QL SCN>300 NG/ML: NEGATIVE
MICROSCOPIC COMMENT: ABNORMAL
MONOCYTES # BLD AUTO: 0.6 K/UL (ref 0.3–1)
MONOCYTES NFR BLD: 11.2 % (ref 4–15)
NEUTROPHILS # BLD AUTO: 3.5 K/UL (ref 1.8–7.7)
NEUTROPHILS NFR BLD: 66.2 % (ref 38–73)
NITRITE UR QL STRIP: POSITIVE
NRBC BLD-RTO: 0 /100 WBC
OPIATES UR QL SCN: ABNORMAL
PCP UR QL SCN>25 NG/ML: NEGATIVE
PH UR STRIP: 7 [PH] (ref 5–8)
PHOSPHATE SERPL-MCNC: 5.2 MG/DL (ref 2.7–4.5)
PLATELET # BLD AUTO: 216 K/UL (ref 150–450)
PMV BLD AUTO: 11.2 FL (ref 9.2–12.9)
POC MOLECULAR INFLUENZA A AGN: NEGATIVE
POC MOLECULAR INFLUENZA B AGN: NEGATIVE
POCT GLUCOSE: 103 MG/DL (ref 70–110)
POCT GLUCOSE: 113 MG/DL (ref 70–110)
POCT GLUCOSE: 117 MG/DL (ref 70–110)
POCT GLUCOSE: 130 MG/DL (ref 70–110)
POCT GLUCOSE: 146 MG/DL (ref 70–110)
POTASSIUM SERPL-SCNC: 3.8 MMOL/L (ref 3.5–5.1)
POTASSIUM SERPL-SCNC: 4.2 MMOL/L (ref 3.5–5.1)
PROT SERPL-MCNC: 8 G/DL (ref 6–8.4)
PROT UR QL STRIP: ABNORMAL
RBC # BLD AUTO: 6.26 M/UL (ref 4–5.4)
RBC #/AREA URNS HPF: 1 /HPF (ref 0–4)
SARS-COV-2 RDRP RESP QL NAA+PROBE: NEGATIVE
SODIUM SERPL-SCNC: 137 MMOL/L (ref 136–145)
SODIUM SERPL-SCNC: 141 MMOL/L (ref 136–145)
SP GR UR STRIP: 1.02 (ref 1–1.03)
T4 FREE SERPL-MCNC: >5 NG/DL (ref 0.71–1.51)
TOXICOLOGY INFORMATION: ABNORMAL
TROPONIN I SERPL DL<=0.01 NG/ML-MCNC: <0.006 NG/ML (ref 0–0.03)
TSH SERPL DL<=0.005 MIU/L-ACNC: <0.01 UIU/ML (ref 0.4–4)
URN SPEC COLLECT METH UR: ABNORMAL
UROBILINOGEN UR STRIP-ACNC: NEGATIVE EU/DL
WBC # BLD AUTO: 5.27 K/UL (ref 3.9–12.7)
WBC #/AREA URNS HPF: 2 /HPF (ref 0–5)

## 2023-06-09 PROCEDURE — 63600175 PHARM REV CODE 636 W HCPCS: Performed by: INTERNAL MEDICINE

## 2023-06-09 PROCEDURE — 99223 1ST HOSP IP/OBS HIGH 75: CPT | Mod: ,,, | Performed by: SURGERY

## 2023-06-09 PROCEDURE — 84100 ASSAY OF PHOSPHORUS: CPT | Performed by: INTERNAL MEDICINE

## 2023-06-09 PROCEDURE — 85025 COMPLETE CBC W/AUTO DIFF WBC: CPT | Performed by: EMERGENCY MEDICINE

## 2023-06-09 PROCEDURE — 83735 ASSAY OF MAGNESIUM: CPT | Mod: 91 | Performed by: INTERNAL MEDICINE

## 2023-06-09 PROCEDURE — 83735 ASSAY OF MAGNESIUM: CPT | Performed by: EMERGENCY MEDICINE

## 2023-06-09 PROCEDURE — 36415 COLL VENOUS BLD VENIPUNCTURE: CPT | Performed by: INTERNAL MEDICINE

## 2023-06-09 PROCEDURE — G0378 HOSPITAL OBSERVATION PER HR: HCPCS

## 2023-06-09 PROCEDURE — 96366 THER/PROPH/DIAG IV INF ADDON: CPT

## 2023-06-09 PROCEDURE — 82272 OCCULT BLD FECES 1-3 TESTS: CPT

## 2023-06-09 PROCEDURE — 87045 FECES CULTURE AEROBIC BACT: CPT | Performed by: INTERNAL MEDICINE

## 2023-06-09 PROCEDURE — 81000 URINALYSIS NONAUTO W/SCOPE: CPT | Performed by: EMERGENCY MEDICINE

## 2023-06-09 PROCEDURE — 96376 TX/PRO/DX INJ SAME DRUG ADON: CPT

## 2023-06-09 PROCEDURE — 11000001 HC ACUTE MED/SURG PRIVATE ROOM

## 2023-06-09 PROCEDURE — 96375 TX/PRO/DX INJ NEW DRUG ADDON: CPT

## 2023-06-09 PROCEDURE — 93005 ELECTROCARDIOGRAM TRACING: CPT

## 2023-06-09 PROCEDURE — 84439 ASSAY OF FREE THYROXINE: CPT | Performed by: EMERGENCY MEDICINE

## 2023-06-09 PROCEDURE — 93010 ELECTROCARDIOGRAM REPORT: CPT | Mod: ,,, | Performed by: INTERNAL MEDICINE

## 2023-06-09 PROCEDURE — 96361 HYDRATE IV INFUSION ADD-ON: CPT

## 2023-06-09 PROCEDURE — 99285 EMERGENCY DEPT VISIT HI MDM: CPT | Mod: 25

## 2023-06-09 PROCEDURE — 25000003 PHARM REV CODE 250: Performed by: INTERNAL MEDICINE

## 2023-06-09 PROCEDURE — 81025 URINE PREGNANCY TEST: CPT | Performed by: EMERGENCY MEDICINE

## 2023-06-09 PROCEDURE — 83690 ASSAY OF LIPASE: CPT | Performed by: EMERGENCY MEDICINE

## 2023-06-09 PROCEDURE — 93010 EKG 12-LEAD: ICD-10-PCS | Mod: ,,, | Performed by: INTERNAL MEDICINE

## 2023-06-09 PROCEDURE — 80307 DRUG TEST PRSMV CHEM ANLYZR: CPT | Performed by: EMERGENCY MEDICINE

## 2023-06-09 PROCEDURE — 84443 ASSAY THYROID STIM HORMONE: CPT | Performed by: EMERGENCY MEDICINE

## 2023-06-09 PROCEDURE — 99223 PR INITIAL HOSPITAL CARE,LEVL III: ICD-10-PCS | Mod: ,,, | Performed by: SURGERY

## 2023-06-09 PROCEDURE — 63600175 PHARM REV CODE 636 W HCPCS: Performed by: EMERGENCY MEDICINE

## 2023-06-09 PROCEDURE — 84484 ASSAY OF TROPONIN QUANT: CPT | Performed by: EMERGENCY MEDICINE

## 2023-06-09 PROCEDURE — 87040 BLOOD CULTURE FOR BACTERIA: CPT | Mod: 59 | Performed by: EMERGENCY MEDICINE

## 2023-06-09 PROCEDURE — 87046 STOOL CULTR AEROBIC BACT EA: CPT | Performed by: INTERNAL MEDICINE

## 2023-06-09 PROCEDURE — 96372 THER/PROPH/DIAG INJ SC/IM: CPT | Mod: 59 | Performed by: INTERNAL MEDICINE

## 2023-06-09 PROCEDURE — 80048 BASIC METABOLIC PNL TOTAL CA: CPT | Mod: XB | Performed by: INTERNAL MEDICINE

## 2023-06-09 PROCEDURE — 83880 ASSAY OF NATRIURETIC PEPTIDE: CPT | Performed by: EMERGENCY MEDICINE

## 2023-06-09 PROCEDURE — 96374 THER/PROPH/DIAG INJ IV PUSH: CPT

## 2023-06-09 PROCEDURE — 25000003 PHARM REV CODE 250: Performed by: EMERGENCY MEDICINE

## 2023-06-09 PROCEDURE — 25000003 PHARM REV CODE 250: Performed by: STUDENT IN AN ORGANIZED HEALTH CARE EDUCATION/TRAINING PROGRAM

## 2023-06-09 PROCEDURE — 80053 COMPREHEN METABOLIC PANEL: CPT | Performed by: EMERGENCY MEDICINE

## 2023-06-09 PROCEDURE — 87427 SHIGA-LIKE TOXIN AG IA: CPT | Mod: 59 | Performed by: INTERNAL MEDICINE

## 2023-06-09 RX ORDER — NALOXONE HCL 0.4 MG/ML
0.02 VIAL (ML) INJECTION
Status: DISCONTINUED | OUTPATIENT
Start: 2023-06-09 | End: 2023-06-12 | Stop reason: HOSPADM

## 2023-06-09 RX ORDER — LOPERAMIDE HYDROCHLORIDE 2 MG/1
2 CAPSULE ORAL 3 TIMES DAILY
Status: COMPLETED | OUTPATIENT
Start: 2023-06-09 | End: 2023-06-10

## 2023-06-09 RX ORDER — IBUPROFEN 200 MG
24 TABLET ORAL
Status: DISCONTINUED | OUTPATIENT
Start: 2023-06-09 | End: 2023-06-12 | Stop reason: HOSPADM

## 2023-06-09 RX ORDER — ONDANSETRON 2 MG/ML
4 INJECTION INTRAMUSCULAR; INTRAVENOUS EVERY 8 HOURS PRN
Status: DISCONTINUED | OUTPATIENT
Start: 2023-06-09 | End: 2023-06-12 | Stop reason: HOSPADM

## 2023-06-09 RX ORDER — ONDANSETRON 2 MG/ML
4 INJECTION INTRAMUSCULAR; INTRAVENOUS
Status: COMPLETED | OUTPATIENT
Start: 2023-06-09 | End: 2023-06-09

## 2023-06-09 RX ORDER — GLUCAGON 1 MG
1 KIT INJECTION
Status: DISCONTINUED | OUTPATIENT
Start: 2023-06-09 | End: 2023-06-12 | Stop reason: HOSPADM

## 2023-06-09 RX ORDER — PROPRANOLOL HYDROCHLORIDE 10 MG/1
20 TABLET ORAL EVERY 6 HOURS
Status: DISCONTINUED | OUTPATIENT
Start: 2023-06-09 | End: 2023-06-12 | Stop reason: HOSPADM

## 2023-06-09 RX ORDER — IBUPROFEN 200 MG
16 TABLET ORAL
Status: DISCONTINUED | OUTPATIENT
Start: 2023-06-09 | End: 2023-06-12 | Stop reason: HOSPADM

## 2023-06-09 RX ORDER — ESCITALOPRAM OXALATE 5 MG/1
5 TABLET ORAL DAILY
Status: DISCONTINUED | OUTPATIENT
Start: 2023-06-09 | End: 2023-06-12 | Stop reason: HOSPADM

## 2023-06-09 RX ORDER — PANTOPRAZOLE SODIUM 40 MG/1
40 TABLET, DELAYED RELEASE ORAL DAILY
Status: DISCONTINUED | OUTPATIENT
Start: 2023-06-09 | End: 2023-06-12 | Stop reason: HOSPADM

## 2023-06-09 RX ORDER — HYDROMORPHONE HYDROCHLORIDE 1 MG/ML
0.5 INJECTION, SOLUTION INTRAMUSCULAR; INTRAVENOUS; SUBCUTANEOUS
Status: COMPLETED | OUTPATIENT
Start: 2023-06-09 | End: 2023-06-09

## 2023-06-09 RX ORDER — SODIUM CHLORIDE 9 MG/ML
INJECTION, SOLUTION INTRAVENOUS CONTINUOUS
Status: DISCONTINUED | OUTPATIENT
Start: 2023-06-09 | End: 2023-06-10

## 2023-06-09 RX ORDER — SODIUM CHLORIDE 0.9 % (FLUSH) 0.9 %
10 SYRINGE (ML) INJECTION
Status: DISCONTINUED | OUTPATIENT
Start: 2023-06-09 | End: 2023-06-12 | Stop reason: HOSPADM

## 2023-06-09 RX ORDER — MORPHINE SULFATE 4 MG/ML
2 INJECTION, SOLUTION INTRAMUSCULAR; INTRAVENOUS ONCE
Status: COMPLETED | OUTPATIENT
Start: 2023-06-09 | End: 2023-06-09

## 2023-06-09 RX ORDER — HYDROCORTISONE 10 MG/1
50 TABLET ORAL EVERY 8 HOURS
Status: DISCONTINUED | OUTPATIENT
Start: 2023-06-09 | End: 2023-06-11

## 2023-06-09 RX ORDER — ENOXAPARIN SODIUM 100 MG/ML
40 INJECTION SUBCUTANEOUS EVERY 24 HOURS
Status: DISCONTINUED | OUTPATIENT
Start: 2023-06-09 | End: 2023-06-12 | Stop reason: HOSPADM

## 2023-06-09 RX ORDER — METHIMAZOLE 5 MG/1
10 TABLET ORAL 3 TIMES DAILY
Status: DISCONTINUED | OUTPATIENT
Start: 2023-06-09 | End: 2023-06-12 | Stop reason: HOSPADM

## 2023-06-09 RX ORDER — ACETAMINOPHEN 325 MG/1
650 TABLET ORAL EVERY 8 HOURS PRN
Status: DISCONTINUED | OUTPATIENT
Start: 2023-06-09 | End: 2023-06-12 | Stop reason: HOSPADM

## 2023-06-09 RX ORDER — LOPERAMIDE HYDROCHLORIDE 2 MG/1
2 CAPSULE ORAL 4 TIMES DAILY PRN
Status: DISCONTINUED | OUTPATIENT
Start: 2023-06-11 | End: 2023-06-12 | Stop reason: HOSPADM

## 2023-06-09 RX ORDER — ACETAMINOPHEN 325 MG/1
650 TABLET ORAL EVERY 4 HOURS PRN
Status: DISCONTINUED | OUTPATIENT
Start: 2023-06-09 | End: 2023-06-12 | Stop reason: HOSPADM

## 2023-06-09 RX ORDER — MAG HYDROX/ALUMINUM HYD/SIMETH 200-200-20
30 SUSPENSION, ORAL (FINAL DOSE FORM) ORAL 4 TIMES DAILY PRN
Status: DISCONTINUED | OUTPATIENT
Start: 2023-06-09 | End: 2023-06-12 | Stop reason: HOSPADM

## 2023-06-09 RX ORDER — PROPRANOLOL HYDROCHLORIDE 10 MG/1
20 TABLET ORAL
Status: COMPLETED | OUTPATIENT
Start: 2023-06-09 | End: 2023-06-09

## 2023-06-09 RX ORDER — PROPRANOLOL HYDROCHLORIDE 40 MG/1
40 TABLET ORAL
Status: COMPLETED | OUTPATIENT
Start: 2023-06-09 | End: 2023-06-09

## 2023-06-09 RX ORDER — METHIMAZOLE 5 MG/1
30 TABLET ORAL
Status: COMPLETED | OUTPATIENT
Start: 2023-06-09 | End: 2023-06-09

## 2023-06-09 RX ADMIN — LOPERAMIDE HYDROCHLORIDE 2 MG: 2 CAPSULE ORAL at 09:06

## 2023-06-09 RX ADMIN — HYDROMORPHONE HYDROCHLORIDE 0.5 MG: 1 INJECTION, SOLUTION INTRAMUSCULAR; INTRAVENOUS; SUBCUTANEOUS at 02:06

## 2023-06-09 RX ADMIN — PANTOPRAZOLE SODIUM 40 MG: 40 TABLET, DELAYED RELEASE ORAL at 10:06

## 2023-06-09 RX ADMIN — SODIUM CHLORIDE: 9 INJECTION, SOLUTION INTRAVENOUS at 04:06

## 2023-06-09 RX ADMIN — SODIUM CHLORIDE, POTASSIUM CHLORIDE, SODIUM LACTATE AND CALCIUM CHLORIDE 1000 ML: 600; 310; 30; 20 INJECTION, SOLUTION INTRAVENOUS at 12:06

## 2023-06-09 RX ADMIN — METHIMAZOLE 10 MG: 5 TABLET ORAL at 08:06

## 2023-06-09 RX ADMIN — ACETAMINOPHEN 650 MG: 325 TABLET ORAL at 10:06

## 2023-06-09 RX ADMIN — HYDROCORTISONE 50 MG: 10 TABLET ORAL at 06:06

## 2023-06-09 RX ADMIN — HYDROCORTISONE 50 MG: 10 TABLET ORAL at 02:06

## 2023-06-09 RX ADMIN — PIPERACILLIN AND TAZOBACTAM 4.5 G: 4; .5 INJECTION, POWDER, LYOPHILIZED, FOR SOLUTION INTRAVENOUS; PARENTERAL at 03:06

## 2023-06-09 RX ADMIN — PROPRANOLOL HYDROCHLORIDE 20 MG: 10 TABLET ORAL at 09:06

## 2023-06-09 RX ADMIN — PROPRANOLOL HYDROCHLORIDE 20 MG: 10 TABLET ORAL at 05:06

## 2023-06-09 RX ADMIN — PIPERACILLIN AND TAZOBACTAM 4.5 G: 4; .5 INJECTION, POWDER, LYOPHILIZED, FOR SOLUTION INTRAVENOUS; PARENTERAL at 09:06

## 2023-06-09 RX ADMIN — METHIMAZOLE 30 MG: 5 TABLET ORAL at 02:06

## 2023-06-09 RX ADMIN — PIPERACILLIN AND TAZOBACTAM 4.5 G: 4; .5 INJECTION, POWDER, LYOPHILIZED, FOR SOLUTION INTRAVENOUS; PARENTERAL at 11:06

## 2023-06-09 RX ADMIN — ESCITALOPRAM 5 MG: 5 TABLET, FILM COATED ORAL at 08:06

## 2023-06-09 RX ADMIN — MORPHINE SULFATE 2 MG: 4 INJECTION, SOLUTION INTRAMUSCULAR; INTRAVENOUS at 05:06

## 2023-06-09 RX ADMIN — PROPRANOLOL HYDROCHLORIDE 40 MG: 40 TABLET ORAL at 12:06

## 2023-06-09 RX ADMIN — PROPRANOLOL HYDROCHLORIDE 20 MG: 10 TABLET ORAL at 02:06

## 2023-06-09 RX ADMIN — HYDROCORTISONE 50 MG: 10 TABLET ORAL at 09:06

## 2023-06-09 RX ADMIN — ONDANSETRON 4 MG: 2 INJECTION INTRAMUSCULAR; INTRAVENOUS at 04:06

## 2023-06-09 RX ADMIN — METHIMAZOLE 10 MG: 5 TABLET ORAL at 09:06

## 2023-06-09 RX ADMIN — PROPRANOLOL HYDROCHLORIDE 20 MG: 10 TABLET ORAL at 12:06

## 2023-06-09 RX ADMIN — ONDANSETRON 4 MG: 2 INJECTION INTRAMUSCULAR; INTRAVENOUS at 02:06

## 2023-06-09 RX ADMIN — ENOXAPARIN SODIUM 40 MG: 40 INJECTION SUBCUTANEOUS at 04:06

## 2023-06-09 RX ADMIN — METHIMAZOLE 10 MG: 5 TABLET ORAL at 02:06

## 2023-06-09 RX ADMIN — LOPERAMIDE HYDROCHLORIDE 2 MG: 2 CAPSULE ORAL at 04:06

## 2023-06-09 NOTE — PLAN OF CARE
Case Management Assessment     PCP: [facility]  Pharmacy: [facility] [place]    Patient Arrived From: Home  Existing Help at Home: Mother    Barriers to Discharge: none    Discharge Plan:    A. Home   B. Home with family     06/09/23 1152   Discharge Assessment   Assessment Type Discharge Planning Assessment   Confirmed/corrected address, phone number and insurance Yes   Confirmed Demographics Correct on Facesheet   Source of Information patient   Does patient/caregiver understand observation status No   Was observation education provided? No   Communicated FLORY with patient/caregiver Yes   Facility Arrived From: Home   Do you expect to return to your current living situation? Yes   Do you have help at home or someone to help you manage your care at home? Yes   Who are your caregiver(s) and their phone number(s)? Mom   Prior to hospitilization cognitive status: Alert/Oriented   Current cognitive status: Alert/Oriented   Equipment Currently Used at Home none   Readmission within 30 days? No   Patient currently being followed by outpatient case management? No   Do you currently have service(s) that help you manage your care at home? No   Do you take prescription medications? Yes   Do you have prescription coverage? Yes   Coverage [employer]   Do you have any problems affording any of your prescribed medications? No   Is the patient taking medications as prescribed? yes   How do you get to doctors appointments? family or friend will provide   Are you on dialysis? No   Do you take coumadin? No   DME Needed Upon Discharge  none   Discharge Plan discussed with: Patient

## 2023-06-09 NOTE — ASSESSMENT & PLAN NOTE
Thyrotoxicosis Management     Step 1:   Methimazole 10 mg TID   Propranolol 20 mg po q6 hours   Hydrocortisone 50 mg po q8 hours   SSKI eye drops, 3-4 drops po 3 to 4 times per day   Check thyrotropin receptor antibody   Repeat TSH, T3, and T4 level daily     Step 2:   Methimazole 10 mg BID   Discontinue propranolol and start atenolol 25 mg daily    Wean hydrocortisone to 25 mg b.i.d. after that, then d/c   Okay to discontinue the eyedrops   Patient can likely discharge after tapering of hydrocortisone

## 2023-06-09 NOTE — NURSING
"Per patient and mother at bedside, patient had 4 episodes of diarrhea and one episode of nausea and vomiting, staff helped cleaned and assist pt back into bed, patient stated feeling "weak". MD notified awaiting response. Bed in low position, call light within reach, will continue to monitor.   "

## 2023-06-09 NOTE — SUBJECTIVE & OBJECTIVE
Past Medical History:   Diagnosis Date    Migraines     Primary hypertension 03/04/2023    Thyroid disease        History reviewed. No pertinent surgical history.    Review of patient's allergies indicates:   Allergen Reactions    Ibuprofen Hives and Edema       No current facility-administered medications on file prior to encounter.     Current Outpatient Medications on File Prior to Encounter   Medication Sig    atenoloL (TENORMIN) 25 MG tablet Take 1 tablet (25 mg total) by mouth 2 (two) times a day.    EScitalopram oxalate (LEXAPRO) 5 MG Tab Take 1 tablet (5 mg total) by mouth once daily.    methIMAzole (TAPAZOLE) 10 MG Tab Take 1 tablet by mouth twice daily for 1 week, then reduce to 1 tablet by mouth once daily. Go to Endocrinologist for adjustments asap.    nicotine (NICODERM CQ) 21 mg/24 hr Place 1 patch onto the skin once daily.    [DISCONTINUED] metoprolol succinate (TOPROL-XL) 25 MG 24 hr tablet Take 1 tablet (25 mg total) by mouth once daily.     Family History    None       Tobacco Use    Smoking status: Every Day     Packs/day: 2.00     Types: Cigarettes    Smokeless tobacco: Never    Tobacco comments:     1 pack of cigarettes will last the pt 2 days.   Substance and Sexual Activity    Alcohol use: Yes     Comment: two drinks every 3 days.    Drug use: Yes     Types: Marijuana     Comment: uses marijuasa daily     Sexual activity: Yes     Partners: Male     Review of Systems  12 point systems reviewed and negative except as mentioned in HPI section.    Objective:     Vital Signs (Most Recent):  Temp: 99.6 °F (37.6 °C) (06/08/23 2345)  Pulse: 107 (06/09/23 0243)  Resp: 20 (06/09/23 0211)  BP: (!) 166/99 (06/09/23 0243)  SpO2: 98 % (06/09/23 0131) Vital Signs (24h Range):  Temp:  [99.6 °F (37.6 °C)] 99.6 °F (37.6 °C)  Pulse:  [107-126] 107  Resp:  [18-20] 20  SpO2:  [98 %-99 %] 98 %  BP: (161-167)/(80-99) 166/99     Weight: 53.5 kg (118 lb)  Body mass index is 21.58 kg/m².     Physical Exam   General:  Alert , appears in some distress secondary to abdominal discomfort, palpitations.     Eye: PERRL, normal conjunctiva  HEENT: Normocephalic, atraumatic, dry  oral mucosa  Neck: Supple.  Respiratory: Lungs CTA, equal breath sounds, symmetric expansion, no chest wall tenderness  Cardiovascular:  Tachycardic, regular rhythm, soft systolic murmur.  Gastrointestinal: Soft, diffusely tender, non-distended, hyperactive bowel sounds  Genitourinary: Deferred  Musculoskeletal and Neurologic: Motor function is normal with muscle strength 5/5 bilaterally to upper and lower extremities. Sensation is intact bilaterally.  Hyperreflexic.  Psychiatric: Appropriate mood and affect.          Significant Labs: All pertinent labs within the past 24 hours have been reviewed.  TSH:   Recent Labs   Lab 06/09/23  0032   TSH <0.010*       Significant Imaging: I have reviewed all pertinent imaging results/findings within the past 24 hours.

## 2023-06-09 NOTE — ASSESSMENT & PLAN NOTE
Continue IV Zosyn for now. Denies particular urinary symptoms but does complain of diffuse abdominal pain with possible enteritis, pending stool studies.

## 2023-06-09 NOTE — NURSING
New orders received, admin to patient, pt tolerated well, patient resting quietly in bed, family at bedside, NAD noted.

## 2023-06-09 NOTE — H&P
[facility]  [facility] Medicine  History & Physical    Patient Name: Morgan Caro  MRN: [id number]  Patient Class: IP- Inpatient  Admission Date: 6/8/2023  Attending Physician: Jaxon Alberts MD   Primary Care Provider: [facility]         Patient information was obtained from patient and ER records.     Subjective:     Principal Problem:Thyrotoxicosis with thyrotoxic crisis    Chief Complaint:   Chief Complaint   Patient presents with    Abdominal Pain     Pt arrived via ems, pt chief complaint is Abdominal pain. Pt states has had abd pain since this morning. Pt states abd pain is generalized and non radiating.         HPI: Ms Caro Is a 43-year-old  female being admitted for evaluation of generally feeling ill, diffuse abdominal discomfort, nausea vomiting diarrhea, palpitations and chest discomfort.  Medical history significant for hypothyroidism, hypertension, migraines, and she has had previous admission related to similar situation back in March 2023 for thyrotoxicosis status for which she was admitted in the intensive care unit.  Before that hospitalization, she had an admission back in [facility] for 2 days and had left against medical advice.  During her previous March hospitalization, she again left against medical advice .  She was given prescription upon discharge for medications and reports that she took some, however has not been otherwise adherent or compliant with recommended medical regimen.  She has not had outpatient follow-up with any particular physicians.  She continues to smoke.  Upon arrival in the ER, her TFTs are consistent again with thyrotoxicosis.  CT scan shows possible enteritis, and there is mentioning of small-bowel obstruction or ileus however she is having loose bowel movements.  Her urinalysis shows many bacteria, and nitrites positive, although she denies any particular urinary symptoms she does have abdominal pain.  She is being admitted  for further evaluation        Past Medical History:   Diagnosis Date    Migraines     Primary hypertension 03/04/2023    Thyroid disease        History reviewed. No pertinent surgical history.    Review of patient's allergies indicates:   Allergen Reactions    Ibuprofen Hives and Edema       No current facility-administered medications on file prior to encounter.     Current Outpatient Medications on File Prior to Encounter   Medication Sig    atenoloL (TENORMIN) 25 MG tablet Take 1 tablet (25 mg total) by mouth 2 (two) times a day.    EScitalopram oxalate (LEXAPRO) 5 MG Tab Take 1 tablet (5 mg total) by mouth once daily.    methIMAzole (TAPAZOLE) 10 MG Tab Take 1 tablet by mouth twice daily for 1 week, then reduce to 1 tablet by mouth once daily. Go to Endocrinologist for adjustments asap.    nicotine (NICODERM CQ) 21 mg/24 hr Place 1 patch onto the skin once daily.    [DISCONTINUED] metoprolol succinate (TOPROL-XL) 25 MG 24 hr tablet Take 1 tablet (25 mg total) by mouth once daily.     Family History    None       Tobacco Use    Smoking status: Every Day     Packs/day: 2.00     Types: Cigarettes    Smokeless tobacco: Never    Tobacco comments:     1 pack of cigarettes will last the pt 2 days.   Substance and Sexual Activity    Alcohol use: Yes     Comment: two drinks every 3 days.    Drug use: Yes     Types: Marijuana     Comment: uses marijuasa daily     Sexual activity: Yes     Partners: Male     Review of Systems  12 point systems reviewed and negative except as mentioned in HPI section.    Objective:     Vital Signs (Most Recent):  Temp: 99.6 °F (37.6 °C) (06/08/23 2345)  Pulse: 107 (06/09/23 0243)  Resp: 20 (06/09/23 0211)  BP: (!) 166/99 (06/09/23 0243)  SpO2: 98 % (06/09/23 0131) Vital Signs (24h Range):  Temp:  [99.6 °F (37.6 °C)] 99.6 °F (37.6 °C)  Pulse:  [107-126] 107  Resp:  [18-20] 20  SpO2:  [98 %-99 %] 98 %  BP: (161-167)/(80-99) 166/99     Weight: 53.5 kg (118 lb)  Body mass index is  21.58 kg/m².     Physical Exam   General: Alert , appears in some distress secondary to abdominal discomfort, palpitations.     Eye: PERRL, normal conjunctiva  HEENT: Normocephalic, atraumatic, dry  oral mucosa  Neck: Supple.  Respiratory: Lungs CTA, equal breath sounds, symmetric expansion, no chest wall tenderness  Cardiovascular:  Tachycardic, regular rhythm, soft systolic murmur.  Gastrointestinal: Soft, diffusely tender, non-distended, hyperactive bowel sounds  Genitourinary: Deferred  Musculoskeletal and Neurologic: Motor function is normal with muscle strength 5/5 bilaterally to upper and lower extremities. Sensation is intact bilaterally.  Hyperreflexic.  Psychiatric: Appropriate mood and affect.          Significant Labs: All pertinent labs within the past 24 hours have been reviewed.  TSH:   Recent Labs   Lab 06/09/23  0032   TSH <0.010*       Significant Imaging: I have reviewed all pertinent imaging results/findings within the past 24 hours.    Assessment/Plan:     * Thyrotoxicosis with thyrotoxic crisis  Has discussed the case with case discussed with ER physician, who in turn has discussed the case with Endocrinology with recommendation of starting methimazole 30 mg b.i.d., and propranolol.  We will also instate IV hydrocortisone for now. Was counseled about risk for agranulocytosis.    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  Recommend outpatient Cardiology follow-up      Acute UTI  Continue IV Zosyn for now. Denies particular urinary symptoms but does complain of diffuse abdominal pain with possible enteritis, pending stool studies.      Primary hypertension   Continue propranolol.  Switch back to atenolol on discharge.      Tobacco abuse counseling  Provided smoking cessation counseling for 7 minutes including discussion of  risks of ongoing tobacco abuse and offered treatments such as nicotine patch, which could be appropriate after resolution of her thyrotoxicosis.        Migraines  Chronic. Not on any other meds except beta blocker. No flare at this time.    VTE Risk Mitigation (From admission, onward)         Ordered     enoxaparin injection 40 mg  Daily         06/09/23 0329     IP VTE HIGH RISK PATIENT  Once         06/09/23 0329     Place sequential compression device  Until discontinued         06/09/23 0329                           Jayy Mac MD  Department of Hospital Medicine  [facility] [place] - Emergency Dept

## 2023-06-09 NOTE — ASSESSMENT & PLAN NOTE
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  Recommend outpatient Cardiology follow-up

## 2023-06-09 NOTE — PLAN OF CARE
Recommendations     Recommend Boost BID to help meet EEN/EPN.  Continue to monitor PO intake  Monitor need for TF recs  Monitor weight changes  Goals: 1. Pt to meet % EEN/EPN by RD follow up  Nutrition Goal Status: new  Communication of RD Recs:  (POC)     Assessment and Plan     Nutrition Problem:  Moderate/Severe Protein-Calorie Malnutrition  Malnutrition in the context of Acute Illness/Injury     Related to (etiology):  Diagnosis related symptoms     Signs and Symptoms (as evidenced by):  Energy Intake: <50% of estimated energy requirement for > 1 week  Body Fat Depletion: moderate depletion of orbitals and triceps   Muscle Mass Depletion: moderate depletion of temples, clavicle region, and scapular region   Weight Loss: 18% x 1 year   Fluid Accumulation: moderate     Interventions(treatment strategy):  Commercial beverage  Collaboration with medical providers     Nutrition Diagnosis Status:  New

## 2023-06-09 NOTE — HPI
Ms Caro Is a 43-year-old  female being admitted for evaluation of generally feeling ill, diffuse abdominal discomfort, nausea vomiting diarrhea, palpitations and chest discomfort.  Medical history significant for hypothyroidism, hypertension, migraines, and she has had previous admission related to similar situation back in March 2023 for thyrotoxicosis status for which she was admitted in the intensive care unit.  Before that hospitalization, she had an admission back in [facility] for 2 days and had left against medical advice.  During her previous March hospitalization, she again left against medical advice .  She was given prescription upon discharge for medications and reports that she took some, however has not been otherwise adherent or compliant with recommended medical regimen.  She has not had outpatient follow-up with any particular physicians.  She continues to smoke.  Upon arrival in the ER, her TFTs are consistent again with thyrotoxicosis.  CT scan shows possible enteritis, and there is mentioning of small-bowel obstruction or ileus however she is having loose bowel movements.  Her urinalysis shows many bacteria, and nitrites positive, although she denies any particular urinary symptoms she does have abdominal pain.  She is being admitted for further evaluation

## 2023-06-09 NOTE — CARE UPDATE
General Surgery called by Dr. Abbott for concern of small bowel obstruction.  Discussed that if concerned for a small bowel obstruction the recommendation is NG tube decompression tonight.  CT imaging reviewed and no evidence of a transition point or closed loop obstruction seen.      Ty Gan MD - PGY IV  General Surgery

## 2023-06-09 NOTE — ED NOTES
Pts mother notified of pts new room assignment per request. Pt is aware of this and gave RN permission to notify family.

## 2023-06-09 NOTE — ASSESSMENT & PLAN NOTE
Has discussed the case with case discussed with ER physician, who in turn has discussed the case with Endocrinology with recommendation of starting methimazole 30 mg b.i.d., and propranolol.  We will also instate IV hydrocortisone for now. Was counseled about risk for agranulocytosis.

## 2023-06-09 NOTE — ASSESSMENT & PLAN NOTE
Provided smoking cessation counseling for 7 minutes including discussion of risks of ongoing tobacco abuse and offered treatments such as nicotine patch, which could be appropriate after resolution of her thyrotoxicosis.

## 2023-06-09 NOTE — PROGRESS NOTES
I have seen this patient, reviewed the history and physical, assessment and plan. I have personally interviewed and examined the patient at bedside and agree with the findings with the following comments/updates:    Thyrotoxicosis Management    Step 1:  Methimazole 10 mg TID  Propranolol 20 mg po q6 hours  Hydrocortisone 50 mg po q8 hours  SSKI eye drops, 3-4 drops po 3 to 4 times per day  Check thyrotropin receptor antibody  Repeat TSH, T3, and T4 level daily    Step 2:  Methimazole 10 mg BID  Discontinue propranolol and start atenolol 25 mg daily   Wean hydrocortisone to 25 mg b.i.d. after that, then d/c  Okay to discontinue the eyedrops  Patient can likely discharge after tapering of hydrocortisone       Jaxon Alberts MD  Internal Medicine Staff  [phone] pager

## 2023-06-09 NOTE — ED TRIAGE NOTES
"Pt presents to ED via EMS d/t nausea/vomiting x7 episodes, diarrhea x 6 episodes and RLQ abd pain that started this AM. Pt states, "My thyroid is acting up".  Pt also reports HA with photophobia.   Denies chest pain, SOB, numbness/tinging to extremities. Pt is tachycardic.  Pt is alert, calm, and oriented x4, placed on cardiac monitoring and continuous pulse ox, 98% on RA.   "

## 2023-06-09 NOTE — ED PROVIDER NOTES
"Encounter Date: 6/8/2023    SCRIBE #1 NOTE: I, Erna Morton, am scribing for, and in the presence of,  Ivis Abbott MD.     History     Chief Complaint   Patient presents with    Abdominal Pain     Pt arrived via ems, pt chief complaint is Abdominal pain. Pt states has had abd pain since this morning. Pt states abd pain is generalized and non radiating.      Morgan Caro is a 43 y.o. female, with a PMHx of HTN, migraine headaches, thyrotoxicosis, who presents to the ED with abdominal pain and multiple other complaints. Patient was recently admitted at this facility for thyroid storm in 3/2023 where she left AMA. Patient reports onset of generalized abdominal pain, nausea, vomiting (x7-8 episodes, liquid emesis), diarrhea (7 episodes), heart racing palpitations today. Further notes hand tremors "for a while". Also notes she has been getting less sleep than usual. No other exacerbating or alleviating factors. Denies fever, chest pain, dyspnea, dysuria, frequency, hematuria, urgency, or other associated symptoms. Patient reports she has been off all her daily medications (antihypertensives, for thyroid issues) since being discharged from this facility on 3/2023. She states she was given the medications prior to her discharge, but had lost her belongings including her meds. She has not refilled them at the pharmacy. She has not f/u with cardiology or endocrinology since her discharge, she claims they had never called her back for appointments. She reports daily consumption of an "upper, trains, 627 pills" (tramadol?), which she takes BID, did not take today yet. Patient admits to tobacco use (1ppd). Patient denies EtOH, or illicit drug use. No PSHx. Patient reports drug allergy to ibuprofen ("it makes me nauseous and vomit"). Per her social hx, she lives at home with her mother, who also helps take care of her health.     The history is provided by the patient and medical records.   Review of patient's allergies " indicates:   Allergen Reactions    Ibuprofen Hives and Edema     Past Medical History:   Diagnosis Date    Migraines     Primary hypertension 03/04/2023    Thyroid disease      History reviewed. No pertinent surgical history.  History reviewed. No pertinent family history.  Social History     Tobacco Use    Smoking status: Every Day     Packs/day: 2.00     Types: Cigarettes    Smokeless tobacco: Never    Tobacco comments:     1 pack of cigarettes will last the pt 2 days.   Substance Use Topics    Alcohol use: Yes     Comment: two drinks every 3 days.    Drug use: Yes     Types: Marijuana     Comment: uses marijuasa daily      Review of Systems   Constitutional:  Negative for chills, diaphoresis and fever.   Eyes:  Negative for photophobia and visual disturbance.   Respiratory:  Negative for cough and shortness of breath.    Cardiovascular:  Positive for palpitations. Negative for chest pain and leg swelling.   Gastrointestinal:  Positive for abdominal pain, diarrhea, nausea and vomiting. Negative for blood in stool and constipation.   Genitourinary:  Negative for dysuria, flank pain, frequency, hematuria and urgency.   Musculoskeletal:  Negative for neck pain and neck stiffness.   Skin:  Negative for rash and wound.   Neurological:  Positive for tremors. Negative for weakness, light-headedness, numbness and headaches.   Psychiatric/Behavioral:  Negative for confusion and suicidal ideas.    All other systems reviewed and are negative.    Physical Exam     Initial Vitals [06/08/23 2345]   BP Pulse Resp Temp SpO2   (!) 161/88 (!) 126 18 99.6 °F (37.6 °C) 99 %      MAP       --         Physical Exam    Nursing note and vitals reviewed.  Constitutional: She appears well-developed and well-nourished. She is not diaphoretic. No distress.   Thin, cachetic. Nontoxic in appearance    HENT:   Head: Normocephalic and atraumatic.   Mouth/Throat: Oropharynx is clear and moist. No oropharyngeal exudate.   Eyes: Conjunctivae and  EOM are normal. Pupils are equal, round, and reactive to light. Right eye exhibits no discharge. Left eye exhibits no discharge.   Neck: Neck supple. No JVD present.   Goiter.    Normal range of motion.  Cardiovascular:  Regular rhythm, normal heart sounds and intact distal pulses.     Exam reveals no gallop and no friction rub.       No murmur heard.  Tachycardic    Pulmonary/Chest: Breath sounds normal. No respiratory distress. She has no wheezes. She has no rhonchi. She has no rales.   Abdominal: Abdomen is soft. Bowel sounds are normal. She exhibits no distension. There is abdominal tenderness.   Generalized abdominal tenderness, mild.  There is no rebound and no guarding.   Musculoskeletal:         General: Edema present. No tenderness.      Cervical back: Normal range of motion and neck supple.      Comments: Trace pitting edema to ankles.      Lymphadenopathy:     She has no cervical adenopathy.   Neurological: She is alert and oriented to person, place, and time. She has normal strength. No cranial nerve deficit or sensory deficit. GCS score is 15. GCS eye subscore is 4. GCS verbal subscore is 5. GCS motor subscore is 6.   Moves all extremities and carries on conversation. CN- II: PERRL; III/IV/VI: EOMI w/out evidence of nystagmus; V: no deficits appreciated to light touch bilateral face; VII: no facial weakness, no facial asymmetry. Eyebrow raise symmetric. Smile symmetric; IX/X: palate midline, and raises symmetrically; XI: shoulder shrug 5/5 bilaterally; XII: tongue is midline w/out asymmetry. Strength 5/5 to bilateral upper and lower extremities, sensation intact to light touch, mild tremor on exam   Skin: Skin is warm and dry. Capillary refill takes less than 2 seconds.   Psychiatric: She has a normal mood and affect. Thought content normal.       ED Course   Procedures  Labs Reviewed   CBC W/ AUTO DIFFERENTIAL - Abnormal; Notable for the following components:       Result Value    RBC 6.26 (*)     MCV  71 (*)     MCH 22.8 (*)     All other components within normal limits   COMPREHENSIVE METABOLIC PANEL - Abnormal; Notable for the following components:    Alkaline Phosphatase 403 (*)     All other components within normal limits   DRUG SCREEN PANEL, URINE EMERGENCY - Abnormal; Notable for the following components:    Opiate Scrn, Ur Presumptive Positive (*)     THC Presumptive Positive (*)     All other components within normal limits    Narrative:     Specimen Source->Urine   URINALYSIS, REFLEX TO URINE CULTURE - Abnormal; Notable for the following components:    Protein, UA 3+ (*)     Ketones, UA 1+ (*)     Occult Blood UA Trace (*)     Nitrite, UA Positive (*)     All other components within normal limits    Narrative:     Specimen Source->Urine   TSH - Abnormal; Notable for the following components:    TSH <0.010 (*)     All other components within normal limits   T4, FREE - Abnormal; Notable for the following components:    Free T4 >5.00 (*)     All other components within normal limits   B-TYPE NATRIURETIC PEPTIDE - Abnormal; Notable for the following components:     (*)     All other components within normal limits   URINALYSIS MICROSCOPIC - Abnormal; Notable for the following components:    Bacteria Many (*)     Hyaline Casts, UA 6 (*)     All other components within normal limits    Narrative:     Specimen Source->Urine   CULTURE, BLOOD   CULTURE, BLOOD   LIPASE   MAGNESIUM   B-TYPE NATRIURETIC PEPTIDE   TROPONIN I   T4, FREE   TROPONIN I   SARS-COV-2 RDRP GENE   POCT INFLUENZA A/B MOLECULAR   POCT URINE PREGNANCY          Imaging Results              X-Ray Chest AP Portable (Final result)  Result time 06/09/23 02:48:18      Final result by Pete Soler MD (06/09/23 02:48:18)                   Impression:      No radiographic evidence of acute cardiopulmonary abnormality.  Correlate clinically.    There are numerous pre-existing findings, including borderline enlargement of the cardiopericardial  silhouette, and pulmonary artery enlargement.      Electronically signed by: Pete Soler  Date:    06/09/2023  Time:    02:48               Narrative:    EXAMINATION:  XR CHEST AP PORTABLE    CLINICAL HISTORY:  Thyrotoxicosis, unspecified without thyrotoxic crisis or storm    TECHNIQUE:  Single frontal view of the chest was performed.    COMPARISON:  Portable chest x-ray 03/03/2023.    CTA chest 08/13/2021.    PA and lateral chest x-rays 08/12/2021, and 05/08/2019.    FINDINGS:  EKG leads project upon the chest.  Rightward rotation of the patient's torso.    Midline thoracic trachea.    Pre-existing pulmonary artery enlargement, similar to recent comparison studies but new from 05/08/2019.    Borderline-enlarged cardiopericardial silhouette, similar to 08/12/2021.    Bilateral hilar prominence, also similar to recent comparison studies and likely on a vascular basis.    No confluent pulmonary airspace opacification, discrete pneumothorax, or blunting of either lateral costophrenic angle.  The slightly more radiopaque appearance of left hemithorax, relative to the right, is most likely related to patient rotation.    Osseous detail is suboptimally visualized, especially in the spine.                                       CT Abdomen Pelvis  Without Contrast (Final result)  Result time 06/09/23 01:17:02      Final result by Nalini Benitez MD (06/09/23 01:17:02)                   Impression:      Limited examination.  Prominent and mildly dilated fluid-filled and air-fluid filled containing loops of bowel with no definite transition point.  Decompressed colon.  Findings may represent ileus, partial or evolving small bowel obstruction, or enteritis.  Recommend clinical correlation.    Cardiomegaly.    Stable ground-glass opacities at the lung bases, which may be related to atelectatic change or infiltrates.      Electronically signed by: aNlini Benitez  Date:    06/09/2023  Time:    01:17                Narrative:    EXAMINATION:  CT ABDOMEN PELVIS WITHOUT    CLINICAL HISTORY:  Abdominal pain.    TECHNIQUE:  5 mm unenhanced axial images from the lung bases through the greater trochanters were performed.  Coronal and sagittal reformatted images were provided.    COMPARISON:  03/03/2023    FINDINGS:  Within the limits of motion artifact and noncontrast examination, there is mild fatty infiltration of the liver.  Spleen, pancreas, kidneys, and adrenal glands are unremarkable.  The gallbladder contains no calcified gallstones.    There is mild prominence of the retroperitoneal lymph nodes.  There is no abdominal ascites..  Mild atherosclerotic changes are present.    There are prominent and mildly dilated fluid-filled and air-fluid containing small bowel loops.  No small bowel feces sign is seen to indicate a definite transition point.  The large bowel is decompressed.    The large bowel is decompressed.  There are no pelvic masses or adenopathy.  There is no free pelvic fluid.  The appendix is not inflamed.    At the lung bases, there are ground-glass opacities at the lung bases.  The heart is enlarged.                                       Medications   methIMAzole tablet 30 mg (has no administration in time range)   piperacillin-tazobactam (ZOSYN) 4.5 g in dextrose 5 % in water (D5W) 5 % 100 mL IVPB (MB+) (has no administration in time range)   lactated ringers bolus 1,000 mL (0 mLs Intravenous Stopped 6/9/23 0217)   propranoloL tablet 40 mg (40 mg Oral Given 6/9/23 0037)   HYDROmorphone injection 0.5 mg (0.5 mg Intravenous Given 6/9/23 0211)   propranoloL tablet 20 mg (20 mg Oral Given 6/9/23 0234)   ondansetron injection 4 mg (4 mg Intravenous Given 6/9/23 0220)     Medical Decision Making:   History:   Old Medical Records: I decided to obtain old medical records.  Old Records Summarized: other records.       <> Summary of Records: External documents reviewed:   Patient admitted at this facility on 3/3/23.   Pt  "admitted approx 1 month ago at [facility] for 2 days for similar presentations but left AMA due to pain from the IV ("I just didn't want to get stuck anymore.")       She presented with persistent palpitations, difficulty swallowing secondary to throat pain, increased agitation, foot swelling, intermittent fever, chills, and shortness of breath worse on exertion for the last month. Notes associated decreased P/O intake secondary to throat pain and "bad" diarrhea which began several days ago. Of note, reports ongoing goiter which has been chronic for months. She reports family thyroid history, stating her cousin has hyperthyroidism. No new precipitating event such as infection, etc. Admitted to ICU on 03/04/2023 with thyrotoxic crisis.    Case was discussed with Endocrinology on call at the time.   They recommended the following interventions:          1) PTU loading dose 400mg, then 200mg Q4 hours          2) Propranolol 60-80mg po Q6 hours          3) Hydrocortisone 100mg iv q8 hours          4) SSKI drops, 3-4 drops po 3-4 times per day    TSH <0.010 and free T4 >5.0. US thyroid shows thyroid appears enlarged and heterogeneous with increased vascularity and positive for lymph nodes. CT abd showed no acute abdominal or pelvic pathology. Echo w/EF 55% and pHTN. Had a delirium episode during her admission. She demanded to leave AMA after 4 day stay.   Independently Interpreted Test(s):   I have ordered and independently interpreted EKG Reading(s) - see summary below       <> Summary of EKG Reading(s): EKG independently interpreted by me reads: see ED course.   Clinical Tests:   Lab Tests: Ordered and Reviewed  Radiological Study: Ordered and Reviewed  Medical Tests: Ordered and Reviewed   Dictation #1  MRN:[id number]  CSN:[id number]   MDM  44 yo female with PMHx of HTN, migraines, thyrotoxicosis presents with N/V/D, tremor and tachycardia. Patient score of 35 on the Garner-Wartofsky point scale. DDx includes but is not " limited to hyperthyoridism, thyrotoxicosis, thyroid storm, covid, flu, gastroenteritis, occult infection, metabolic derangement, withdrawal, intoxication.     Patient given propranolol 40 mg, improvement of HR from 130s to 110s. Will give 20 mg additional to make 60 mg per hour.     Utox + for opiate, MJ.    UA with nitrite positive but 2 WBC. Given high suspicion for infection in thyrotoxicosis will obtain blood cultures and treat with zosyn to cover diarrhea and UTI.   Flu/Covid negative    , improved from her previous     No leukocytosis, no thrombocytopenia    Alkphos 403, has been previously elevated, uncertain cause? Other LFTS WNL. Normal Ca.   Normal Glu.     TSH undetectable and T4 > 5.00 consistent with medication noncompliance.     CXR without pulm edema but with chronic/stable cardiomegaly and pulmonary artery enlargement.     CT (obtained without contrast given concern for thyrotoxicosis) mildly dilated and fluid-filled, air-filled loops of bowel, no transition point, likely enteritis in this case given diarrhea, passing gas. No vomiting in ED. Discussed with Gen Surgery Dr. Gan, if believe SBO recommend NGT, otherwise symptomatically treat. I think SBO is less likely at this point, if develops vomiting will place NGT.    Discussed with Dr. Merchant with endocrinology who recommends:  Propanolol 40 mg q8hr  Methimazole 30 mg BID (UPT negative)   After methimazole--SSKI 5 drops q8hrs     We discussed that patient does not appear to be in thyroid storm at this time. Since vitals are stabilizing and not AMS, no acute heart failure, she is okay for floor at this time.     Discussed with Dr. Mac who evaluated patient at bedside, agrees with plan and agrees with keeping patient on floor at this time. If any changes in her vitals or mentation will transfer her to ICU at that time.      Patient is already high risk for leaving AMA given history of multiple admissions with leaving AMA in past. Will  try to keep patient comfortable to help facilitate her care.        Scribe Attestation:   Scribe #1: I performed the above scribed service and the documentation accurately describes the services I performed. I attest to the accuracy of the note.      ED Course as of 06/09/23 0254   Fri Jun 09, 2023   0051 EKG 12-lead    Sinus tachycardia 120.  Biatrial enlargement.  Rightward axis.  LVH.  No ST elevation or significant depression.  T-wave inversions in V1 and V2.  As well as aVL.  QTC is 43.  This EKG is faster and with some normalization of her T-waves from her previous in March of 2023. [JT]      ED Course User Index  [JT] Ivis Abbott MD               I, Ivis Abbott, personally performed the services described in this documentation. All medical record entries made by the scribe were at my direction and in my presence. I have reviewed the chart and agree that the record reflects my personal performance and is accurate and complete.\  Clinical Impression:   Final diagnoses:  [R00.0] Tachycardia  [E05.90] Thyrotoxicosis  [K52.9] Enteritis (Primary)        ED Disposition Condition    Admit Stable                Ivis Abbott MD  06/09/23 0307       Ivis Abbott MD  06/09/23 0306

## 2023-06-09 NOTE — CONSULTS
"[facility] Emergency Dept  General Surgery  Consult Note    Inpatient consult to General Surgery  Consult performed by: Ty Gan MD  Consult ordered by: Ivis Abbott MD  Reason for consult: "possible SBO"      Subjective:     Chief Complaint/Reason for Admission: Thyroid toxicosis    History of Present Illness: Ms. Caro is a 44yo female with hyperthyroidism who presents with a thyroid flare.  Comes in with complaints of diffuse abdominal pain, nausea, emesis, and diarrhea.  TSH undetectable and T4 > 5.  CT scan obtained with read as: "Limited examination.  Prominent and mildly dilated fluid-filled and air-fluid filled containing loops of bowel with no definite transition point.  Decompressed colon.  Findings may represent ileus, partial or evolving small bowel obstruction, or enteritis.  Recommend clinical correlation."      No current facility-administered medications on file prior to encounter.     Current Outpatient Medications on File Prior to Encounter   Medication Sig    atenoloL (TENORMIN) 25 MG tablet Take 1 tablet (25 mg total) by mouth 2 (two) times a day.    EScitalopram oxalate (LEXAPRO) 5 MG Tab Take 1 tablet (5 mg total) by mouth once daily.    methIMAzole (TAPAZOLE) 10 MG Tab Take 1 tablet by mouth twice daily for 1 week, then reduce to 1 tablet by mouth once daily. Go to Endocrinologist for adjustments asap.    nicotine (NICODERM CQ) 21 mg/24 hr Place 1 patch onto the skin once daily.    [DISCONTINUED] metoprolol succinate (TOPROL-XL) 25 MG 24 hr tablet Take 1 tablet (25 mg total) by mouth once daily.       Review of patient's allergies indicates:   Allergen Reactions    Ibuprofen Hives and Edema       Past Medical History:   Diagnosis Date    Migraines     Primary hypertension 03/04/2023    Thyroid disease      History reviewed. No pertinent surgical history.  Family History    None       Tobacco Use    Smoking status: Every Day     Packs/day: 2.00     Types: Cigarettes    Smokeless " tobacco: Never    Tobacco comments:     1 pack of cigarettes will last the pt 2 days.   Substance and Sexual Activity    Alcohol use: Yes     Comment: two drinks every 3 days.    Drug use: Yes     Types: Marijuana     Comment: uses marijuasa daily     Sexual activity: Yes     Partners: Male     Review of Systems   Constitutional:  Negative for chills.   HENT: Negative.     Respiratory: Negative.     Cardiovascular: Negative.    Gastrointestinal:  Positive for abdominal pain, diarrhea, nausea and vomiting.   Objective:     Vital Signs (Most Recent):  Temp: 99.6 °F (37.6 °C) (06/08/23 2345)  Pulse: (!) 113 (06/09/23 0131)  Resp: 20 (06/09/23 0211)  BP: (!) 161/92 (06/09/23 0131)  SpO2: 98 % (06/09/23 0131) Vital Signs (24h Range):  Temp:  [99.6 °F (37.6 °C)] 99.6 °F (37.6 °C)  Pulse:  [113-126] 113  Resp:  [18-20] 20  SpO2:  [98 %-99 %] 98 %  BP: (161-167)/(80-97) 161/92     Weight: 53.5 kg (118 lb)  Body mass index is 21.58 kg/m².      Intake/Output Summary (Last 24 hours) at 6/9/2023 0227  Last data filed at 6/9/2023 0217  Gross per 24 hour   Intake 1000 ml   Output --   Net 1000 ml       Physical Exam  Constitutional:       Appearance: Normal appearance.   HENT:      Head: Normocephalic.   Eyes:      Extraocular Movements: Extraocular movements intact.   Cardiovascular:      Rate and Rhythm: Regular rhythm. Tachycardia present.   Pulmonary:      Effort: Pulmonary effort is normal. No respiratory distress.   Abdominal:      General: There is no distension.      Palpations: Abdomen is soft.      Tenderness: There is no abdominal tenderness.   Musculoskeletal:         General: Normal range of motion.   Skin:     General: Skin is warm and dry.   Neurological:      General: No focal deficit present.      Mental Status: She is alert and oriented to person, place, and time.   Psychiatric:         Mood and Affect: Mood normal.         Behavior: Behavior normal.     Significant Labs:  Recent Lab Results  (Last 5 results in  the past 24 hours)        06/09/23  0737   06/09/23  0648   06/09/23  0512   06/09/23  0209   06/09/23  0205        Benzodiazepines         Negative       Methadone metabolites         Negative       Phencyclidine         Negative       Amphetamine Screen, Ur         Negative       Anion Gap   12             Appearance, UA         Clear       Bacteria, UA         Many       Barbiturate Screen, Ur         Negative       Bilirubin (UA)         Negative       BUN   15             Calcium   9.7             Chloride   101             CO2   24             Cocaine (Metab.)         Negative       Color, UA         Yellow       Creatinine   0.6             Creatinine, Urine         84.4       eGFR   >60             Glucose   99             Glucose, UA         Negative       Hyaline Casts, UA         6       Ketones, UA         1+       Leukocytes, UA         Negative       Magnesium   1.7             Microscopic Comment         SEE COMMENT  Comment: Other formed elements not mentioned in the report are not   present in the microscopic examination.          NITRITE UA         Positive       Occult Blood UA         Trace       Opiate Scrn, Ur         Presumptive Positive       pH, UA         7.0       Phosphorus   5.2             POCT Glucose 103     113           Potassium   4.2             Preg Test, Ur       Negative         Protein, UA         3+  Comment: Recommend a 24 hour urine protein or a urine   protein/creatinine ratio if globulin induced proteinuria is  clinically suspected.          Acceptable       Yes         RBC, UA         1       Sodium   137             Specific Gravity, UA         1.020       Specimen UA         Urine, Clean Catch       Marijuana (THC) Metabolite         Presumptive Positive       Toxicology Information         SEE COMMENT  Comment: This screen includes the following classes of drugs at the listed   cut-off:    Benzodiazepines 200 ng/ml  Methadone 300 ng/ml  Cocaine  metabolite 300 ng/ml  Opiates 300 ng/ml  Barbiturates 200 ng/ml  Amphetamines 1000 ng/ml  Marijuana metabs (THC) 50 ng/ml  Phencyclidine (PCP) 25 ng/ml    This is a screening test. If results do not correlate with clinical   presentation, then a confirmatory send out test is advised.     This report is intended for use in clinical monitoring and management   of   patients. It is not intended for use in employment related drug   testing.         UROBILINOGEN UA         Negative       WBC, UA         2                              Significant Diagnostics:  I have reviewed all pertinent imaging results/findings within the past 24 hours.    Assessment/Plan:     Active Diagnoses:    Diagnosis Date Noted POA    Thyrotoxicosis with thyrotoxic crisis [E05.91] 03/04/2023 Yes    Primary hypertension [I10] 03/04/2023 Yes    Cardiomegaly [I51.7] 03/04/2023 Yes      Problems Resolved During this Admission:     Ms. Caro is a 44yo female who presents with thyrotoxicosis with classic symptoms of the disease of abdominal pain, nausea, emesis, and diarrhea.  CT imaging consistent with findings of GI irritation.  General surgery consulted for small bowel obstruction.  No signs of a small bowel obstructions.    Thank you for your consult. General surgery will sign off.      In terms of her hyperthyroid state.  Would gain control with medical management and can follow up with endocrinology and surgery as an outpatient basis when desiring surgical intervention.    Ty Gan MD  General Surgery  [facility] - [place] - Emergency Dept

## 2023-06-09 NOTE — NURSING
Pt arrived to unit via wheelchair. Pt assisted to bed. AAOx4. C/o chest pain rated at 10/10. EKG completed. VS charted per flowsheet. Dr. Hope notified. Telephone order given for Morphine 2mg and Oxygen 2L via NC. Pt placed on O2.

## 2023-06-10 LAB
ALBUMIN SERPL BCP-MCNC: 3.4 G/DL (ref 3.5–5.2)
ALP SERPL-CCNC: 355 U/L (ref 55–135)
ALT SERPL W/O P-5'-P-CCNC: 26 U/L (ref 10–44)
ANION GAP SERPL CALC-SCNC: 8 MMOL/L (ref 8–16)
AST SERPL-CCNC: 23 U/L (ref 10–40)
BASOPHILS # BLD AUTO: 0.02 K/UL (ref 0–0.2)
BASOPHILS NFR BLD: 0.4 % (ref 0–1.9)
BILIRUB SERPL-MCNC: 0.8 MG/DL (ref 0.1–1)
BUN SERPL-MCNC: 9 MG/DL (ref 6–20)
CALCIUM SERPL-MCNC: 9.6 MG/DL (ref 8.7–10.5)
CHLORIDE SERPL-SCNC: 104 MMOL/L (ref 95–110)
CO2 SERPL-SCNC: 26 MMOL/L (ref 23–29)
CREAT SERPL-MCNC: 0.5 MG/DL (ref 0.5–1.4)
DIFFERENTIAL METHOD: ABNORMAL
EOSINOPHIL # BLD AUTO: 0 K/UL (ref 0–0.5)
EOSINOPHIL NFR BLD: 0.2 % (ref 0–8)
ERYTHROCYTE [DISTWIDTH] IN BLOOD BY AUTOMATED COUNT: 13.7 % (ref 11.5–14.5)
EST. GFR  (NO RACE VARIABLE): >60 ML/MIN/1.73 M^2
GLUCOSE SERPL-MCNC: 140 MG/DL (ref 70–110)
HCT VFR BLD AUTO: 42.5 % (ref 37–48.5)
HGB BLD-MCNC: 13.5 G/DL (ref 12–16)
IMM GRANULOCYTES # BLD AUTO: 0 K/UL (ref 0–0.04)
IMM GRANULOCYTES NFR BLD AUTO: 0 % (ref 0–0.5)
LYMPHOCYTES # BLD AUTO: 2 K/UL (ref 1–4.8)
LYMPHOCYTES NFR BLD: 39.6 % (ref 18–48)
MAGNESIUM SERPL-MCNC: 1.6 MG/DL (ref 1.6–2.6)
MCH RBC QN AUTO: 22.7 PG (ref 27–31)
MCHC RBC AUTO-ENTMCNC: 31.8 G/DL (ref 32–36)
MCV RBC AUTO: 72 FL (ref 82–98)
MONOCYTES # BLD AUTO: 0.5 K/UL (ref 0.3–1)
MONOCYTES NFR BLD: 9.9 % (ref 4–15)
NEUTROPHILS # BLD AUTO: 2.5 K/UL (ref 1.8–7.7)
NEUTROPHILS NFR BLD: 49.9 % (ref 38–73)
NRBC BLD-RTO: 0 /100 WBC
PHOSPHATE SERPL-MCNC: 4.7 MG/DL (ref 2.7–4.5)
PLATELET # BLD AUTO: 227 K/UL (ref 150–450)
PMV BLD AUTO: 10.9 FL (ref 9.2–12.9)
POCT GLUCOSE: 126 MG/DL (ref 70–110)
POCT GLUCOSE: 137 MG/DL (ref 70–110)
POCT GLUCOSE: 150 MG/DL (ref 70–110)
POCT GLUCOSE: 181 MG/DL (ref 70–110)
POTASSIUM SERPL-SCNC: 4 MMOL/L (ref 3.5–5.1)
PROT SERPL-MCNC: 7.2 G/DL (ref 6–8.4)
RBC # BLD AUTO: 5.94 M/UL (ref 4–5.4)
SODIUM SERPL-SCNC: 138 MMOL/L (ref 136–145)
T3 SERPL-MCNC: 245 NG/DL (ref 60–180)
T4 FREE SERPL-MCNC: 3.35 NG/DL (ref 0.71–1.51)
TSH SERPL DL<=0.005 MIU/L-ACNC: <0.01 UIU/ML (ref 0.4–4)
WBC # BLD AUTO: 5.07 K/UL (ref 3.9–12.7)

## 2023-06-10 PROCEDURE — 84100 ASSAY OF PHOSPHORUS: CPT | Performed by: STUDENT IN AN ORGANIZED HEALTH CARE EDUCATION/TRAINING PROGRAM

## 2023-06-10 PROCEDURE — 11000001 HC ACUTE MED/SURG PRIVATE ROOM

## 2023-06-10 PROCEDURE — 85025 COMPLETE CBC W/AUTO DIFF WBC: CPT | Performed by: STUDENT IN AN ORGANIZED HEALTH CARE EDUCATION/TRAINING PROGRAM

## 2023-06-10 PROCEDURE — 96361 HYDRATE IV INFUSION ADD-ON: CPT

## 2023-06-10 PROCEDURE — 80053 COMPREHEN METABOLIC PANEL: CPT | Performed by: STUDENT IN AN ORGANIZED HEALTH CARE EDUCATION/TRAINING PROGRAM

## 2023-06-10 PROCEDURE — 25000003 PHARM REV CODE 250: Performed by: INTERNAL MEDICINE

## 2023-06-10 PROCEDURE — 25000003 PHARM REV CODE 250: Performed by: STUDENT IN AN ORGANIZED HEALTH CARE EDUCATION/TRAINING PROGRAM

## 2023-06-10 PROCEDURE — 96366 THER/PROPH/DIAG IV INF ADDON: CPT

## 2023-06-10 PROCEDURE — 84443 ASSAY THYROID STIM HORMONE: CPT | Performed by: STUDENT IN AN ORGANIZED HEALTH CARE EDUCATION/TRAINING PROGRAM

## 2023-06-10 PROCEDURE — 96367 TX/PROPH/DG ADDL SEQ IV INF: CPT

## 2023-06-10 PROCEDURE — 36415 COLL VENOUS BLD VENIPUNCTURE: CPT | Performed by: STUDENT IN AN ORGANIZED HEALTH CARE EDUCATION/TRAINING PROGRAM

## 2023-06-10 PROCEDURE — 84480 ASSAY TRIIODOTHYRONINE (T3): CPT | Performed by: STUDENT IN AN ORGANIZED HEALTH CARE EDUCATION/TRAINING PROGRAM

## 2023-06-10 PROCEDURE — 63600175 PHARM REV CODE 636 W HCPCS: Performed by: INTERNAL MEDICINE

## 2023-06-10 PROCEDURE — 83735 ASSAY OF MAGNESIUM: CPT | Performed by: STUDENT IN AN ORGANIZED HEALTH CARE EDUCATION/TRAINING PROGRAM

## 2023-06-10 PROCEDURE — G0378 HOSPITAL OBSERVATION PER HR: HCPCS

## 2023-06-10 PROCEDURE — 84439 ASSAY OF FREE THYROXINE: CPT | Performed by: STUDENT IN AN ORGANIZED HEALTH CARE EDUCATION/TRAINING PROGRAM

## 2023-06-10 RX ADMIN — PIPERACILLIN AND TAZOBACTAM 4.5 G: 4; .5 INJECTION, POWDER, LYOPHILIZED, FOR SOLUTION INTRAVENOUS; PARENTERAL at 04:06

## 2023-06-10 RX ADMIN — METHIMAZOLE 10 MG: 5 TABLET ORAL at 02:06

## 2023-06-10 RX ADMIN — METHIMAZOLE 10 MG: 5 TABLET ORAL at 08:06

## 2023-06-10 RX ADMIN — PROPRANOLOL HYDROCHLORIDE 20 MG: 10 TABLET ORAL at 05:06

## 2023-06-10 RX ADMIN — LOPERAMIDE HYDROCHLORIDE 2 MG: 2 CAPSULE ORAL at 02:06

## 2023-06-10 RX ADMIN — SODIUM CHLORIDE: 9 INJECTION, SOLUTION INTRAVENOUS at 02:06

## 2023-06-10 RX ADMIN — PIPERACILLIN AND TAZOBACTAM 4.5 G: 4; .5 INJECTION, POWDER, LYOPHILIZED, FOR SOLUTION INTRAVENOUS; PARENTERAL at 11:06

## 2023-06-10 RX ADMIN — ESCITALOPRAM 5 MG: 5 TABLET, FILM COATED ORAL at 08:06

## 2023-06-10 RX ADMIN — PROPRANOLOL HYDROCHLORIDE 20 MG: 10 TABLET ORAL at 12:06

## 2023-06-10 RX ADMIN — PROPRANOLOL HYDROCHLORIDE 20 MG: 10 TABLET ORAL at 11:06

## 2023-06-10 RX ADMIN — LOPERAMIDE HYDROCHLORIDE 2 MG: 2 CAPSULE ORAL at 08:06

## 2023-06-10 RX ADMIN — HYDROCORTISONE 50 MG: 10 TABLET ORAL at 09:06

## 2023-06-10 RX ADMIN — HYDROCORTISONE 50 MG: 10 TABLET ORAL at 02:06

## 2023-06-10 RX ADMIN — HYDROCORTISONE 50 MG: 10 TABLET ORAL at 05:06

## 2023-06-10 RX ADMIN — PIPERACILLIN AND TAZOBACTAM 4.5 G: 4; .5 INJECTION, POWDER, LYOPHILIZED, FOR SOLUTION INTRAVENOUS; PARENTERAL at 08:06

## 2023-06-10 NOTE — NURSING
Pt resting in bed, no complaints of pain/discomfort. Scheduled medications given. Cont IVF. Tele #[id number]. Pt ambulates to bathroom with standby assist; pt remains free from fall/injury. Vitals assessed. Accu checks w/ no insulin coverage. Pt on room air; no distress noted. Safety measures maintained. Will cont to monitor

## 2023-06-10 NOTE — SUBJECTIVE & OBJECTIVE
Interval History: Pt sleeping on evaluation. Refuses to answer questions    Review of Systems  Objective:     Vital Signs (Most Recent):  Temp: 98.5 °F (36.9 °C) (06/10/23 1121)  Pulse: 104 (06/10/23 1121)  Resp: 18 (06/10/23 1121)  BP: (!) 159/94 (06/10/23 1121)  SpO2: 100 % (06/10/23 1121) Vital Signs (24h Range):  Temp:  [98 °F (36.7 °C)-98.7 °F (37.1 °C)] 98.5 °F (36.9 °C)  Pulse:  [] 104  Resp:  [17-18] 18  SpO2:  [92 %-100 %] 100 %  BP: (158-188)/() 159/94     Weight: 49.9 kg (110 lb)  Body mass index is 20.12 kg/m².    Intake/Output Summary (Last 24 hours) at 6/10/2023 1424  Last data filed at 6/10/2023 1330  Gross per 24 hour   Intake 600 ml   Output 0 ml   Net 600 ml         Physical Exam  Constitutional:       General: She is not in acute distress.     Appearance: She is not toxic-appearing.   HENT:      Head: Normocephalic and atraumatic.   Pulmonary:      Effort: Pulmonary effort is normal. No respiratory distress.   Musculoskeletal:         General: No swelling or tenderness.   Skin:     General: Skin is warm and dry.   Neurological:      Mental Status: She is alert.           Significant Labs: All pertinent labs within the past 24 hours have been reviewed.    Significant Imaging: I have reviewed all pertinent imaging results/findings within the past 24 hours.

## 2023-06-10 NOTE — NURSING
[facility] [place]  Nurses Note -- 4 Eyes      6/10/2023       Skin assessed on: Q Shift      [x] No Pressure Injuries Present    []Prevention Measures Documented    [] Yes LDA  for Pressure Injury Previously documented     [] Yes New Pressure Injury Discovered   [] LDA for New Pressure Injury Added      Attending RN:  Katie Yusuf, TIM     Second RN:  Sirisha Cai LPN

## 2023-06-10 NOTE — NURSING
New IV placed, 20G L hand. Pt tolerated well. Cont IVF. Safety measures maintained. Will cont to monitor

## 2023-06-11 VITALS
SYSTOLIC BLOOD PRESSURE: 133 MMHG | TEMPERATURE: 98 F | WEIGHT: 110 LBS | DIASTOLIC BLOOD PRESSURE: 92 MMHG | HEART RATE: 114 BPM | BODY MASS INDEX: 20.24 KG/M2 | HEIGHT: 62 IN | RESPIRATION RATE: 18 BRPM | OXYGEN SATURATION: 98 %

## 2023-06-11 LAB
ALBUMIN SERPL BCP-MCNC: 3.3 G/DL (ref 3.5–5.2)
ALP SERPL-CCNC: 323 U/L (ref 55–135)
ALT SERPL W/O P-5'-P-CCNC: 29 U/L (ref 10–44)
ANION GAP SERPL CALC-SCNC: 11 MMOL/L (ref 8–16)
AST SERPL-CCNC: 30 U/L (ref 10–40)
BASOPHILS # BLD AUTO: 0.01 K/UL (ref 0–0.2)
BASOPHILS NFR BLD: 0.2 % (ref 0–1.9)
BILIRUB SERPL-MCNC: 0.6 MG/DL (ref 0.1–1)
BUN SERPL-MCNC: 12 MG/DL (ref 6–20)
CALCIUM SERPL-MCNC: 9.5 MG/DL (ref 8.7–10.5)
CHLORIDE SERPL-SCNC: 98 MMOL/L (ref 95–110)
CO2 SERPL-SCNC: 28 MMOL/L (ref 23–29)
CREAT SERPL-MCNC: 0.6 MG/DL (ref 0.5–1.4)
DIFFERENTIAL METHOD: ABNORMAL
E COLI SXT1 STL QL IA: NEGATIVE
E COLI SXT2 STL QL IA: NEGATIVE
EOSINOPHIL # BLD AUTO: 0 K/UL (ref 0–0.5)
EOSINOPHIL NFR BLD: 0.2 % (ref 0–8)
ERYTHROCYTE [DISTWIDTH] IN BLOOD BY AUTOMATED COUNT: 13.4 % (ref 11.5–14.5)
EST. GFR  (NO RACE VARIABLE): >60 ML/MIN/1.73 M^2
GLUCOSE SERPL-MCNC: 173 MG/DL (ref 70–110)
HCT VFR BLD AUTO: 43.7 % (ref 37–48.5)
HGB BLD-MCNC: 14 G/DL (ref 12–16)
IMM GRANULOCYTES # BLD AUTO: 0 K/UL (ref 0–0.04)
IMM GRANULOCYTES NFR BLD AUTO: 0 % (ref 0–0.5)
LYMPHOCYTES # BLD AUTO: 1.4 K/UL (ref 1–4.8)
LYMPHOCYTES NFR BLD: 33.7 % (ref 18–48)
MAGNESIUM SERPL-MCNC: 1.6 MG/DL (ref 1.6–2.6)
MCH RBC QN AUTO: 23.7 PG (ref 27–31)
MCHC RBC AUTO-ENTMCNC: 32 G/DL (ref 32–36)
MCV RBC AUTO: 74 FL (ref 82–98)
MONOCYTES # BLD AUTO: 0.3 K/UL (ref 0.3–1)
MONOCYTES NFR BLD: 6.7 % (ref 4–15)
NEUTROPHILS # BLD AUTO: 2.5 K/UL (ref 1.8–7.7)
NEUTROPHILS NFR BLD: 59.2 % (ref 38–73)
NRBC BLD-RTO: 0 /100 WBC
PHOSPHATE SERPL-MCNC: 4.4 MG/DL (ref 2.7–4.5)
PLATELET # BLD AUTO: 216 K/UL (ref 150–450)
PMV BLD AUTO: 11.2 FL (ref 9.2–12.9)
POCT GLUCOSE: 104 MG/DL (ref 70–110)
POCT GLUCOSE: 122 MG/DL (ref 70–110)
POCT GLUCOSE: 123 MG/DL (ref 70–110)
POTASSIUM SERPL-SCNC: 3.8 MMOL/L (ref 3.5–5.1)
PROT SERPL-MCNC: 7.1 G/DL (ref 6–8.4)
RBC # BLD AUTO: 5.9 M/UL (ref 4–5.4)
SODIUM SERPL-SCNC: 137 MMOL/L (ref 136–145)
T4 FREE SERPL-MCNC: 2.61 NG/DL (ref 0.71–1.51)
TSH SERPL DL<=0.005 MIU/L-ACNC: <0.01 UIU/ML (ref 0.4–4)
WBC # BLD AUTO: 4.21 K/UL (ref 3.9–12.7)

## 2023-06-11 PROCEDURE — 96367 TX/PROPH/DG ADDL SEQ IV INF: CPT

## 2023-06-11 PROCEDURE — 36415 COLL VENOUS BLD VENIPUNCTURE: CPT | Performed by: STUDENT IN AN ORGANIZED HEALTH CARE EDUCATION/TRAINING PROGRAM

## 2023-06-11 PROCEDURE — 84443 ASSAY THYROID STIM HORMONE: CPT | Performed by: STUDENT IN AN ORGANIZED HEALTH CARE EDUCATION/TRAINING PROGRAM

## 2023-06-11 PROCEDURE — 25000003 PHARM REV CODE 250: Performed by: INTERNAL MEDICINE

## 2023-06-11 PROCEDURE — 80053 COMPREHEN METABOLIC PANEL: CPT | Performed by: STUDENT IN AN ORGANIZED HEALTH CARE EDUCATION/TRAINING PROGRAM

## 2023-06-11 PROCEDURE — 85025 COMPLETE CBC W/AUTO DIFF WBC: CPT | Performed by: STUDENT IN AN ORGANIZED HEALTH CARE EDUCATION/TRAINING PROGRAM

## 2023-06-11 PROCEDURE — 63600175 PHARM REV CODE 636 W HCPCS: Performed by: INTERNAL MEDICINE

## 2023-06-11 PROCEDURE — G0378 HOSPITAL OBSERVATION PER HR: HCPCS

## 2023-06-11 PROCEDURE — 96366 THER/PROPH/DIAG IV INF ADDON: CPT

## 2023-06-11 PROCEDURE — 25000003 PHARM REV CODE 250: Performed by: STUDENT IN AN ORGANIZED HEALTH CARE EDUCATION/TRAINING PROGRAM

## 2023-06-11 PROCEDURE — 11000001 HC ACUTE MED/SURG PRIVATE ROOM

## 2023-06-11 PROCEDURE — 96376 TX/PRO/DX INJ SAME DRUG ADON: CPT

## 2023-06-11 PROCEDURE — 25000003 PHARM REV CODE 250: Performed by: NURSE PRACTITIONER

## 2023-06-11 PROCEDURE — 83735 ASSAY OF MAGNESIUM: CPT | Performed by: STUDENT IN AN ORGANIZED HEALTH CARE EDUCATION/TRAINING PROGRAM

## 2023-06-11 PROCEDURE — 84100 ASSAY OF PHOSPHORUS: CPT | Performed by: STUDENT IN AN ORGANIZED HEALTH CARE EDUCATION/TRAINING PROGRAM

## 2023-06-11 PROCEDURE — 84480 ASSAY TRIIODOTHYRONINE (T3): CPT | Performed by: STUDENT IN AN ORGANIZED HEALTH CARE EDUCATION/TRAINING PROGRAM

## 2023-06-11 PROCEDURE — 83520 IMMUNOASSAY QUANT NOS NONAB: CPT | Performed by: STUDENT IN AN ORGANIZED HEALTH CARE EDUCATION/TRAINING PROGRAM

## 2023-06-11 PROCEDURE — 84439 ASSAY OF FREE THYROXINE: CPT | Performed by: STUDENT IN AN ORGANIZED HEALTH CARE EDUCATION/TRAINING PROGRAM

## 2023-06-11 RX ORDER — HYDROCORTISONE 10 MG/1
50 TABLET ORAL 2 TIMES DAILY
Status: DISCONTINUED | OUTPATIENT
Start: 2023-06-11 | End: 2023-06-12 | Stop reason: HOSPADM

## 2023-06-11 RX ORDER — TRAMADOL HYDROCHLORIDE 50 MG/1
50 TABLET ORAL ONCE
Status: COMPLETED | OUTPATIENT
Start: 2023-06-11 | End: 2023-06-11

## 2023-06-11 RX ORDER — HYDROCODONE BITARTRATE AND ACETAMINOPHEN 5; 325 MG/1; MG/1
1 TABLET ORAL EVERY 6 HOURS PRN
Status: DISCONTINUED | OUTPATIENT
Start: 2023-06-11 | End: 2023-06-12 | Stop reason: HOSPADM

## 2023-06-11 RX ORDER — TALC
6 POWDER (GRAM) TOPICAL ONCE
Status: COMPLETED | OUTPATIENT
Start: 2023-06-11 | End: 2023-06-11

## 2023-06-11 RX ORDER — HYDROCHLOROTHIAZIDE 25 MG/1
25 TABLET ORAL DAILY
Status: DISCONTINUED | OUTPATIENT
Start: 2023-06-11 | End: 2023-06-12 | Stop reason: HOSPADM

## 2023-06-11 RX ADMIN — PROPRANOLOL HYDROCHLORIDE 20 MG: 10 TABLET ORAL at 05:06

## 2023-06-11 RX ADMIN — ESCITALOPRAM 5 MG: 5 TABLET, FILM COATED ORAL at 08:06

## 2023-06-11 RX ADMIN — Medication 6 MG: at 12:06

## 2023-06-11 RX ADMIN — METHIMAZOLE 10 MG: 5 TABLET ORAL at 08:06

## 2023-06-11 RX ADMIN — PIPERACILLIN AND TAZOBACTAM 4.5 G: 4; .5 INJECTION, POWDER, LYOPHILIZED, FOR SOLUTION INTRAVENOUS; PARENTERAL at 05:06

## 2023-06-11 RX ADMIN — HYDROCORTISONE 50 MG: 10 TABLET ORAL at 05:06

## 2023-06-11 RX ADMIN — LOPERAMIDE HYDROCHLORIDE 2 MG: 2 CAPSULE ORAL at 11:06

## 2023-06-11 RX ADMIN — TRAMADOL HYDROCHLORIDE 50 MG: 50 TABLET, COATED ORAL at 12:06

## 2023-06-11 RX ADMIN — METHIMAZOLE 10 MG: 5 TABLET ORAL at 03:06

## 2023-06-11 RX ADMIN — PROPRANOLOL HYDROCHLORIDE 20 MG: 10 TABLET ORAL at 12:06

## 2023-06-11 RX ADMIN — HYDROCHLOROTHIAZIDE 25 MG: 25 TABLET ORAL at 11:06

## 2023-06-11 RX ADMIN — PIPERACILLIN AND TAZOBACTAM 4.5 G: 4; .5 INJECTION, POWDER, LYOPHILIZED, FOR SOLUTION INTRAVENOUS; PARENTERAL at 09:06

## 2023-06-11 RX ADMIN — PROPRANOLOL HYDROCHLORIDE 20 MG: 10 TABLET ORAL at 11:06

## 2023-06-11 RX ADMIN — HYDROCODONE BITARTRATE AND ACETAMINOPHEN 1 TABLET: 5; 325 TABLET ORAL at 11:06

## 2023-06-11 RX ADMIN — PIPERACILLIN AND TAZOBACTAM 4.5 G: 4; .5 INJECTION, POWDER, LYOPHILIZED, FOR SOLUTION INTRAVENOUS; PARENTERAL at 11:06

## 2023-06-11 NOTE — SUBJECTIVE & OBJECTIVE
Interval History: Pt awake today states she is doing fine. Discussed plan for continued monitoring of thyroid function with plan to transition medications for discharge in the next couple of days.    Review of Systems  Objective:     Vital Signs (Most Recent):  Temp: 98.2 °F (36.8 °C) (06/11/23 0732)  Pulse: 89 (06/11/23 0732)  Resp: 19 (06/11/23 0732)  BP: (!) 170/96 (06/11/23 0732)  SpO2: 95 % (06/11/23 0732) Vital Signs (24h Range):  Temp:  [97.6 °F (36.4 °C)-98.5 °F (36.9 °C)] 98.2 °F (36.8 °C)  Pulse:  [] 89  Resp:  [18-19] 19  SpO2:  [95 %-100 %] 95 %  BP: (136-170)/() 170/96     Weight: 49.9 kg (110 lb)  Body mass index is 20.12 kg/m².    Intake/Output Summary (Last 24 hours) at 6/11/2023 1013  Last data filed at 6/10/2023 1730  Gross per 24 hour   Intake 360 ml   Output --   Net 360 ml         Physical Exam      Constitutional:       General: She is not in acute distress.     Appearance: She is not toxic-appearing.   HENT:      Head: Normocephalic and atraumatic.   Pulmonary:      Effort: Pulmonary effort is normal. No respiratory distress.   Musculoskeletal:         General: No swelling or tenderness.   Skin:     General: Skin is warm and dry.   Neurological:      Mental Status: She is alert.   Significant Labs: All pertinent labs within the past 24 hours have been reviewed.    Significant Imaging: I have reviewed all pertinent imaging results/findings within the past 24 hours.

## 2023-06-11 NOTE — NURSING
[facility]  Nurses Note -- 4 Eyes      6/11/2023       Skin assessed on: Q Shift      [x] No Pressure Injuries Present    [x]Prevention Measures Documented    [] Yes LDA  for Pressure Injury Previously documented     [] Yes New Pressure Injury Discovered   [] LDA for New Pressure Injury Added      Attending RN:  Pilar Franco, RN     Second RN:  Constance

## 2023-06-11 NOTE — NURSING
Pt requested something for sleep and pain; secure chat GEM Menon NP. Order placed for tramadol 50mg and melatonin 6mg, once. Will cont to monitor

## 2023-06-11 NOTE — ASSESSMENT & PLAN NOTE
Thyrotoxicosis Management     Step 1:   Methimazole 10 mg TID   Propranolol 20 mg po q6 hours   Hydrocortisone 50 mg po q8 hours decreased to bid   SSKI eye drops, 3-4 drops po 3 to 4 times per day   Check thyrotropin receptor antibody   Repeat TSH, T3, and T4 level daily     Step 2:   Methimazole 10 mg BID   Discontinue propranolol and start atenolol 25 mg daily    Wean hydrocortisone to 25 mg b.i.d. after that, then d/c   Okay to discontinue the eyedrops   Patient can likely discharge after tapering of hydrocortisone

## 2023-06-11 NOTE — NURSING
Refusing SCDs at this time.  Reports having to get up frequently with loose stools.  Stated that she has had 8 loose BMs today, notified Dr Tovar

## 2023-06-11 NOTE — NURSING
New IV placed 22G L wrist. IV antibiotics restarted. Safety measures maintained. Will cont to monitor

## 2023-06-11 NOTE — PROGRESS NOTES
[facility] Medicine  Progress Note    Patient Name: Morgan Caro  MRN: [id number]  Patient Class: IP- Inpatient   Admission Date: 6/8/2023  Length of Stay: 2 days  Attending Physician: Chase Tovar III, MD  Primary Care Provider: [facility]        Subjective:     Principal Problem:Thyrotoxicosis with thyrotoxic crisis        HPI:  Ms Caro Is a 43-year-old  female being admitted for evaluation of generally feeling ill, diffuse abdominal discomfort, nausea vomiting diarrhea, palpitations and chest discomfort.  Medical history significant for hypothyroidism, hypertension, migraines, and she has had previous admission related to similar situation back in March 2023 for thyrotoxicosis status for which she was admitted in the intensive care unit.  Before that hospitalization, she had an admission back in [facility] for 2 days and had left against medical advice.  During her previous March hospitalization, she again left against medical advice .  She was given prescription upon discharge for medications and reports that she took some, however has not been otherwise adherent or compliant with recommended medical regimen.  She has not had outpatient follow-up with any particular physicians.  She continues to smoke.  Upon arrival in the ER, her TFTs are consistent again with thyrotoxicosis.  CT scan shows possible enteritis, and there is mentioning of small-bowel obstruction or ileus however she is having loose bowel movements.  Her urinalysis shows many bacteria, and nitrites positive, although she denies any particular urinary symptoms she does have abdominal pain.  She is being admitted for further evaluation        Overview/Hospital Course:  No notes on file    Interval History: Pt awake today states she is doing fine. Discussed plan for continued monitoring of thyroid function with plan to transition medications for discharge in the next couple of days.    Review of  Systems  Objective:     Vital Signs (Most Recent):  Temp: 98.2 °F (36.8 °C) (06/11/23 0732)  Pulse: 89 (06/11/23 0732)  Resp: 19 (06/11/23 0732)  BP: (!) 170/96 (06/11/23 0732)  SpO2: 95 % (06/11/23 0732) Vital Signs (24h Range):  Temp:  [97.6 °F (36.4 °C)-98.5 °F (36.9 °C)] 98.2 °F (36.8 °C)  Pulse:  [] 89  Resp:  [18-19] 19  SpO2:  [95 %-100 %] 95 %  BP: (136-170)/() 170/96     Weight: 49.9 kg (110 lb)  Body mass index is 20.12 kg/m².    Intake/Output Summary (Last 24 hours) at 6/11/2023 1013  Last data filed at 6/10/2023 1730  Gross per 24 hour   Intake 360 ml   Output --   Net 360 ml         Physical Exam      Constitutional:       General: She is not in acute distress.     Appearance: She is not toxic-appearing.   HENT:      Head: Normocephalic and atraumatic.   Pulmonary:      Effort: Pulmonary effort is normal. No respiratory distress.   Musculoskeletal:         General: No swelling or tenderness.   Skin:     General: Skin is warm and dry.   Neurological:      Mental Status: She is alert.   Significant Labs: All pertinent labs within the past 24 hours have been reviewed.    Significant Imaging: I have reviewed all pertinent imaging results/findings within the past 24 hours.      Assessment/Plan:      * Thyrotoxicosis with thyrotoxic crisis  Thyrotoxicosis Management     Step 1:   Methimazole 10 mg TID   Propranolol 20 mg po q6 hours   Hydrocortisone 50 mg po q8 hours decreased to bid   SSKI eye drops, 3-4 drops po 3 to 4 times per day   Check thyrotropin receptor antibody   Repeat TSH, T3, and T4 level daily     Step 2:   Methimazole 10 mg BID   Discontinue propranolol and start atenolol 25 mg daily    Wean hydrocortisone to 25 mg b.i.d. after that, then d/c   Okay to discontinue the eyedrops   Patient can likely discharge after tapering of hydrocortisone     Acute UTI  Continue IV Zosyn for now. Denies particular urinary symptoms but does complain of diffuse abdominal pain with  possible enteritis, pending stool studies.      Enteritis  No further episodes    Migraines  Chronic. Not on any other meds except beta blocker. No flare at this time.    Tobacco abuse counseling  Provided smoking cessation counseling for 7 minutes including discussion of risks of ongoing tobacco abuse and offered treatments such as nicotine patch, which could be appropriate after resolution of her thyrotoxicosis.        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  Recommend outpatient Cardiology follow-up      Primary hypertension   Continue propranolol.  Switch back to atenolol on discharge.        VTE Risk Mitigation (From admission, onward)         Ordered     enoxaparin injection 40 mg  Daily         06/09/23 0329     IP VTE HIGH RISK PATIENT  Once         06/09/23 0329     Place sequential compression device  Until discontinued         06/09/23 0329                Discharge Planning   FLORY: 6/12/2023     Code Status: Full Code   Is the patient medically ready for discharge?:     Reason for patient still in hospital (select all that apply): Laboratory test and Treatment                     Chase Tovar III, MD  Department of Hospital Medicine   [facility] [place] [facility]

## 2023-06-12 LAB
BACTERIA STL CULT: NORMAL
T3 SERPL-MCNC: 222 NG/DL (ref 60–180)

## 2023-06-12 NOTE — NURSING
Pt refused scheduled medications, hydrocortisone 50mg and methimazole 10mg; pt stated its making her sick. Notified DANIEL Dickinson. Will cont to monitor

## 2023-06-12 NOTE — NURSING
@[id number] pt requesting to leave AMA. pt stated she is tired of being here and she wants to leave. Notified Dr Mac.   @[phone] MD at bedside. AMA form signed. IV and tele removed. Safety measures maintained.

## 2023-06-12 NOTE — PLAN OF CARE
Patient refusing all care, and wishes to sign out AGAINST MEDICAL ADVICE.    Went to develop to the patient at bedside, sitting comfortably at bedside and understands risks of leaving AMA especially at this time of the night.  Risks including death and disability discussed, and she verbalized understanding and still wishes to leave AMA.  At this time, she has full capacity to make her own decisions.  AMA paperwork filled and discussed with nursing staff.

## 2023-06-13 LAB
BACTERIA BLD CULT: NORMAL
BACTERIA BLD CULT: NORMAL
TSH RECEP AB SER-ACNC: 12 IU/L (ref 0–1.75)

## 2023-06-14 ENCOUNTER — HOSPITAL ENCOUNTER (OUTPATIENT)
Facility: HOSPITAL | Age: 43
Discharge: HOME | DRG: 644 | End: 2023-06-18
Attending: EMERGENCY MEDICINE | Admitting: STUDENT IN AN ORGANIZED HEALTH CARE EDUCATION/TRAINING PROGRAM
Payer: MEDICAID

## 2023-06-14 DIAGNOSIS — R79.89 POSITIVE D DIMER: ICD-10-CM

## 2023-06-14 DIAGNOSIS — R41.82 ALTERED MENTAL STATUS, UNSPECIFIED ALTERED MENTAL STATUS TYPE: ICD-10-CM

## 2023-06-14 DIAGNOSIS — R55 SYNCOPE, UNSPECIFIED SYNCOPE TYPE: ICD-10-CM

## 2023-06-14 DIAGNOSIS — R07.9 CHEST PAIN: ICD-10-CM

## 2023-06-14 DIAGNOSIS — R93.0 ABNORMAL CT OF THE HEAD: ICD-10-CM

## 2023-06-14 DIAGNOSIS — R00.0 TACHYCARDIA: ICD-10-CM

## 2023-06-14 DIAGNOSIS — R55 SYNCOPE AND COLLAPSE: ICD-10-CM

## 2023-06-14 DIAGNOSIS — E05.01 THYROTOXICOSIS WITH DIFFUSE GOITER AND THYROID STORM: Primary | ICD-10-CM

## 2023-06-14 LAB
ALBUMIN SERPL BCP-MCNC: 3.7 G/DL (ref 3.5–5.2)
ALLENS TEST: ABNORMAL
ALLENS TEST: NORMAL
ALP SERPL-CCNC: 328 U/L (ref 55–135)
ALT SERPL W/O P-5'-P-CCNC: 46 U/L (ref 10–44)
ANION GAP SERPL CALC-SCNC: 13 MMOL/L (ref 8–16)
AST SERPL-CCNC: 39 U/L (ref 10–40)
BASOPHILS # BLD AUTO: 0.04 K/UL (ref 0–0.2)
BASOPHILS NFR BLD: 0.7 % (ref 0–1.9)
BILIRUB SERPL-MCNC: 1.1 MG/DL (ref 0.1–1)
BNP SERPL-MCNC: 16 PG/ML (ref 0–99)
BUN SERPL-MCNC: 18 MG/DL (ref 6–20)
CALCIUM SERPL-MCNC: 9.3 MG/DL (ref 8.7–10.5)
CHLORIDE SERPL-SCNC: 97 MMOL/L (ref 95–110)
CO2 SERPL-SCNC: 29 MMOL/L (ref 23–29)
CREAT SERPL-MCNC: 0.7 MG/DL (ref 0.5–1.4)
D DIMER PPP IA.FEU-MCNC: 0.74 MG/L FEU
DELSYS: ABNORMAL
DELSYS: NORMAL
DIFFERENTIAL METHOD: ABNORMAL
EOSINOPHIL # BLD AUTO: 0.1 K/UL (ref 0–0.5)
EOSINOPHIL NFR BLD: 1.9 % (ref 0–8)
ERYTHROCYTE [DISTWIDTH] IN BLOOD BY AUTOMATED COUNT: 13.2 % (ref 11.5–14.5)
EST. GFR  (NO RACE VARIABLE): >60 ML/MIN/1.73 M^2
ETHANOL SERPL-MCNC: <10 MG/DL
GLUCOSE SERPL-MCNC: 122 MG/DL (ref 70–110)
HCG INTACT+B SERPL-ACNC: <1.2 MIU/ML
HCO3 UR-SCNC: 34.1 MMOL/L (ref 24–28)
HCT VFR BLD AUTO: 46.5 % (ref 37–48.5)
HGB BLD-MCNC: 14.6 G/DL (ref 12–16)
IMM GRANULOCYTES # BLD AUTO: 0.03 K/UL (ref 0–0.04)
IMM GRANULOCYTES NFR BLD AUTO: 0.5 % (ref 0–0.5)
LDH SERPL L TO P-CCNC: 1.95 MMOL/L (ref 0.5–2.2)
LIPASE SERPL-CCNC: 32 U/L (ref 4–60)
LYMPHOCYTES # BLD AUTO: 2.9 K/UL (ref 1–4.8)
LYMPHOCYTES NFR BLD: 50.8 % (ref 18–48)
MAGNESIUM SERPL-MCNC: 1.7 MG/DL (ref 1.6–2.6)
MCH RBC QN AUTO: 22.7 PG (ref 27–31)
MCHC RBC AUTO-ENTMCNC: 31.4 G/DL (ref 32–36)
MCV RBC AUTO: 72 FL (ref 82–98)
MONOCYTES # BLD AUTO: 0.8 K/UL (ref 0.3–1)
MONOCYTES NFR BLD: 13.5 % (ref 4–15)
NEUTROPHILS # BLD AUTO: 1.9 K/UL (ref 1.8–7.7)
NEUTROPHILS NFR BLD: 32.6 % (ref 38–73)
NRBC BLD-RTO: 0 /100 WBC
PCO2 BLDA: 63.7 MMHG (ref 35–45)
PH SMN: 7.34 [PH] (ref 7.35–7.45)
PLATELET # BLD AUTO: 176 K/UL (ref 150–450)
PMV BLD AUTO: 11.9 FL (ref 9.2–12.9)
PO2 BLDA: 21 MMHG (ref 40–60)
POC BE: 6 MMOL/L
POC SATURATED O2: 29 % (ref 95–100)
POC TCO2: 36 MMOL/L (ref 24–29)
POCT GLUCOSE: 139 MG/DL (ref 70–110)
POTASSIUM SERPL-SCNC: 3.5 MMOL/L (ref 3.5–5.1)
PROT SERPL-MCNC: 7.6 G/DL (ref 6–8.4)
RBC # BLD AUTO: 6.44 M/UL (ref 4–5.4)
SAMPLE: ABNORMAL
SAMPLE: NORMAL
SITE: ABNORMAL
SITE: NORMAL
SODIUM SERPL-SCNC: 139 MMOL/L (ref 136–145)
T4 FREE SERPL-MCNC: 2.86 NG/DL (ref 0.71–1.51)
TROPONIN I SERPL DL<=0.01 NG/ML-MCNC: 0.04 NG/ML (ref 0–0.03)
TSH SERPL DL<=0.005 MIU/L-ACNC: <0.01 UIU/ML (ref 0.4–4)
WBC # BLD AUTO: 5.69 K/UL (ref 3.9–12.7)

## 2023-06-14 PROCEDURE — 93010 EKG 12-LEAD: ICD-10-PCS | Mod: ,,, | Performed by: INTERNAL MEDICINE

## 2023-06-14 PROCEDURE — 84439 ASSAY OF FREE THYROXINE: CPT | Performed by: EMERGENCY MEDICINE

## 2023-06-14 PROCEDURE — 83880 ASSAY OF NATRIURETIC PEPTIDE: CPT | Performed by: EMERGENCY MEDICINE

## 2023-06-14 PROCEDURE — 80053 COMPREHEN METABOLIC PANEL: CPT | Performed by: EMERGENCY MEDICINE

## 2023-06-14 PROCEDURE — 85025 COMPLETE CBC W/AUTO DIFF WBC: CPT | Performed by: EMERGENCY MEDICINE

## 2023-06-14 PROCEDURE — 85379 FIBRIN DEGRADATION QUANT: CPT | Performed by: EMERGENCY MEDICINE

## 2023-06-14 PROCEDURE — 82962 GLUCOSE BLOOD TEST: CPT

## 2023-06-14 PROCEDURE — 82803 BLOOD GASES ANY COMBINATION: CPT

## 2023-06-14 PROCEDURE — 84484 ASSAY OF TROPONIN QUANT: CPT | Performed by: EMERGENCY MEDICINE

## 2023-06-14 PROCEDURE — 96365 THER/PROPH/DIAG IV INF INIT: CPT

## 2023-06-14 PROCEDURE — 83605 ASSAY OF LACTIC ACID: CPT

## 2023-06-14 PROCEDURE — 96374 THER/PROPH/DIAG INJ IV PUSH: CPT | Mod: 59

## 2023-06-14 PROCEDURE — 25000003 PHARM REV CODE 250: Performed by: EMERGENCY MEDICINE

## 2023-06-14 PROCEDURE — 99285 EMERGENCY DEPT VISIT HI MDM: CPT | Mod: 25

## 2023-06-14 PROCEDURE — 82077 ASSAY SPEC XCP UR&BREATH IA: CPT | Performed by: EMERGENCY MEDICINE

## 2023-06-14 PROCEDURE — 82800 BLOOD PH: CPT | Mod: 59

## 2023-06-14 PROCEDURE — 99291 CRITICAL CARE FIRST HOUR: CPT | Mod: 25

## 2023-06-14 PROCEDURE — G0378 HOSPITAL OBSERVATION PER HR: HCPCS

## 2023-06-14 PROCEDURE — 84702 CHORIONIC GONADOTROPIN TEST: CPT | Performed by: EMERGENCY MEDICINE

## 2023-06-14 PROCEDURE — 63600175 PHARM REV CODE 636 W HCPCS: Performed by: EMERGENCY MEDICINE

## 2023-06-14 PROCEDURE — 84443 ASSAY THYROID STIM HORMONE: CPT | Performed by: EMERGENCY MEDICINE

## 2023-06-14 PROCEDURE — 83690 ASSAY OF LIPASE: CPT | Performed by: EMERGENCY MEDICINE

## 2023-06-14 PROCEDURE — 99900035 HC TECH TIME PER 15 MIN (STAT)

## 2023-06-14 PROCEDURE — 83735 ASSAY OF MAGNESIUM: CPT | Performed by: EMERGENCY MEDICINE

## 2023-06-14 PROCEDURE — 87040 BLOOD CULTURE FOR BACTERIA: CPT | Mod: 59 | Performed by: EMERGENCY MEDICINE

## 2023-06-14 PROCEDURE — 93010 ELECTROCARDIOGRAM REPORT: CPT | Mod: ,,, | Performed by: INTERNAL MEDICINE

## 2023-06-14 PROCEDURE — 96361 HYDRATE IV INFUSION ADD-ON: CPT

## 2023-06-14 PROCEDURE — 12000002 HC ACUTE/MED SURGE SEMI-PRIVATE ROOM

## 2023-06-14 PROCEDURE — 93005 ELECTROCARDIOGRAM TRACING: CPT

## 2023-06-14 PROCEDURE — 96376 TX/PRO/DX INJ SAME DRUG ADON: CPT | Mod: 59

## 2023-06-14 RX ORDER — PROPRANOLOL HYDROCHLORIDE 1 MG/ML
1 INJECTION INTRAVENOUS ONCE
Status: COMPLETED | OUTPATIENT
Start: 2023-06-14 | End: 2023-06-14

## 2023-06-14 RX ORDER — PROPYLTHIOURACIL 50 MG/1
500 TABLET ORAL ONCE
Status: COMPLETED | OUTPATIENT
Start: 2023-06-14 | End: 2023-06-14

## 2023-06-14 RX ORDER — PROPRANOLOL HYDROCHLORIDE 1 MG/ML
1 INJECTION INTRAVENOUS ONCE
Status: COMPLETED | OUTPATIENT
Start: 2023-06-15 | End: 2023-06-14

## 2023-06-14 RX ADMIN — PROPYLTHIOURACIL 500 MG: 50 TABLET ORAL at 11:06

## 2023-06-14 RX ADMIN — CEFTRIAXONE 2 G: 2 INJECTION, POWDER, FOR SOLUTION INTRAMUSCULAR; INTRAVENOUS at 09:06

## 2023-06-14 RX ADMIN — PROPRANOLOL HYDROCHLORIDE 1 MG: 1 INJECTION, SOLUTION INTRAVENOUS at 11:06

## 2023-06-14 RX ADMIN — PROPRANOLOL HYDROCHLORIDE 1 MG: 1 INJECTION, SOLUTION INTRAVENOUS at 10:06

## 2023-06-14 RX ADMIN — SODIUM CHLORIDE 1500 ML: 9 INJECTION, SOLUTION INTRAVENOUS at 09:06

## 2023-06-14 NOTE — DISCHARGE SUMMARY
[facility] Medicine  Discharge Summary      Patient Name: Morgan Caro  MRN: [id number]  [facility]: [id number]  Patient Class: IP- Inpatient  Admission Date: 6/8/2023  Hospital Length of Stay: 4 days  Discharge Date and Time: 6/12/2023 12:20 AM  Attending Physician: No att. providers found   Discharging Provider: Chase Tovar III, MD  Primary Care Provider: [facility]    Primary Care Team: Networked reference to record PCT     HPI:   Ms Caro Is a 43-year-old  female being admitted for evaluation of generally feeling ill, diffuse abdominal discomfort, nausea vomiting diarrhea, palpitations and chest discomfort.  Medical history significant for hypothyroidism, hypertension, migraines, and she has had previous admission related to similar situation back in March 2023 for thyrotoxicosis status for which she was admitted in the intensive care unit.  Before that hospitalization, she had an admission back in [facility] for 2 days and had left against medical advice.  During her previous March hospitalization, she again left against medical advice .  She was given prescription upon discharge for medications and reports that she took some, however has not been otherwise adherent or compliant with recommended medical regimen.  She has not had outpatient follow-up with any particular physicians.  She continues to smoke.  Upon arrival in the ER, her TFTs are consistent again with thyrotoxicosis.  CT scan shows possible enteritis, and there is mentioning of small-bowel obstruction or ileus however she is having loose bowel movements.  Her urinalysis shows many bacteria, and nitrites positive, although she denies any particular urinary symptoms she does have abdominal pain.  She is being admitted for further evaluation        * No surgery found *      Hospital Course:   43F admitted for evaluation of generally feeling ill, diffuse abdominal discomfort, nausea vomiting diarrhea,  palpitations and chest discomfort.  Medical history significant for hypothyroidism, hypertension, migraines, and she has had previous admission related to similar situation back in March 2023 for thyrotoxicosis status for which she was admitted in the intensive care unit.  Before that hospitalization, she had an admission back in [facility] for 2 days and had left against medical advice.  During her previous March hospitalization, she again left against medical advice .  She was given prescription upon discharge for medications and reports that she took some, however has not been otherwise adherent or compliant with recommended medical regimen. Upon arrival in the ER, her TFTs are consistent again with thyrotoxicosis.  CT scan shows possible enteritis, and there is mentioning of small-bowel obstruction or ileus however she is having loose bowel movements.  Her urinalysis shows many bacteria, and nitrites positive, although she denies any particular urinary symptoms she does have abdominal pain.  She is being admitted for further evaluation. Pt started on tx for thyrotoxicosis and had mild improvement in lab values on supplementation, but on the night of 6/12 patient refusing all care, and wishes to sign out AGAINST MEDICAL ADVICE. Went to develop to the patient at bedside, sitting comfortably at bedside and understands risks of leaving AMA especially at this time of the night.  Risks including death and disability discussed, and she verbalized understanding and still wishes to leave AMA.  At this time, she has full capacity to make her own decisions.  AMA paperwork filled and discussed with nursing staff.          Goals of Care Treatment Preferences:  Code Status: Full Code      Consults:   Consults (From admission, onward)        Status Ordering Provider     Inpatient consult to General Surgery  Once        Provider:  Henrry Montoya MD    Completed MICHAEL JULIEN          No new Assessment & Plan notes have been  filed under this hospital service since the last note was generated.  Service: Hospital Medicine    Final Active Diagnoses:    Diagnosis Date Noted POA    PRINCIPAL PROBLEM:  Thyrotoxicosis with thyrotoxic crisis [E05.91] 03/04/2023 Yes    Tobacco abuse counseling [Z71.6] 06/09/2023 Not Applicable    Migraines [G43.909] 06/09/2023 Yes    Enteritis [K52.9] 06/09/2023 Yes    Acute UTI [N39.0] 06/09/2023 Yes    Primary hypertension [I10] 03/04/2023 Yes    Cardiomegaly [I51.7] 03/04/2023 Yes      Problems Resolved During this Admission:       Discharged Condition: against medical advice    Disposition: Left Against Medical Adv*    Follow Up:    Patient Instructions:   No discharge procedures on file.    Significant Diagnostic Studies: N/A    Pending Diagnostic Studies:     None         Medications:  Reconciled Home Medications:      Medication List      ASK your doctor about these medications    atenoloL 25 MG tablet  Commonly known as: TENORMIN  Take 1 tablet (25 mg total) by mouth 2 (two) times a day.     EScitalopram oxalate 5 MG Tab  Commonly known as: LEXAPRO  Take 1 tablet (5 mg total) by mouth once daily.     methIMAzole 10 MG Tab  Commonly known as: TAPAZOLE  Take 1 tablet by mouth twice daily for 1 week, then reduce to 1 tablet by mouth once daily. Go to Endocrinologist for adjustments asap.     nicotine 21 mg/24 hr  Commonly known as: NICODERM CQ  Place 1 patch onto the skin once daily.            Indwelling Lines/Drains at time of discharge:   Lines/Drains/Airways     None                 Time spent on the discharge of patient: 15 minutes         Chase Tovar III, MD  Department of Hospital Medicine  [facility]

## 2023-06-14 NOTE — HOSPITAL COURSE
43F admitted for evaluation of generally feeling ill, diffuse abdominal discomfort, nausea vomiting diarrhea, palpitations and chest discomfort.  Medical history significant for hypothyroidism, hypertension, migraines, and she has had previous admission related to similar situation back in March 2023 for thyrotoxicosis status for which she was admitted in the intensive care unit.  Before that hospitalization, she had an admission back in [facility] for 2 days and had left against medical advice.  During her previous March hospitalization, she again left against medical advice .  She was given prescription upon discharge for medications and reports that she took some, however has not been otherwise adherent or compliant with recommended medical regimen. Upon arrival in the ER, her TFTs are consistent again with thyrotoxicosis.  CT scan shows possible enteritis, and there is mentioning of small-bowel obstruction or ileus however she is having loose bowel movements.  Her urinalysis shows many bacteria, and nitrites positive, although she denies any particular urinary symptoms she does have abdominal pain.  She is being admitted for further evaluation. Pt started on tx for thyrotoxicosis and had mild improvement in lab values on supplementation, but on the night of 6/12 patient refusing all care, and wishes to sign out AGAINST MEDICAL ADVICE. Went to develop to the patient at bedside, sitting comfortably at bedside and understands risks of leaving AMA especially at this time of the night.  Risks including death and disability discussed, and she verbalized understanding and still wishes to leave AMA.  At this time, she has full capacity to make her own decisions.  AMA paperwork filled and discussed with nursing staff.

## 2023-06-15 PROBLEM — R55 SYNCOPE AND COLLAPSE: Status: ACTIVE | Noted: 2023-06-15

## 2023-06-15 PROBLEM — Z91.199 NONCOMPLIANCE BY REFUSING INTERVENTION OR SUPPORT: Status: ACTIVE | Noted: 2023-06-15

## 2023-06-15 PROBLEM — R93.0 ABNORMAL CT OF THE HEAD: Status: ACTIVE | Noted: 2023-06-15

## 2023-06-15 LAB
ANION GAP SERPL CALC-SCNC: 7 MMOL/L (ref 8–16)
APAP SERPL-MCNC: <3 UG/ML (ref 10–20)
ASCENDING AORTA: 3.17 CM
AV INDEX (PROSTH): 0.86
AV MEAN GRADIENT: 4 MMHG
AV PEAK GRADIENT: 7 MMHG
AV REGURGITATION PRESSURE HALF TIME: 573.77 MS
AV VALVE AREA: 3.08 CM2
AV VELOCITY RATIO: 0.9
BILIRUB UR QL STRIP: NEGATIVE
BSA FOR ECHO PROCEDURE: 1.46 M2
BUN SERPL-MCNC: 23 MG/DL (ref 6–20)
CALCIUM SERPL-MCNC: 9.3 MG/DL (ref 8.7–10.5)
CHLORIDE SERPL-SCNC: 101 MMOL/L (ref 95–110)
CLARITY UR: CLEAR
CO2 SERPL-SCNC: 29 MMOL/L (ref 23–29)
COLOR UR: YELLOW
CREAT SERPL-MCNC: 0.6 MG/DL (ref 0.5–1.4)
CV ECHO LV RWT: 0.33 CM
DOP CALC AO PEAK VEL: 1.32 M/S
DOP CALC AO VTI: 24.3 CM
DOP CALC LVOT AREA: 3.6 CM2
DOP CALC LVOT DIAMETER: 2.13 CM
DOP CALC LVOT PEAK VEL: 1.19 M/S
DOP CALC LVOT STROKE VOLUME: 74.79 CM3
DOP CALC MV VTI: 14.8 CM
DOP CALCLVOT PEAK VEL VTI: 21 CM
E WAVE DECELERATION TIME: 182.53 MSEC
E/A RATIO: 0.79
E/E' RATIO: 5.88 M/S
ECHO LV POSTERIOR WALL: 0.76 CM (ref 0.6–1.1)
EJECTION FRACTION: 60 %
EST. GFR  (NO RACE VARIABLE): >60 ML/MIN/1.73 M^2
FRACTIONAL SHORTENING: 37 % (ref 28–44)
GLUCOSE SERPL-MCNC: 205 MG/DL (ref 70–110)
GLUCOSE UR QL STRIP: ABNORMAL
HGB UR QL STRIP: NEGATIVE
INTERVENTRICULAR SEPTUM: 0.84 CM (ref 0.6–1.1)
IVC DIAMETER: 1.69 CM
KETONES UR QL STRIP: NEGATIVE
LA MAJOR: 4.67 CM
LA MINOR: 4.88 CM
LA WIDTH: 4.7 CM
LACTATE SERPL-SCNC: 2.1 MMOL/L (ref 0.5–2.2)
LEFT ATRIUM SIZE: 2.42 CM
LEFT ATRIUM VOLUME INDEX: 31.4 ML/M2
LEFT ATRIUM VOLUME: 46.14 CM3
LEFT INTERNAL DIMENSION IN SYSTOLE: 2.91 CM (ref 2.1–4)
LEFT VENTRICLE DIASTOLIC VOLUME INDEX: 66.59 ML/M2
LEFT VENTRICLE DIASTOLIC VOLUME: 97.88 ML
LEFT VENTRICLE MASS INDEX: 81 G/M2
LEFT VENTRICLE SYSTOLIC VOLUME INDEX: 22.1 ML/M2
LEFT VENTRICLE SYSTOLIC VOLUME: 32.49 ML
LEFT VENTRICULAR INTERNAL DIMENSION IN DIASTOLE: 4.61 CM (ref 3.5–6)
LEFT VENTRICULAR MASS: 118.34 G
LEUKOCYTE ESTERASE UR QL STRIP: NEGATIVE
LV LATERAL E/E' RATIO: 4.55 M/S
LV SEPTAL E/E' RATIO: 8.33 M/S
LVOT MG: 2.85 MMHG
LVOT MV: 0.8 CM/S
MAGNESIUM SERPL-MCNC: 1.6 MG/DL (ref 1.6–2.6)
MV MEAN GRADIENT: 1 MMHG
MV PEAK A VEL: 0.63 M/S
MV PEAK E VEL: 0.5 M/S
MV PEAK GRADIENT: 2 MMHG
MV STENOSIS PRESSURE HALF TIME: 52.93 MS
MV VALVE AREA BY CONTINUITY EQUATION: 5.05 CM2
MV VALVE AREA P 1/2 METHOD: 4.16 CM2
NITRITE UR QL STRIP: NEGATIVE
PH UR STRIP: 7 [PH] (ref 5–8)
PISA AR MAX VEL: 1.92 M/S
PISA TR MAX VEL: 2.24 M/S
POCT GLUCOSE: 162 MG/DL (ref 70–110)
POCT GLUCOSE: 308 MG/DL (ref 70–110)
POTASSIUM SERPL-SCNC: 4.5 MMOL/L (ref 3.5–5.1)
PROT UR QL STRIP: NEGATIVE
PULM VEIN S/D RATIO: 1.26
PV PEAK D VEL: 0.42 M/S
PV PEAK S VEL: 0.53 M/S
PV PEAK VELOCITY: 1.47 CM/S
RA MAJOR: 4.21 CM
RA PRESSURE: 8 MMHG
RA WIDTH: 4 CM
RIGHT VENTRICULAR END-DIASTOLIC DIMENSION: 4.91 CM
RV TISSUE DOPPLER FREE WALL SYSTOLIC VELOCITY 1 (APICAL 4 CHAMBER VIEW): 10.59 CM/S
SINUS: 3.34 CM
SODIUM SERPL-SCNC: 137 MMOL/L (ref 136–145)
SP GR UR STRIP: 1.01 (ref 1–1.03)
STJ: 2.81 CM
TDI LATERAL: 0.11 M/S
TDI SEPTAL: 0.06 M/S
TDI: 0.09 M/S
TR MAX PG: 20 MMHG
TRICUSPID ANNULAR PLANE SYSTOLIC EXCURSION: 2.45 CM
TV REST PULMONARY ARTERY PRESSURE: 28 MMHG
URN SPEC COLLECT METH UR: ABNORMAL
UROBILINOGEN UR STRIP-ACNC: NEGATIVE EU/DL

## 2023-06-15 PROCEDURE — 25000003 PHARM REV CODE 250: Performed by: EMERGENCY MEDICINE

## 2023-06-15 PROCEDURE — 96376 TX/PRO/DX INJ SAME DRUG ADON: CPT

## 2023-06-15 PROCEDURE — 83605 ASSAY OF LACTIC ACID: CPT | Performed by: EMERGENCY MEDICINE

## 2023-06-15 PROCEDURE — 99222 PR INITIAL HOSPITAL CARE,LEVL II: ICD-10-PCS | Mod: AF,HB,95, | Performed by: PSYCHIATRY & NEUROLOGY

## 2023-06-15 PROCEDURE — 96372 THER/PROPH/DIAG INJ SC/IM: CPT | Performed by: INTERNAL MEDICINE

## 2023-06-15 PROCEDURE — 96375 TX/PRO/DX INJ NEW DRUG ADDON: CPT

## 2023-06-15 PROCEDURE — 80143 DRUG ASSAY ACETAMINOPHEN: CPT | Performed by: EMERGENCY MEDICINE

## 2023-06-15 PROCEDURE — 63600175 PHARM REV CODE 636 W HCPCS: Performed by: EMERGENCY MEDICINE

## 2023-06-15 PROCEDURE — 83735 ASSAY OF MAGNESIUM: CPT | Performed by: INTERNAL MEDICINE

## 2023-06-15 PROCEDURE — 96361 HYDRATE IV INFUSION ADD-ON: CPT

## 2023-06-15 PROCEDURE — G0378 HOSPITAL OBSERVATION PER HR: HCPCS

## 2023-06-15 PROCEDURE — 21400001 HC TELEMETRY ROOM

## 2023-06-15 PROCEDURE — 99222 1ST HOSP IP/OBS MODERATE 55: CPT | Mod: AF,HB,95, | Performed by: PSYCHIATRY & NEUROLOGY

## 2023-06-15 PROCEDURE — 80048 BASIC METABOLIC PNL TOTAL CA: CPT | Performed by: INTERNAL MEDICINE

## 2023-06-15 PROCEDURE — 25000003 PHARM REV CODE 250: Performed by: STUDENT IN AN ORGANIZED HEALTH CARE EDUCATION/TRAINING PROGRAM

## 2023-06-15 PROCEDURE — 96375 TX/PRO/DX INJ NEW DRUG ADDON: CPT | Mod: 59

## 2023-06-15 PROCEDURE — 36415 COLL VENOUS BLD VENIPUNCTURE: CPT | Performed by: INTERNAL MEDICINE

## 2023-06-15 PROCEDURE — 63600175 PHARM REV CODE 636 W HCPCS: Performed by: INTERNAL MEDICINE

## 2023-06-15 PROCEDURE — 25000003 PHARM REV CODE 250: Performed by: INTERNAL MEDICINE

## 2023-06-15 PROCEDURE — 81003 URINALYSIS AUTO W/O SCOPE: CPT | Performed by: EMERGENCY MEDICINE

## 2023-06-15 RX ORDER — ENOXAPARIN SODIUM 100 MG/ML
40 INJECTION SUBCUTANEOUS EVERY 24 HOURS
Status: DISCONTINUED | OUTPATIENT
Start: 2023-06-15 | End: 2023-06-18 | Stop reason: HOSPADM

## 2023-06-15 RX ORDER — QUETIAPINE FUMARATE 25 MG/1
50 TABLET, FILM COATED ORAL NIGHTLY
Status: DISCONTINUED | OUTPATIENT
Start: 2023-06-15 | End: 2023-06-16

## 2023-06-15 RX ORDER — SODIUM CHLORIDE 0.9 % (FLUSH) 0.9 %
10 SYRINGE (ML) INJECTION
Status: DISCONTINUED | OUTPATIENT
Start: 2023-06-15 | End: 2023-06-18 | Stop reason: HOSPADM

## 2023-06-15 RX ORDER — GLUCAGON 1 MG
1 KIT INJECTION
Status: DISCONTINUED | OUTPATIENT
Start: 2023-06-15 | End: 2023-06-18 | Stop reason: HOSPADM

## 2023-06-15 RX ORDER — ONDANSETRON 2 MG/ML
4 INJECTION INTRAMUSCULAR; INTRAVENOUS EVERY 8 HOURS PRN
Status: DISCONTINUED | OUTPATIENT
Start: 2023-06-15 | End: 2023-06-18 | Stop reason: HOSPADM

## 2023-06-15 RX ORDER — OXYCODONE AND ACETAMINOPHEN 5; 325 MG/1; MG/1
1 TABLET ORAL
Status: COMPLETED | OUTPATIENT
Start: 2023-06-15 | End: 2023-06-15

## 2023-06-15 RX ORDER — PROPRANOLOL HYDROCHLORIDE 10 MG/1
30 TABLET ORAL 3 TIMES DAILY
Status: DISCONTINUED | OUTPATIENT
Start: 2023-06-15 | End: 2023-06-15

## 2023-06-15 RX ORDER — DEXAMETHASONE SODIUM PHOSPHATE 4 MG/ML
4 INJECTION, SOLUTION INTRA-ARTICULAR; INTRALESIONAL; INTRAMUSCULAR; INTRAVENOUS; SOFT TISSUE
Status: COMPLETED | OUTPATIENT
Start: 2023-06-15 | End: 2023-06-15

## 2023-06-15 RX ORDER — LOPERAMIDE HYDROCHLORIDE 2 MG/1
2 CAPSULE ORAL 2 TIMES DAILY PRN
Status: DISCONTINUED | OUTPATIENT
Start: 2023-06-15 | End: 2023-06-18 | Stop reason: HOSPADM

## 2023-06-15 RX ORDER — PROPRANOLOL HYDROCHLORIDE 10 MG/1
10 TABLET ORAL 3 TIMES DAILY
Status: DISCONTINUED | OUTPATIENT
Start: 2023-06-15 | End: 2023-06-16

## 2023-06-15 RX ORDER — ACETAMINOPHEN 325 MG/1
650 TABLET ORAL EVERY 4 HOURS PRN
Status: DISCONTINUED | OUTPATIENT
Start: 2023-06-15 | End: 2023-06-18 | Stop reason: HOSPADM

## 2023-06-15 RX ORDER — IBUPROFEN 200 MG
24 TABLET ORAL
Status: DISCONTINUED | OUTPATIENT
Start: 2023-06-15 | End: 2023-06-18 | Stop reason: HOSPADM

## 2023-06-15 RX ORDER — NALOXONE HCL 0.4 MG/ML
0.02 VIAL (ML) INJECTION
Status: DISCONTINUED | OUTPATIENT
Start: 2023-06-15 | End: 2023-06-18 | Stop reason: HOSPADM

## 2023-06-15 RX ORDER — LANOLIN ALCOHOL/MO/W.PET/CERES
800 CREAM (GRAM) TOPICAL
Status: DISCONTINUED | OUTPATIENT
Start: 2023-06-15 | End: 2023-06-16

## 2023-06-15 RX ORDER — ACETAMINOPHEN 325 MG/1
650 TABLET ORAL EVERY 8 HOURS PRN
Status: DISCONTINUED | OUTPATIENT
Start: 2023-06-15 | End: 2023-06-18 | Stop reason: HOSPADM

## 2023-06-15 RX ORDER — METHIMAZOLE 5 MG/1
5 TABLET ORAL 3 TIMES DAILY
Status: DISCONTINUED | OUTPATIENT
Start: 2023-06-15 | End: 2023-06-17

## 2023-06-15 RX ORDER — DEXAMETHASONE SODIUM PHOSPHATE 4 MG/ML
4 INJECTION, SOLUTION INTRA-ARTICULAR; INTRALESIONAL; INTRAMUSCULAR; INTRAVENOUS; SOFT TISSUE EVERY 12 HOURS
Status: DISCONTINUED | OUTPATIENT
Start: 2023-06-15 | End: 2023-06-16

## 2023-06-15 RX ORDER — HYDROCODONE BITARTRATE AND ACETAMINOPHEN 5; 325 MG/1; MG/1
1 TABLET ORAL EVERY 6 HOURS PRN
Status: DISCONTINUED | OUTPATIENT
Start: 2023-06-15 | End: 2023-06-18 | Stop reason: HOSPADM

## 2023-06-15 RX ORDER — MAG HYDROX/ALUMINUM HYD/SIMETH 200-200-20
30 SUSPENSION, ORAL (FINAL DOSE FORM) ORAL 4 TIMES DAILY PRN
Status: DISCONTINUED | OUTPATIENT
Start: 2023-06-15 | End: 2023-06-18 | Stop reason: HOSPADM

## 2023-06-15 RX ORDER — OLANZAPINE 10 MG/2ML
5 INJECTION, POWDER, FOR SOLUTION INTRAMUSCULAR ONCE AS NEEDED
Status: DISCONTINUED | OUTPATIENT
Start: 2023-06-15 | End: 2023-06-18 | Stop reason: HOSPADM

## 2023-06-15 RX ORDER — LORAZEPAM 0.5 MG/1
0.5 TABLET ORAL EVERY 6 HOURS PRN
Status: DISCONTINUED | OUTPATIENT
Start: 2023-06-15 | End: 2023-06-18 | Stop reason: HOSPADM

## 2023-06-15 RX ORDER — QUETIAPINE FUMARATE 25 MG/1
25 TABLET, FILM COATED ORAL 2 TIMES DAILY PRN
Status: DISCONTINUED | OUTPATIENT
Start: 2023-06-15 | End: 2023-06-18 | Stop reason: HOSPADM

## 2023-06-15 RX ORDER — SODIUM CHLORIDE, SODIUM LACTATE, POTASSIUM CHLORIDE, CALCIUM CHLORIDE 600; 310; 30; 20 MG/100ML; MG/100ML; MG/100ML; MG/100ML
INJECTION, SOLUTION INTRAVENOUS CONTINUOUS
Status: DISCONTINUED | OUTPATIENT
Start: 2023-06-15 | End: 2023-06-16

## 2023-06-15 RX ORDER — IBUPROFEN 200 MG
16 TABLET ORAL
Status: DISCONTINUED | OUTPATIENT
Start: 2023-06-15 | End: 2023-06-18 | Stop reason: HOSPADM

## 2023-06-15 RX ORDER — LORAZEPAM 2 MG/ML
0.5 INJECTION INTRAMUSCULAR ONCE
Status: COMPLETED | OUTPATIENT
Start: 2023-06-15 | End: 2023-06-15

## 2023-06-15 RX ADMIN — DEXAMETHASONE SODIUM PHOSPHATE 4 MG: 4 INJECTION INTRA-ARTICULAR; INTRALESIONAL; INTRAMUSCULAR; INTRAVENOUS; SOFT TISSUE at 12:06

## 2023-06-15 RX ADMIN — PROPRANOLOL HYDROCHLORIDE 10 MG: 10 TABLET ORAL at 09:06

## 2023-06-15 RX ADMIN — SODIUM CHLORIDE, POTASSIUM CHLORIDE, SODIUM LACTATE AND CALCIUM CHLORIDE: 600; 310; 30; 20 INJECTION, SOLUTION INTRAVENOUS at 03:06

## 2023-06-15 RX ADMIN — LORAZEPAM 0.5 MG: 0.5 TABLET ORAL at 06:06

## 2023-06-15 RX ADMIN — HYDROCODONE BITARTRATE AND ACETAMINOPHEN 1 TABLET: 5; 325 TABLET ORAL at 11:06

## 2023-06-15 RX ADMIN — OXYCODONE AND ACETAMINOPHEN 1 TABLET: 5; 325 TABLET ORAL at 12:06

## 2023-06-15 RX ADMIN — QUETIAPINE 50 MG: 25 TABLET ORAL at 11:06

## 2023-06-15 RX ADMIN — PROPRANOLOL HYDROCHLORIDE 10 MG: 10 TABLET ORAL at 02:06

## 2023-06-15 RX ADMIN — METHIMAZOLE 5 MG: 5 TABLET ORAL at 09:06

## 2023-06-15 RX ADMIN — ENOXAPARIN SODIUM 40 MG: 40 INJECTION SUBCUTANEOUS at 04:06

## 2023-06-15 RX ADMIN — METHIMAZOLE 5 MG: 5 TABLET ORAL at 02:06

## 2023-06-15 RX ADMIN — LOPERAMIDE HYDROCHLORIDE 2 MG: 2 CAPSULE ORAL at 05:06

## 2023-06-15 RX ADMIN — METHIMAZOLE 5 MG: 5 TABLET ORAL at 11:06

## 2023-06-15 RX ADMIN — PROPRANOLOL HYDROCHLORIDE 60 MG: 40 TABLET ORAL at 12:06

## 2023-06-15 RX ADMIN — LOPERAMIDE HYDROCHLORIDE 2 MG: 2 CAPSULE ORAL at 11:06

## 2023-06-15 RX ADMIN — HYDROCODONE BITARTRATE AND ACETAMINOPHEN 1 TABLET: 5; 325 TABLET ORAL at 02:06

## 2023-06-15 RX ADMIN — LORAZEPAM 0.5 MG: 2 INJECTION INTRAMUSCULAR; INTRAVENOUS at 05:06

## 2023-06-15 RX ADMIN — SODIUM CHLORIDE, POTASSIUM CHLORIDE, SODIUM LACTATE AND CALCIUM CHLORIDE: 600; 310; 30; 20 INJECTION, SOLUTION INTRAVENOUS at 12:06

## 2023-06-15 RX ADMIN — DEXAMETHASONE SODIUM PHOSPHATE 4 MG: 4 INJECTION, SOLUTION INTRA-ARTICULAR; INTRALESIONAL; INTRAMUSCULAR; INTRAVENOUS; SOFT TISSUE at 09:06

## 2023-06-15 RX ADMIN — PROPRANOLOL HYDROCHLORIDE 10 MG: 10 TABLET ORAL at 11:06

## 2023-06-15 NOTE — SUBJECTIVE & OBJECTIVE
Past Medical History:   Diagnosis Date    Depression     Migraines     Primary hypertension 03/04/2023    Thyroid disease        No past surgical history on file.    Review of patient's allergies indicates:   Allergen Reactions    Ibuprofen Hives and Edema       No current facility-administered medications on file prior to encounter.     Current Outpatient Medications on File Prior to Encounter   Medication Sig    atenoloL (TENORMIN) 25 MG tablet Take 1 tablet (25 mg total) by mouth 2 (two) times a day.    EScitalopram oxalate (LEXAPRO) 5 MG Tab Take 1 tablet (5 mg total) by mouth once daily.    methIMAzole (TAPAZOLE) 10 MG Tab Take 1 tablet by mouth twice daily for 1 week, then reduce to 1 tablet by mouth once daily. Go to Endocrinologist for adjustments asap.    nicotine (NICODERM CQ) 21 mg/24 hr Place 1 patch onto the skin once daily.    [DISCONTINUED] metoprolol succinate (TOPROL-XL) 25 MG 24 hr tablet Take 1 tablet (25 mg total) by mouth once daily.     Family History    None       Tobacco Use    Smoking status: Every Day     Packs/day: 2.00     Types: Cigarettes    Smokeless tobacco: Never    Tobacco comments:     1 pack of cigarettes will last the pt 2 days.   Substance and Sexual Activity    Alcohol use: Yes     Comment: two drinks every 3 days.    Drug use: Yes     Types: Marijuana     Comment: uses marijuasa daily     Sexual activity: Yes     Partners: Male     Review of Systems  12 point systems reviewed and negative except as mentioned in HPI section.    Objective:     Vital Signs (Most Recent):  Temp: 97.8 °F (36.6 °C) (06/15/23 0210)  Pulse: 90 (06/15/23 0210)  Resp: 19 (06/15/23 0210)  BP: 128/84 (06/15/23 0210)  SpO2: 98 % (06/15/23 0210) Vital Signs (24h Range):  Temp:  [97.4 °F (36.3 °C)-97.8 °F (36.6 °C)] 97.8 °F (36.6 °C)  Pulse:  [] 90  Resp:  [18-26] 19  SpO2:  [97 %-99 %] 98 %  BP: (115-144)/(67-85) 128/84     Weight: 48.7 kg (107 lb 5.8 oz)  Body mass index is 19.64 kg/m².     Physical  Exam   General: Alert ,no apparent cardiorespiratory distress, laying comfortably  Eye: PERRL, normal conjunctiva  HEENT: Normocephalic, atraumatic, dry  oral mucosa  Neck: Supple.  Respiratory: Lungs CTA, equal breath sounds, symmetric expansion, no chest wall tenderness  Cardiovascular: Tachycardic, regular rhythm, no appreciable murmurs rubs or gallops, no peripheral edema, good pulses and equal in all extremities.  Gastrointestinal: Soft, non-tender, non-distended, normal bowel sounds.  Genitourinary: Deferred  Musculoskeletal and Neurologic: Motor function is normal with muscle strength 5/5 bilaterally to upper and lower extremities. Sensation is intact bilaterally. Reflexes 2+ bilaterally  Psychiatric: Appropriate mood and affect.          Significant Labs: All pertinent labs within the past 24 hours have been reviewed.  CBC:   Recent Labs   Lab 06/14/23 2118   WBC 5.69   HGB 14.6   HCT 46.5        CMP:   Recent Labs   Lab 06/14/23 2118      K 3.5   CL 97   CO2 29   *   BUN 18   CREATININE 0.7   CALCIUM 9.3   PROT 7.6   ALBUMIN 3.7   BILITOT 1.1*   ALKPHOS 328*   AST 39   ALT 46*   ANIONGAP 13       Significant Imaging: I have reviewed all pertinent imaging results/findings within the past 24 hours.  I have reviewed and interpreted all pertinent imaging results/findings within the past 24 hours.

## 2023-06-15 NOTE — ED TRIAGE NOTES
Patient presents to ED with complaints of LOC. Patient states that she was out with friends eating crawfish when she passed out. Patient states that she fell backwards hitting her head and back on the ground also complains of chest pain.

## 2023-06-15 NOTE — CONSULTS
"[facility]  Psychiatry  Telepsychiatry Consult Note    Please see previous notes:    Patient agreeable to consultation via telepsychiatry.    Tele-Consultation from Psychiatry started: 6/15/2023 at 2:38 AM    The chief complaint leading to psychiatric consultation is: evaluation for grave disability and SI   This consultation was requested by Dr. Larose, the Emergency Department attending physician.  The location of the consulting psychiatrist is  [place], [place] .  The patient location is  [facility] TELEMETRY   The patient arrived at the ED at: 2033    Also present with the patient at the time of the consultation: [facility]     Patient Identification:   Morgan Caro is a 43 y.o. female.    Patient information was obtained from patient, ER records, and primary team.  Patient presented voluntarily to the Emergency Department     Inpatient consult to Telemedicine - Psychiatry  Consult performed by: Ying Trevino MD  Consult ordered by: Vernon Shay MD      Consult Start Time: 06/15/2023 02:38 CDT        Subjective:     History of Present Illness:   Morgan Caro is a 43 year old woman with history of unspecified depressive disorder, thyrotoxicosis who presented to the ED with syncope.  Patient has a history of refusing medical care, with multiple discharges AMA. Psychiatry was consulted to evaluate patient for safety, with concern her historical refusal of medical care is secondary to depression.    Chart was reviewed. Patient was seen, assessed and discussed with Dr. Mac, the admitting inpatient physician.     Patient states "I'm feeling much better now."    Patient states she had had a lot going on previously, but expresses motivation to get her health together today. States that her five year old granddaughter discovered her passed out today, and she doesn't want them to have to experience this again.  Pt states she has problems sleeping.  Was given lexapro, has taken that regularly, " "feels it has not been helpful for mood. Patient denies trying to harm "anybody". Reports personal stressors are still ongoing; she lost her housing and items that were in storage. Also expresses worry about possible cancer that was seen on head imaging. Currently staying with her mother, will stay in her truck if her mother gets made. Patient states she had a housing voucher that , patient states she didn't have a phone and missed the call to renew it.  Was living with her son (he is currently incarcerated).     Reports history of feeling depressed and having mood swings when she lost her housing. Reports isolating, not wanting to deal with people. Describes current mood as "good". Reports difficulty sleeping prior to hospitalization. Reports worries about current stressors.     Per primary team/admitting physician, patient is expressing desire for discharge tonight (which she historically does).     Psychiatric History:   Previous Psychiatric Hospitalizations: No   Previous Medication Trials: Yes ativan, quetiapine   Previous Suicide Attempts: no   History of Violence: no  History of Depression: no  History of [place]: no  History of Auditory/Visual Hallucination no  History of Delusions: no  Outpatient psychiatrist (current & past): No    Substance Abuse History:  Tobacco:1 ppd  Alcohol: No  Illicit Substances:cannabis  Detox/Rehab: No    Legal History: Past charges/incarcerations: No     Family Psychiatric History: mother with schizophrenia       Social History:  Developmental/Childhood:Achieved all developmental milestones timely  *Education: 12th grade  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)   Relationship Status/Sexual Orientation: Single  Children: 5  Housing Status: Home    history:  NO  Access to gun: NO  Mormonism:Actively participates in organized [employer]      Psychiatric Mental Status Exam:  Arousal: alert  Sensorium/Orientation: oriented to " "grossly intact  Behavior/Cooperation: normal, cooperative   Speech: pressured  Language: not tested  Mood: " good "   Affect:  elevated  Thought Process: racing  Thought Content:   Auditory hallucinations: NO  Visual hallucinations: NO  Paranoia: NO  Delusions:  NO  Suicidal ideation: NO  Homicidal ideation: NO  Attention/Concentration:  intact  Memory:    Recent:  Intact   Remote: Intact     Fund of Knowledge: Vocabulary appropriate      Insight: has awareness of illness  Judgment: impaired due to medical condition      Past Medical History:   Past Medical History:   Diagnosis Date    Depression     Migraines     Primary hypertension 03/04/2023    Thyroid disease       Laboratory Data:   Labs Reviewed   CBC W/ AUTO DIFFERENTIAL - Abnormal; Notable for the following components:       Result Value    RBC 6.44 (*)     MCV 72 (*)     MCH 22.7 (*)     MCHC 31.4 (*)     Gran % 32.6 (*)     Lymph % 50.8 (*)     All other components within normal limits   COMPREHENSIVE METABOLIC PANEL - Abnormal; Notable for the following components:    Glucose 122 (*)     Total Bilirubin 1.1 (*)     Alkaline Phosphatase 328 (*)     ALT 46 (*)     All other components within normal limits   TROPONIN I - Abnormal; Notable for the following components:    Troponin I 0.035 (*)     All other components within normal limits   T4, FREE - Abnormal; Notable for the following components:    Free T4 2.86 (*)     All other components within normal limits   TSH - Abnormal; Notable for the following components:    TSH <0.010 (*)     All other components within normal limits   D DIMER, QUANTITATIVE - Abnormal; Notable for the following components:    D-Dimer 0.74 (*)     All other components within normal limits   ACETAMINOPHEN LEVEL - Abnormal; Notable for the following components:    Acetaminophen (Tylenol), Serum <3.0 (*)     All other components within normal limits   POCT GLUCOSE - Abnormal; Notable for the following components:    POCT Glucose 139 " (*)     All other components within normal limits   ISTAT PROCEDURE - Abnormal; Notable for the following components:    POC PH 7.337 (*)     POC PCO2 63.7 (*)     POC PO2 21 (*)     POC HCO3 34.1 (*)     POC SATURATED O2 29 (*)     POC TCO2 36 (*)     All other components within normal limits   CULTURE, BLOOD   CULTURE, BLOOD   LIPASE   B-TYPE NATRIURETIC PEPTIDE   MAGNESIUM   LACTIC ACID, PLASMA   ALCOHOL,MEDICAL (ETHANOL)   ALCOHOL,MEDICAL (ETHANOL)   HCG, QUANTITATIVE   HCG, QUANTITATIVE   URINALYSIS, REFLEX TO URINE CULTURE   DRUG SCREEN PANEL, URINE EMERGENCY   POCT URINE PREGNANCY   ISTAT LACTATE       Neurological History:  Seizures: No  Head trauma: No    Allergies: reviewed  Review of patient's allergies indicates:   Allergen Reactions    Ibuprofen Hives and Edema       Medications in ER:   Medications   sodium chloride 0.9% bolus 1,497 mL 1,497 mL (0 mLs Intravenous Stopped 6/14/23 2236)   cefTRIAXone (ROCEPHIN) 2 g in dextrose 5 % in water (D5W) 5 % 100 mL IVPB (MB+) (0 g Intravenous Stopped 6/14/23 2243)   propranoloL injection 1 mg (1 mg Intravenous Given 6/14/23 2256)   propylthiouraciL tablet 500 mg (500 mg Oral Given 6/14/23 2357)   propranoloL injection 1 mg (1 mg Intravenous Given 6/14/23 2354)   propranoloL tablet 60 mg (60 mg Oral Given 6/15/23 0030)   oxyCODONE-acetaminophen 5-325 mg per tablet 1 tablet (1 tablet Oral Given 6/15/23 0029)   dexAMETHasone injection 4 mg (4 mg Intravenous Given 6/15/23 0033)       Medications at home: lexapro 5 mg    No new subjective & objective note has been filed under this hospital service since the last note was generated.      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 Currently under treatment for thyrotoxicosis. Patient also received IV steroid in ED (this can also cause patient's to exhibit hypomanic/manic symptoms).     Patient expressing  motivation for treatment and recovery of her medical conditions to writer, but has said to admitting physician she wants to leave. As history of inconsistent treatment due to significant personal stressors and psychosocial instability. Also have high suspicion that her mood fluctuations d/t her thyroid are attributing to her requests for discharge, as she is not able to fully understand risks of her requests.   Patient previously on seroquel that she tolerated. She would benefit from mood stabilizer to target elevated mood given current thyroid condition and necessary treatment.     Would uphold PEC for grave disability for now given patient's mood elevation, racing thoughts and inconsistency with treatment desires. Would reassess patient once mood elevation improves.     Discussed treatment options with patient, to which she agrees. Declines dose of quetiapine now because she doesn't want to miss breakfast in the morning.     Rec:   Continue PEC for grave disability  Medication: quetiapine 25 mg qam, qpm prn for mood elevations, 50 mg qhs for mood and sleep (can escalate dose based on response)  Hold lexapro for now given mood elevation     Time with patient: 25 min      More than 50% of the time was spent counseling/coordinating care    Consulting clinician was informed of the encounter and consult note.    Consultation ended: 6/15/2023 at 3:24 AM      Ying Trevino MD   Psychiatry  [facility]

## 2023-06-15 NOTE — PLAN OF CARE
Case Management Assessment     PCP: [facility]  Pharmacy: [facility]- [address]    Patient Arrived From: home  Existing Help at Home: none    Barriers to Discharge: Mental health    Discharge Plan:    A. Psychiatric hospital   B. Home with family    Pt left AMA on 6/12/23. Pt will discharge to psych facility when medically stable.       06/15/23 1116   Discharge Assessment   Assessment Type Discharge Planning Assessment   Confirmed/corrected address, phone number and insurance Yes   Confirmed Demographics Correct on Facesheet   Source of Information patient   When was your last doctors appointment?   (Pt states it has been awhile.)   Reason For Admission syncope   People in Home parent(s)   Facility Arrived From: home   Do you expect to return to your current living situation? Yes   Do you have help at home or someone to help you manage your care at home? Yes   Who are your caregiver(s) and their phone number(s)? Pat- mother   Prior to hospitilization cognitive status: Alert/Oriented   Current cognitive status: Alert/Oriented   Walking or Climbing Stairs   (none)   Dressing/Bathing   (none)   Equipment Currently Used at Home none   Readmission within 30 days? Yes   Patient currently being followed by outpatient case management? No   Do you currently have service(s) that help you manage your care at home? No   Do you take prescription medications? Yes   Do you have prescription coverage? Yes   Coverage [facility] [employer] [facility]- MEDICAID   Do you have any problems affording any of your prescribed medications? No   Is the patient taking medications as prescribed? yes   Who is going to help you get home at discharge? FAMILY   How do you get to doctors appointments? family or friend will provide   Are you on dialysis? No   Do you take coumadin? No   Discharge Plan A Psychiatric hospital   Discharge Plan B Home with family   DME Needed Upon Discharge  none   Discharge Plan discussed with: Patient   Transition  of Care Barriers None   Physical Activity   On average, how many days per week do you engage in moderate to strenuous exercise (like a brisk walk)? 0 days   On average, how many minutes do you engage in exercise at this level? 0 min   Financial Resource Strain   How hard is it for you to pay for the very basics like food, housing, medical care, and heating? Somewhat   Housing Stability   In the last 12 months, was there a time when you were not able to pay the mortgage or rent on time? N   In the last 12 months, how many places have you lived? 1   In the last 12 months, was there a time when you did not have a steady place to sleep or slept in a shelter (including now)? N   Transportation Needs   In the past 12 months, has lack of transportation kept you from medical appointments or from getting medications? no   In the past 12 months, has lack of transportation kept you from meetings, work, or from getting things needed for daily living? No   Food Insecurity   Within the past 12 months, you worried that your food would run out before you got the money to buy more. Never true   Within the past 12 months, the food you bought just didn't last and you didn't have money to get more. Never true   Stress   Do you feel stress - tense, restless, nervous, or anxious, or unable to sleep at night because your mind is troubled all the time - these days? To some exte   Social Connections   In a typical week, how many times do you talk on the phone with family, friends, or neighbors? Three   How often do you get together with friends or relatives? Three times   How often do you attend Orthodoxy or Temple services? Never   Do you belong to any clubs or organizations such as Orthodoxy groups, unions, fraternal or athletic groups, or school groups? No   How often do you attend meetings of the clubs or organizations you belong to? Never   Are you , , , , never , or living with a partner? Never  starr   Alcohol Use   Q1: How often do you have a drink containing alcohol? Never   Q2: How many drinks containing alcohol do you have on a typical day when you are drinking? None   Q3: How often do you have six or more drinks on one occasion? Never   OTHER   Name(s) of People in Home Pat- mother

## 2023-06-15 NOTE — ASSESSMENT & PLAN NOTE
Apparently the lucencies were not present previously from prior CAT scan.  We will get heme-onc opinion.

## 2023-06-15 NOTE — PLAN OF CARE
Pt states she is fine, but otherwise does not want to participate in interviewing. PEC placed and psychiatry recommended continuing with PEC. Continuing on tx for thyrotoxicosis with hope to transition to po medications for psychiatry admission when stable.

## 2023-06-15 NOTE — ASSESSMENT & PLAN NOTE
No further episodes.  Echo with ef of 60%.   EKG with sinus tachycardia. No STEMI  Mild troponin elevation in setting of tachycardia and  appears likely demand related. No cp or sob noted.    -continue telemetry monitoring

## 2023-06-15 NOTE — NURSING
Report received from night nurse TIM Lopez. Visualized patient and assessed patient's overall condition and appearance. No acute distress noted. Will continue to monitor

## 2023-06-15 NOTE — ED NOTES
Called and faxed PEC to [facility]  office spoke with (Luanne) also PEC scanned into pt chart by registration.

## 2023-06-15 NOTE — NURSING
[facility] [place]  Nurses Note -- 4 Eyes      6/15/2023       Skin assessed on: Q Shift      [x] No Pressure Injuries Present    []Prevention Measures Documented    [] Yes LDA  for Pressure Injury Previously documented     [] Yes New Pressure Injury Discovered   [] LDA for New Pressure Injury Added      Attending RN:  Loly Al LPN     Second RN:  TIM Lopez

## 2023-06-15 NOTE — HPI
Ms Caro is a 43-year-old -American female who presents to the emergency room, after suffering a reported syncopal event.  Unclear if she had any trauma but does complain of back pain, headaches, leg and chest wall pain.  She was recently admitted to the hospital on 6/9/2023 during which time she was admitted for thyrotoxicosis.  Reported medical history significant for hypothyroidism, hypertension, back grains and had a similar presentation back in March 2023 when she was admitted for thyrotoxicosis and was admitted in the ICU at the time.  Before that March hospitalization she had admission back in [facility] for 2 days and left AGAINST MEDICAL ADVICE from there.  After she was stabilized, she again left AGAINST MEDICAL ADVICE on 6/12/2023.    In the ER, she was noted to be initially tachycardic with 122 bpm, with blood pressure 117/70 her CBC appears grossly unremarkable, her BMP shows normal renal function, has elevated alk phos that has been there previously, minimally elevated troponin of 0.035 from demand ischemia, denies any chest pains, her TSH is less than 0.01 with elevated free T4 of 2.86.  Her x-ray of the thoracic spine is negative, lumbar spine is negative, DVT ultrasound negative for VTE, her CT head reveals heterogeneity in the calvarium that is multifocal and scattered with possibility of osseous metastatic disease, CT C-spine is negative for acute fractures.    She received IV propranolol followed by p.o.,  mg in the ER along with IV Decadron with control of her symptoms.  She is being admitted for further evaluation and treatment.

## 2023-06-15 NOTE — H&P
[facility] Medicine  History & Physical    Patient Name: Morgan Caro  MRN: [id number]  Patient Class: IP- Inpatient  Admission Date: 6/14/2023  Attending Physician: Christine Huynh DO   Primary Care Provider: [facility]         Patient information was obtained from patient and ER records.     Subjective:     Principal Problem:<principal problem not specified>    Chief Complaint:   Chief Complaint   Patient presents with    Loss of Consciousness     Pt arrived via ems, pt chief complaint is syncope. Pt states has been having syncopal episodes. Pt had a syncopal episode today, per ems pt hypotensive with a bp 75/44 now after fluid resuscitation was 117/70.        HPI: Ms Caro is a 43-year-old -American female who presents to the emergency room, after suffering a reported syncopal event.  Unclear if she had any trauma but does complain of back pain, headaches, leg and chest wall pain.  She was recently admitted to the hospital on 6/9/2023 during which time she was admitted for thyrotoxicosis.  Reported medical history significant for hypothyroidism, hypertension, back grains and had a similar presentation back in March 2023 when she was admitted for thyrotoxicosis and was admitted in the ICU at the time.  Before that March hospitalization [place] 2 [place] and left AGAINST MEDICAL ADVICE from there.  After she was stabilized, she again left AGAINST MEDICAL ADVICE on 6/12/2023.    In the ER, she was noted to be initially tachycardic with 122 bpm, with blood pressure 117/70 her CBC appears grossly unremarkable, her BMP shows normal renal function, has elevated alk phos that has been there previously, minimally elevated troponin of 0.035 from demand ischemia, denies any chest pains, her TSH is less than 0.01 with elevated free T4 of 2.86.  Her x-ray of the thoracic spine is negative, lumbar spine is negative, DVT ultrasound negative for VTE, her CT  head reveals heterogeneity in the calvarium that is multifocal and scattered with possibility of osseous metastatic disease, CT C-spine is negative for acute fractures.    She received IV propranolol followed by p.o.,  mg in the ER along with IV Decadron with control of her symptoms.  She is being admitted for further evaluation and treatment.      Past Medical History:   Diagnosis Date    Depression     Migraines     Primary hypertension 03/04/2023    Thyroid disease        No past surgical history on file.    Review of patient's allergies indicates:   Allergen Reactions    Ibuprofen Hives and Edema       No current facility-administered medications on file prior to encounter.     Current Outpatient Medications on File Prior to Encounter   Medication Sig    atenoloL (TENORMIN) 25 MG tablet Take 1 tablet (25 mg total) by mouth 2 (two) times a day.    EScitalopram oxalate (LEXAPRO) 5 MG Tab Take 1 tablet (5 mg total) by mouth once daily.    methIMAzole (TAPAZOLE) 10 MG Tab Take 1 tablet by mouth twice daily for 1 week, then reduce to 1 tablet by mouth once daily. Go to Endocrinologist for adjustments asap.    nicotine (NICODERM CQ) 21 mg/24 hr Place 1 patch onto the skin once daily.    [DISCONTINUED] metoprolol succinate (TOPROL-XL) 25 MG 24 hr tablet Take 1 tablet (25 mg total) by mouth once daily.     Family History    None       Tobacco Use    Smoking status: Every Day     Packs/day: 2.00     Types: Cigarettes    Smokeless tobacco: Never    Tobacco comments:     1 pack of cigarettes will last the pt 2 days.   Substance and Sexual Activity    Alcohol use: Yes     Comment: two drinks every 3 days.    Drug use: Yes     Types: Marijuana     Comment: uses marijuasa daily     Sexual activity: Yes     Partners: Male     Review of Systems  12 point systems reviewed and negative except as mentioned in HPI section.    Objective:     Vital Signs (Most Recent):  Temp: 97.8 °F (36.6 °C) (06/15/23 0210)  Pulse: 90  (06/15/23 0210)  Resp: 19 (06/15/23 0210)  BP: 128/84 (06/15/23 0210)  SpO2: 98 % (06/15/23 0210) Vital Signs (24h Range):  Temp:  [97.4 °F (36.3 °C)-97.8 °F (36.6 °C)] 97.8 °F (36.6 °C)  Pulse:  [] 90  Resp:  [18-26] 19  SpO2:  [97 %-99 %] 98 %  BP: (115-144)/(67-85) 128/84     Weight: 48.7 kg (107 lb 5.8 oz)  Body mass index is 19.64 kg/m².     Physical Exam   General: Alert ,no apparent cardiorespiratory distress, laying comfortably  Eye: PERRL, normal conjunctiva  HEENT: Normocephalic, atraumatic, dry  oral mucosa  Neck: Supple.  Respiratory: Lungs CTA, equal breath sounds, symmetric expansion, no chest wall tenderness  Cardiovascular: Tachycardic, regular rhythm, no appreciable murmurs rubs or gallops, no peripheral edema, good pulses and equal in all extremities.  Gastrointestinal: Soft, non-tender, non-distended, normal bowel sounds.  Genitourinary: Deferred  Musculoskeletal and Neurologic: Motor function is normal with muscle strength 5/5 bilaterally to upper and lower extremities. Sensation is intact bilaterally. Reflexes 2+ bilaterally  Psychiatric: Appropriate mood and affect.          Significant Labs: All pertinent labs within the past 24 hours have been reviewed.  CBC:   Recent Labs   Lab 06/14/23 2118   WBC 5.69   HGB 14.6   HCT 46.5        CMP:   Recent Labs   Lab 06/14/23 2118      K 3.5   CL 97   CO2 29   *   BUN 18   CREATININE 0.7   CALCIUM 9.3   PROT 7.6   ALBUMIN 3.7   BILITOT 1.1*   ALKPHOS 328*   AST 39   ALT 46*   ANIONGAP 13       Significant Imaging: I have reviewed all pertinent imaging results/findings within the past 24 hours.  I have reviewed and interpreted all pertinent imaging results/findings within the past 24 hours.    Assessment/Plan:     Thyrotoxicosis with thyrotoxic crisis  Continue propranolol 20 mg 3 times daily  Methimazole 30 mg twice daily.  Continue Decadron 4 mg IV every 12 hours.  Recommend close outpatient endocrinology  follow-up.      Abnormal CT of the head   Apparently the lucencies were not present previously from prior CAT scan.  We will get heme-onc opinion.      Noncompliance by refusing intervention or support  PEC initiated in ER, awaiting psychiatry consult.      Syncope and collapse  Continue cardiac monitoring  Check Echocardiogram.        Primary hypertension  Continue propranolol.  Can switch to atenolol on discharge if need be.        VTE Risk Mitigation (From admission, onward)           Ordered     enoxaparin injection 40 mg  Daily         06/15/23 0246     IP VTE HIGH RISK PATIENT  Once         06/15/23 0246     Place sequential compression device  Until discontinued         06/15/23 0246                               Jayy Mac MD  Department of Hospital Medicine  [facility] - Telemetry

## 2023-06-15 NOTE — ASSESSMENT & PLAN NOTE
Continue propranolol 20 mg 3 times daily  Methimazole 30 mg twice daily.  Continue Decadron 4 mg IV every 12 hours.  Recommend close outpatient endocrinology follow-up.

## 2023-06-15 NOTE — PLAN OF CARE
Problem: Adult Inpatient Plan of Care  Goal: Plan of Care Review  Outcome: Ongoing, Progressing  Goal: Optimal Comfort and Wellbeing  Outcome: Ongoing, Progressing  Intervention: Monitor Pain and Promote Comfort  Flowsheets (Taken 6/15/2023 1801)  Pain Management Interventions:   care clustered   pillow support provided   position adjusted   quiet environment facilitated

## 2023-06-15 NOTE — ED PROVIDER NOTES
Encounter Date: 6/14/2023    SCRIBE #1 NOTE: I, Jo Ann Nunez, am scribing for, and in the presence of,  Vernon Shay MD. I have scribed the entire note.     History     Chief Complaint   Patient presents with    Loss of Consciousness     Pt arrived via ems, pt chief complaint is syncope. Pt states has been having syncopal episodes. Pt had a syncopal episode today, per ems pt hypotensive with a bp 75/44 now after fluid resuscitation was 117/70.     The patient is a 43 y.o. female with past medical history of HTN, hyperthyroidism who presents to the ED with a complaint of syncope. Patient states she was eating crawfish with her friends when she experienced loss of consciousness. Associated symptoms include weakness, upper back pain, posterior HA, leg pain, chest pain. Patient states she has not taken her thyroid medication since the last time she was at the hospital because she ran out. No other exacerbating or alleviating factors. Patient denies SOB, nausea, abdominal pain, or any other associated symptoms. Patient denies Hx of PE or DVT. Patient denies EtOH use, but endorses marijuana use. Patient presented to the ED on 06/08/2023 for the same symptoms and was diagnosed with thyrotoxicosis, but she left AMA. This was her second time leaving AMA.     The history is provided by the patient and medical records. The history is limited by the condition of the patient. No  was used.   Review of patient's allergies indicates:   Allergen Reactions    Ibuprofen Hives and Edema     Past Medical History:   Diagnosis Date    Migraines     Primary hypertension 03/04/2023    Thyroid disease      No past surgical history on file.  No family history on file.  Social History     Tobacco Use    Smoking status: Every Day     Packs/day: 2.00     Types: Cigarettes    Smokeless tobacco: Never    Tobacco comments:     1 pack of cigarettes will last the pt 2 days.   Substance Use Topics    Alcohol use: Yes      Comment: two drinks every 3 days.    Drug use: Yes     Types: Marijuana     Comment: uses marijuasa daily      Review of Systems   Constitutional:  Negative for chills and fever.   HENT:  Negative for congestion, postnasal drip, rhinorrhea and sore throat.    Eyes:  Negative for discharge.   Respiratory:  Negative for cough, chest tightness and shortness of breath.    Cardiovascular:  Positive for chest pain. Negative for leg swelling.   Gastrointestinal:  Positive for abdominal pain. Negative for constipation, diarrhea, nausea and vomiting.   Genitourinary:  Negative for difficulty urinating, dysuria, flank pain, frequency, hematuria and urgency.   Musculoskeletal:  Positive for back pain. Negative for arthralgias and joint swelling.   Skin:  Negative for rash.   Allergic/Immunologic: Negative for immunocompromised state.   Neurological:  Positive for syncope, weakness and headaches. Negative for light-headedness and numbness.   Psychiatric/Behavioral:  Negative for confusion.      Physical Exam     Initial Vitals [06/14/23 2036]   BP Pulse Resp Temp SpO2   117/70 (!) 122 18 97.4 °F (36.3 °C) 98 %      MAP       --         Physical Exam    Nursing note and vitals reviewed.  Constitutional: She appears well-developed. She appears ill. No distress.   Awake, but lethargic.  Screening body habitus.   HENT:   Head: Normocephalic.   Mouth/Throat: Oropharynx is clear and moist.   Tenderness to the back of the scalp with no blood or wound.    Eyes: Conjunctivae and EOM are normal. Pupils are equal, round, and reactive to light. Right eye exhibits no nystagmus. Left eye exhibits no nystagmus.   3 mm pupils.    Neck:   Anterior neck mass; possibly goiter.    Normal range of motion.  Cardiovascular:  Regular rhythm and normal heart sounds.   Tachycardia present.         No murmur heard.  Pulmonary/Chest: Breath sounds normal. No respiratory distress. She has no wheezes.   Abdominal: Abdomen is soft and flat. There is no  abdominal tenderness.   Musculoskeletal:         General: No edema.      Cervical back: Normal range of motion.      Comments: No lower extremity edema.      Neurological: She is disoriented. GCS score is 15.   Weakness to bilateral upper and lower extremities.   Slow to respond.  No dysarthria.  Oriented to situation only.     Skin: Skin is warm.   Psychiatric: She has a normal mood and affect.       ED Course   Critical Care    Date/Time: 6/14/2023 11:17 PM  Performed by: Vernon Shay MD  Authorized by: Vernon Shay MD   Direct patient critical care time: 7 minutes  Additional history critical care time: 7 minutes  Ordering / reviewing critical care time: 7 minutes  Documentation critical care time: 5 minutes  Consulting other physicians critical care time: 7 minutes  Consult with family critical care time: 5 minutes  Other critical care time: 5 minutes  Total critical care time (exclusive of procedural time) : 43 minutes  Critical care time was exclusive of teaching time and separately billable procedures and treating other patients.  Critical care was necessary to treat or prevent imminent or life-threatening deterioration of the following conditions: circulatory failure, metabolic crisis and CNS failure or compromise.  Critical care was time spent personally by me on the following activities: blood draw for specimens, development of treatment plan with patient or surrogate, discussions with consultants, interpretation of cardiac output measurements, evaluation of patient's response to treatment, examination of patient, obtaining history from patient or surrogate, ordering and performing treatments and interventions, ordering and review of laboratory studies, ordering and review of radiographic studies, pulse oximetry, vascular access procedures, review of old charts and re-evaluation of patient's condition.  Comments: Patient has a torn thyroid and needs IV medications      Labs Reviewed   CBC W/ AUTO  DIFFERENTIAL - Abnormal; Notable for the following components:       Result Value    RBC 6.44 (*)     MCV 72 (*)     MCH 22.7 (*)     MCHC 31.4 (*)     Gran % 32.6 (*)     Lymph % 50.8 (*)     All other components within normal limits   COMPREHENSIVE METABOLIC PANEL - Abnormal; Notable for the following components:    Glucose 122 (*)     Total Bilirubin 1.1 (*)     Alkaline Phosphatase 328 (*)     ALT 46 (*)     All other components within normal limits   TROPONIN I - Abnormal; Notable for the following components:    Troponin I 0.035 (*)     All other components within normal limits   T4, FREE - Abnormal; Notable for the following components:    Free T4 2.86 (*)     All other components within normal limits   TSH - Abnormal; Notable for the following components:    TSH <0.010 (*)     All other components within normal limits   D DIMER, QUANTITATIVE - Abnormal; Notable for the following components:    D-Dimer 0.74 (*)     All other components within normal limits   POCT GLUCOSE - Abnormal; Notable for the following components:    POCT Glucose 139 (*)     All other components within normal limits   ISTAT PROCEDURE - Abnormal; Notable for the following components:    POC PH 7.337 (*)     POC PCO2 63.7 (*)     POC PO2 21 (*)     POC HCO3 34.1 (*)     POC SATURATED O2 29 (*)     POC TCO2 36 (*)     All other components within normal limits   CULTURE, BLOOD   CULTURE, BLOOD   LIPASE   B-TYPE NATRIURETIC PEPTIDE   MAGNESIUM   ALCOHOL,MEDICAL (ETHANOL)   ALCOHOL,MEDICAL (ETHANOL)   HCG, QUANTITATIVE   HCG, QUANTITATIVE   URINALYSIS, REFLEX TO URINE CULTURE   LACTIC ACID, PLASMA   DRUG SCREEN PANEL, URINE EMERGENCY   ACETAMINOPHEN LEVEL   POCT URINE PREGNANCY   ISTAT LACTATE          Imaging Results              US Lower Extremity Veins Bilateral (Final result)  Result time 06/14/23 23:42:36      Final result by Kit Naqvi MD (06/14/23 23:42:36)                   Impression:      No evidence of lower extremity deep  venous thrombosis.      Electronically signed by: Kit Naqvi MD  Date:    06/14/2023  Time:    23:42               Narrative:    EXAMINATION:  US LOWER EXTREMITY VEINS BILATERAL    CLINICAL HISTORY:  Other specified abnormal findings of blood chemistry    TECHNIQUE:  Duplex and color flow Doppler evaluation of the bilateral lower extremity veins was performed.    COMPARISON:  None    FINDINGS:  No evidence of clot involving the bilateral common femoral, greater saphenous, femoral, popliteal, peroneal, anterior and posterior tibial veins.  All venous structures demonstrate normal respiratory phasicity and augment adequately.  No evidence of soft tissue mass or Baker's cyst.                                        CT Head Without Contrast (Edited Result - FINAL)  Result time 06/14/23 22:46:01      Addendum (preliminary) 1 of 1 by Kit Naqvi MD (06/14/23 22:46:01)      Comparison is made to prior head CT from May 2020.  There is heterogeneity seen of the calvarium with multifocal scattered lucencies visualized.  Potential osseous metastatic disease not excluded.  Correlate for any patient history of prior malignancy.  Future nonemergent follow-up/surveillance may be obtained.    **    This report was flagged in Epic as abnormal.  This report was flagged in Epic as containing an incidental finding.    **      Electronically signed by: Kit Naqvi MD  Date:    06/14/2023  Time:    22:46                 Final result by Kit Naqvi MD (06/14/23 22:29:04)                   Impression:      No acute intracranial abnormalities identified.      Electronically signed by: Kit Naqvi MD  Date:    06/14/2023  Time:    22:29               Narrative:    EXAMINATION:  CT HEAD WITHOUT CONTRAST    CLINICAL HISTORY:  Head trauma, moderate-severe;    TECHNIQUE:  Low dose axial images were obtained through the head.  Coronal and sagittal reformations were also performed. Contrast was not  administered.    COMPARISON:  None.    FINDINGS:  No evidence of acute/recent major vascular distribution cerebral infarction, intraparenchymal hemorrhage, or intra-axial space occupying lesion. The ventricular system is normal in size and configuration with no evidence of hydrocephalus. No effacement of the skull-base cisterns. No abnormal extra-axial fluid collections or blood products. Visualized paranasal sinuses and mastoid air cells are clear. The calvarium shows no significant abnormality.                                       CT Cervical Spine Without Contrast (Final result)  Result time 06/14/23 22:36:23      Final result by Kit Naqvi MD (06/14/23 22:36:23)                   Impression:      No evidence of acute cervical spine fracture or dislocation.    Few small multifocal nonspecific lucencies within the cervical vertebral bodies.      Electronically signed by: Kit Naqvi MD  Date:    06/14/2023  Time:    22:36               Narrative:    EXAMINATION:  CT CERVICAL SPINE WITHOUT CONTRAST    CLINICAL HISTORY:  fall headache, AMS;    TECHNIQUE:  Low dose axial images, sagittal and coronal reformations were performed though the cervical spine.  Contrast was not administered.    COMPARISON:  None    FINDINGS:  No evidence of acute cervical spine fracture or dislocation.  Odontoid process is intact.  Craniocervical junction is unremarkable.  There is straightening of the normal cervical lordosis.  Mild anterior degenerative spurring is visualized at the C5-6 level.  Few small multifocal nonspecific lucencies are seen within the vertebral bodies.    Thyroid is enlarged, noting similar findings on prior exams.  Surrounding soft tissues show no acute abnormalities.  Airway is patent.                                       X-Ray Chest AP Portable (Final result)  Result time 06/14/23 22:11:21      Final result by Kit Naqvi MD (06/14/23 22:11:21)                   Impression:      No acute  cardiopulmonary process identified.      Electronically signed by: Kit Naqvi MD  Date:    06/14/2023  Time:    22:11               Narrative:    EXAMINATION:  XR CHEST AP PORTABLE    CLINICAL HISTORY:  Sepsis;    TECHNIQUE:  Single frontal view of the chest was performed.    COMPARISON:  06/09/2023.    FINDINGS:  Cardiac silhouette is normal in size.  Lungs are symmetrically expanded.  No evidence of new focal consolidative process, pneumothorax, or significant pleural effusion.  No acute osseous abnormality identified.                                       Medications   propranoloL tablet 60 mg (has no administration in time range)   dexAMETHasone injection 4 mg (has no administration in time range)   sodium chloride 0.9% bolus 1,497 mL 1,497 mL (0 mLs Intravenous Stopped 6/14/23 2236)   cefTRIAXone (ROCEPHIN) 2 g in dextrose 5 % in water (D5W) 5 % 100 mL IVPB (MB+) (0 g Intravenous Stopped 6/14/23 2243)   propranoloL injection 1 mg (1 mg Intravenous Given 6/14/23 2256)   propylthiouraciL tablet 500 mg (500 mg Oral Given 6/14/23 2357)   propranoloL injection 1 mg (1 mg Intravenous Given 6/14/23 2354)   oxyCODONE-acetaminophen 5-325 mg per tablet 1 tablet (1 tablet Oral Given 6/15/23 0029)     Medical Decision Making:   History:   Old Medical Records: I decided to obtain old medical records.  Initial Assessment:     43-year-old female presents following syncopal episode and altered mental status.  Patient recently left against medical advice following diagnosed thyrotoxicosis.  After mental improvement later in the ER stay patient states that she did not fill any of her medications.  Suspect thyroid storm as patient presented altered with history of hyperthyroidism.  Patient's mentation did improve following IV fluids and medications.  Following improvement patient stated that she wanted to leave against medical advice despite concerns to her health.  Given that she is critically ill and this would be her 3rd  IZZY I had concerns regarding patient's ability to make decisions and being gravely disabled.  Patient was placed under pec.  Tele psychiatric consult placed and pending.    Of note patient also had abnormal calvarium lesions.  This information was relayed to the patient.  Hospital team also made aware.  Patient denies any history of cancer.  Differential Diagnosis:   Thyroid storm, thyrotoxicosis, ICH, fracture  Independently Interpreted Test(s):   I have ordered and independently interpreted EKG Reading(s) - see summary below  Clinical Tests:   Lab Tests: Ordered and Reviewed  Radiological Study: Ordered and Reviewed  Medical Tests: Ordered and Reviewed  ED Management:    Problems considered includes fall, syncope (Signs & symptoms, differentials)  Chronic problems impacting care includes hyperthyroidism.  Please see workup section for emergency physician independent ECG interpretation.  Imaging independently reviewed.  Agree with radiologist's interpretation. Imaging includes no infiltrate or cardiomegaly noted on chest x-ray  All labs reviewed and considered in the patient's differential diagnosis. Discussion of labs includes TSH negative with elevated T4.  Prior records were reviewed and considered in the differential diagnosis. This includes recent hospitalization leaving against medical advice  Additional history obtained from independent historians. Details includes history obtained from EMS as patient initially not aware of what had occurred.  (EMS, police, family, nursing home)    Decision regarding hospitalization.  The patient requires additional care in the hospital overnight.   Dr. Mac with hospital medicine will accept the patient. Labs, imaging, or procedures were discussed.      Consideration given to administration of hydrocortisone however on back order.   Plan was discussed with the patient.  This includes plan for admission, concerns for critical illness, abnormal CT head results.    All  questions or concerns have been addressed.         Scribe Attestation:   Scribe #1: I performed the above scribed service and the documentation accurately describes the services I performed. I attest to the accuracy of the note.      ED Course as of 06/15/23 0030   Wed Jun 14, 2023 2205 EKG 12-lead  Time 9:02 p.m.     Rate 116, sinus, regular rhythm, normal axis.   QRS 78 QTC of 486.  No ST elevation.  ST depression V3.  Diffuse T-wave inversions.  No hyperacute T-waves.  No Q-waves present     Sinus tachycardia with diffuse T-wave inversions and ST depression. [JM]   2206 EKG 12-lead  Time 8:49 p.m.     Rate 122, sinus, regular rhythm, normal axis.  LA 34, QRS 76 QTC of 467.  ST depression V3 V6.  No ST elevation.  Diffuse T-wave inversions.  No Q-waves.      Normal sinus rhythm with ST depressions and diffuse T-wave inversions.   [JM]   2258 BILIRUBIN TOTAL(!): 1.1 [JM]   2300 The mother states that the patient lost her home recently, sons placed in long term. Mom believes she is depressed.     Marisela Kirk, mom [phone]  Harris Caro, daughter.    [JM]   2304 The patient is slightly more awake at this time.  Patient asked to see me at bedside.  Patient states that she wants to be discharged despite being critically ill.  This is the 3rd time the patient has left AMA as soon as the patient wakes up.  Following discussion with the mother patient has been depressed at home.  I have concerns that the patient is gravely disabled and not making appropriate decisions at this time.  Patient will be placed on a pec until more competent.  Will consult psychiatry. [JM]   2345 Patient now significantly more awake.  Patient understands the severity of the situation.  Patient is agreeable to stay at this time.  I will continue pec for time being until cleared by Psychiatry. [JM]   2347 CT Head Without Contrast(!)  Abnormal calvarium lesions noted. [JM]   2347 HCG Quant: <1.2 [JM]   2349 Discussion with   "Estefania.     PO prop 60mg    50 hydrocortisone IV  Ok for floor.     Also discussed the abnormal finding of possible bone mets in the skull.    [JM]   u Mckay 15, 2023   0012 US Lower Extremity Veins Bilateral  Lower suspicion for PE at this time as patient has other cause of tachycardia.  Patient will likely need echocardiogram. 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 All medical record entries made by the scribe were at my direction and in my presence. I have reviewed the chart and agree that the record reflects my personal performance and is accurate and complete.      Vernon Shay MD  06/15/23 0030    "

## 2023-06-16 LAB
ANION GAP SERPL CALC-SCNC: 6 MMOL/L (ref 8–16)
BUN SERPL-MCNC: 16 MG/DL (ref 6–20)
CALCIUM SERPL-MCNC: 9.1 MG/DL (ref 8.7–10.5)
CHLORIDE SERPL-SCNC: 106 MMOL/L (ref 95–110)
CO2 SERPL-SCNC: 27 MMOL/L (ref 23–29)
CREAT SERPL-MCNC: 0.5 MG/DL (ref 0.5–1.4)
EST. GFR  (NO RACE VARIABLE): >60 ML/MIN/1.73 M^2
GLUCOSE SERPL-MCNC: 117 MG/DL (ref 70–110)
MAGNESIUM SERPL-MCNC: 1.5 MG/DL (ref 1.6–2.6)
POCT GLUCOSE: 103 MG/DL (ref 70–110)
POCT GLUCOSE: 104 MG/DL (ref 70–110)
POCT GLUCOSE: 123 MG/DL (ref 70–110)
POCT GLUCOSE: 97 MG/DL (ref 70–110)
POTASSIUM SERPL-SCNC: 4.4 MMOL/L (ref 3.5–5.1)
SODIUM SERPL-SCNC: 139 MMOL/L (ref 136–145)
T3 SERPL-MCNC: 129 NG/DL (ref 60–180)
T4 FREE SERPL-MCNC: 2.1 NG/DL (ref 0.71–1.51)
T4 SERPL-MCNC: 12.6 UG/DL (ref 4.5–11.5)
TSH SERPL DL<=0.005 MIU/L-ACNC: <0.01 UIU/ML (ref 0.4–4)

## 2023-06-16 PROCEDURE — 96361 HYDRATE IV INFUSION ADD-ON: CPT

## 2023-06-16 PROCEDURE — 84436 ASSAY OF TOTAL THYROXINE: CPT | Performed by: STUDENT IN AN ORGANIZED HEALTH CARE EDUCATION/TRAINING PROGRAM

## 2023-06-16 PROCEDURE — 36415 COLL VENOUS BLD VENIPUNCTURE: CPT | Performed by: STUDENT IN AN ORGANIZED HEALTH CARE EDUCATION/TRAINING PROGRAM

## 2023-06-16 PROCEDURE — 63600175 PHARM REV CODE 636 W HCPCS: Performed by: STUDENT IN AN ORGANIZED HEALTH CARE EDUCATION/TRAINING PROGRAM

## 2023-06-16 PROCEDURE — 99223 PR INITIAL HOSPITAL CARE,LEVL III: ICD-10-PCS | Mod: ,,, | Performed by: STUDENT IN AN ORGANIZED HEALTH CARE EDUCATION/TRAINING PROGRAM

## 2023-06-16 PROCEDURE — 84443 ASSAY THYROID STIM HORMONE: CPT | Performed by: STUDENT IN AN ORGANIZED HEALTH CARE EDUCATION/TRAINING PROGRAM

## 2023-06-16 PROCEDURE — 90833 PSYTX W PT W E/M 30 MIN: CPT | Mod: AF,HB,95, | Performed by: PSYCHIATRY & NEUROLOGY

## 2023-06-16 PROCEDURE — 84480 ASSAY TRIIODOTHYRONINE (T3): CPT | Performed by: STUDENT IN AN ORGANIZED HEALTH CARE EDUCATION/TRAINING PROGRAM

## 2023-06-16 PROCEDURE — 25000003 PHARM REV CODE 250: Performed by: INTERNAL MEDICINE

## 2023-06-16 PROCEDURE — 25000003 PHARM REV CODE 250: Performed by: STUDENT IN AN ORGANIZED HEALTH CARE EDUCATION/TRAINING PROGRAM

## 2023-06-16 PROCEDURE — 84439 ASSAY OF FREE THYROXINE: CPT | Performed by: INTERNAL MEDICINE

## 2023-06-16 PROCEDURE — 83735 ASSAY OF MAGNESIUM: CPT | Performed by: INTERNAL MEDICINE

## 2023-06-16 PROCEDURE — 99233 SBSQ HOSP IP/OBS HIGH 50: CPT | Mod: AF,HB,95, | Performed by: PSYCHIATRY & NEUROLOGY

## 2023-06-16 PROCEDURE — 99233 PR SUBSEQUENT HOSPITAL CARE,LEVL III: ICD-10-PCS | Mod: AF,HB,95, | Performed by: PSYCHIATRY & NEUROLOGY

## 2023-06-16 PROCEDURE — G0378 HOSPITAL OBSERVATION PER HR: HCPCS

## 2023-06-16 PROCEDURE — 21400001 HC TELEMETRY ROOM

## 2023-06-16 PROCEDURE — 96376 TX/PRO/DX INJ SAME DRUG ADON: CPT

## 2023-06-16 PROCEDURE — 63600175 PHARM REV CODE 636 W HCPCS: Performed by: INTERNAL MEDICINE

## 2023-06-16 PROCEDURE — 90833 PR PSYCHOTHERAPY W/PATIENT W/E&M, 30 MIN (ADD ON): ICD-10-PCS | Mod: AF,HB,95, | Performed by: PSYCHIATRY & NEUROLOGY

## 2023-06-16 PROCEDURE — 80048 BASIC METABOLIC PNL TOTAL CA: CPT | Performed by: INTERNAL MEDICINE

## 2023-06-16 PROCEDURE — 99223 1ST HOSP IP/OBS HIGH 75: CPT | Mod: ,,, | Performed by: STUDENT IN AN ORGANIZED HEALTH CARE EDUCATION/TRAINING PROGRAM

## 2023-06-16 PROCEDURE — 96374 THER/PROPH/DIAG INJ IV PUSH: CPT | Mod: 59

## 2023-06-16 RX ORDER — DEXAMETHASONE 4 MG/1
4 TABLET ORAL DAILY
Status: DISCONTINUED | OUTPATIENT
Start: 2023-06-17 | End: 2023-06-18 | Stop reason: HOSPADM

## 2023-06-16 RX ORDER — FLUCONAZOLE 150 MG/1
150 TABLET ORAL ONCE
Status: DISCONTINUED | OUTPATIENT
Start: 2023-06-16 | End: 2023-06-16

## 2023-06-16 RX ORDER — MICONAZOLE NITRATE 2 %
1 CREAM WITH APPLICATOR VAGINAL NIGHTLY
Status: DISCONTINUED | OUTPATIENT
Start: 2023-06-16 | End: 2023-06-18 | Stop reason: HOSPADM

## 2023-06-16 RX ORDER — ESCITALOPRAM OXALATE 5 MG/1
5 TABLET ORAL DAILY
Status: DISCONTINUED | OUTPATIENT
Start: 2023-06-16 | End: 2023-06-18 | Stop reason: HOSPADM

## 2023-06-16 RX ORDER — QUETIAPINE FUMARATE 100 MG/1
100 TABLET, FILM COATED ORAL NIGHTLY
Status: DISCONTINUED | OUTPATIENT
Start: 2023-06-16 | End: 2023-06-18 | Stop reason: HOSPADM

## 2023-06-16 RX ORDER — DEXAMETHASONE SODIUM PHOSPHATE 4 MG/ML
4 INJECTION, SOLUTION INTRA-ARTICULAR; INTRALESIONAL; INTRAMUSCULAR; INTRAVENOUS; SOFT TISSUE DAILY
Status: DISCONTINUED | OUTPATIENT
Start: 2023-06-17 | End: 2023-06-16

## 2023-06-16 RX ORDER — MAGNESIUM SULFATE HEPTAHYDRATE 40 MG/ML
2 INJECTION, SOLUTION INTRAVENOUS ONCE
Status: COMPLETED | OUTPATIENT
Start: 2023-06-16 | End: 2023-06-16

## 2023-06-16 RX ORDER — ATENOLOL 25 MG/1
25 TABLET ORAL 2 TIMES DAILY
Status: DISCONTINUED | OUTPATIENT
Start: 2023-06-16 | End: 2023-06-18 | Stop reason: HOSPADM

## 2023-06-16 RX ADMIN — HYDROCODONE BITARTRATE AND ACETAMINOPHEN 1 TABLET: 5; 325 TABLET ORAL at 09:06

## 2023-06-16 RX ADMIN — SODIUM CHLORIDE, POTASSIUM CHLORIDE, SODIUM LACTATE AND CALCIUM CHLORIDE: 600; 310; 30; 20 INJECTION, SOLUTION INTRAVENOUS at 02:06

## 2023-06-16 RX ADMIN — MAGNESIUM SULFATE HEPTAHYDRATE 2 G: 40 INJECTION, SOLUTION INTRAVENOUS at 02:06

## 2023-06-16 RX ADMIN — MICONAZOLE NITRATE 1 APPLICATOR: 20 CREAM VAGINAL at 09:06

## 2023-06-16 RX ADMIN — METHIMAZOLE 5 MG: 5 TABLET ORAL at 02:06

## 2023-06-16 RX ADMIN — METHIMAZOLE 5 MG: 5 TABLET ORAL at 09:06

## 2023-06-16 RX ADMIN — ATENOLOL 25 MG: 25 TABLET ORAL at 09:06

## 2023-06-16 RX ADMIN — LORAZEPAM 0.5 MG: 0.5 TABLET ORAL at 12:06

## 2023-06-16 RX ADMIN — QUETIAPINE FUMARATE 100 MG: 100 TABLET ORAL at 09:06

## 2023-06-16 RX ADMIN — HYDROCODONE BITARTRATE AND ACETAMINOPHEN 1 TABLET: 5; 325 TABLET ORAL at 05:06

## 2023-06-16 RX ADMIN — LORAZEPAM 0.5 MG: 0.5 TABLET ORAL at 05:06

## 2023-06-16 RX ADMIN — ESCITALOPRAM 5 MG: 5 TABLET, FILM COATED ORAL at 09:06

## 2023-06-16 RX ADMIN — HYDROCODONE BITARTRATE AND ACETAMINOPHEN 1 TABLET: 5; 325 TABLET ORAL at 11:06

## 2023-06-16 NOTE — ASSESSMENT & PLAN NOTE
Switch to atenolol. BP currently controlled.     

## 2023-06-16 NOTE — ASSESSMENT & PLAN NOTE
Thyroid labs improving on current tx.    Transition propanolol to atenolol.   Methimazole 5 mg tid.., will d/c with 10 mg daily  titrating decadron  Recommend close outpatient endocrinology follow-up.  Continue to monitor daily thyroid labs

## 2023-06-16 NOTE — NURSING
Bedside report given to night nurse TIM Lopez. Walking rounds completed. Visualized and assessed patient. NAD noted. Safety precautions maintained and call light within reach.    Chart check completed.

## 2023-06-16 NOTE — PLAN OF CARE
Problem: Adult Inpatient Plan of Care  Goal: Optimal Comfort and Wellbeing  Outcome: Ongoing, Progressing     Problem: Fall Injury Risk  Goal: Absence of Fall and Fall-Related Injury  Outcome: Ongoing, Progressing     Problem: Pain Acute  Goal: Acceptable Pain Control and Functional Ability  Outcome: Ongoing, Progressing

## 2023-06-16 NOTE — SUBJECTIVE & OBJECTIVE
Interval History: Pt stating she had experienced a lot of weight loss lately and requesting double portions on food. Pt understands need to get further evaluation. Plan for d/c to psych facility tomorrow back on home medications if continues to improve. Pt had issues with family members and staff yesterday, but is requesting to use the phone to call them. I will discuss with management.     Review of Systems  Objective:     Vital Signs (Most Recent):  Temp: 98.1 °F (36.7 °C) (06/16/23 1141)  Pulse: 84 (06/16/23 1141)  Resp: 20 (06/16/23 1141)  BP: 120/60 (06/16/23 1141)  SpO2: 98 % (06/16/23 1141) Vital Signs (24h Range):  Temp:  [97.4 °F (36.3 °C)-98.3 °F (36.8 °C)] 98.1 °F (36.7 °C)  Pulse:  [55-96] 84  Resp:  [16-20] 20  SpO2:  [95 %-99 %] 98 %  BP: (109-146)/(57-90) 120/60     Weight: 48.5 kg (107 lb)  Body mass index is 19.57 kg/m².    Intake/Output Summary (Last 24 hours) at 6/16/2023 1326  Last data filed at 6/15/2023 1727  Gross per 24 hour   Intake 240 ml   Output --   Net 240 ml         Physical Exam  Vitals reviewed.   Constitutional:       General: She is not in acute distress.     Appearance: She is ill-appearing (chronic).   HENT:      Mouth/Throat:      Mouth: Mucous membranes are moist.      Pharynx: Oropharynx is clear.   Eyes:      General: No scleral icterus.     Extraocular Movements: Extraocular movements intact.   Cardiovascular:      Rate and Rhythm: Normal rate and regular rhythm.   Skin:     General: Skin is warm and dry.   Neurological:      General: No focal deficit present.      Mental Status: She is alert and oriented to person, place, and time.   Psychiatric:         Mood and Affect: Mood normal.         Behavior: Behavior normal.           Significant Labs: All pertinent labs within the past 24 hours have been reviewed.    Significant Imaging: I have reviewed all pertinent imaging results/findings within the past 24 hours.

## 2023-06-16 NOTE — CONSULTS
[facility]  Psychiatry  Telepsychiatry Consult Progress Note          Please see previous notes: see Dr. Trevino's consult note from 06/15/2023    Patient agreeable to consultation via telepsychiatry.    Tele-Consultation from Psychiatry started: 6/16/2023 at 3:56 PM  The chief complaint leading to psychiatric consultation is: Re-Evaluation of Need for PEC   This consultation was requested by Chase Tovar III, MD, the primary team attending physician.  The location of the consulting psychiatrist is [place], [place].  The patient location is  [facility]   Also present with the patient at the time of the consultation: nurse / tech     Patient Identification:   Morgan Caro is a 43 y.o. female.    Patient information was obtained from patient, past medical records, and primary team.    Inpatient consult to Telemedicine - Psyc  Consult performed by: Nolan Knapp MD  Consult ordered by: Chase Tovar III, MD      Consult Start Time: 06/16/2023 15:56 CDT  Consult End Time: 06/16/2023 17:10 CDT    HISTORY    Per Initial History from Primary Team:   Patient presents with    Loss of Consciousness       Pt arrived via ems, pt chief complaint is syncope. Pt states has been having syncopal episodes. Pt had a syncopal episode today, per ems pt hypotensive with a bp 75/44 now after fluid resuscitation was 117/70.   Ms Caro is a 43-year-old -American female who presents to the emergency room, after suffering a reported syncopal event.  Unclear if she had any trauma but does complain of back pain, headaches, leg and chest wall pain.  She was recently admitted to the hospital on 6/9/2023 during which time she was admitted for thyrotoxicosis.  Reported medical history significant for hypothyroidism, hypertension, back grains and had a similar presentation back in March 2023 when she was admitted for thyrotoxicosis and was admitted in the ICU at the time.  Before that March hospitalization she had  admission back in [facility] for 2 days and left AGAINST MEDICAL ADVICE from there.  After she was stabilized, she again left AGAINST MEDICAL ADVICE on 6/12/2023.  In the ER, she was noted to be initially tachycardic with 122 bpm, with blood pressure 117/70 her CBC appears grossly unremarkable, her BMP shows normal renal function, has elevated alk phos that has been there previously, minimally elevated troponin of 0.035 from demand ischemia, denies any chest pains, her TSH is less than 0.01 with elevated free T4 of 2.86.  Her x-ray of the thoracic spine is negative, lumbar spine is negative, DVT ultrasound negative for VTE, her CT head reveals heterogeneity in the calvarium that is multifocal and scattered with possibility of osseous metastatic disease, CT C-spine is negative for acute fractures.  She received IV propranolol followed by p.o.,  mg in the ER along with IV Decadron with control of her symptoms.  She is being admitted for further evaluation and treatment.  Interval History from Primary Team on 06/16/2023:   Pt stating she had experienced a lot of weight loss lately and requesting double portions on food. Pt understands need to get further evaluation. Plan for d/c to psych facility tomorrow back on home medications if continues to improve. Pt had issues with family members and staff yesterday, but is requesting to use the phone to call them. I will discuss with management.       Chief Complaint / Reason for Psychiatry Consult: Re-Evaluation of Need for PEC       Subjective / Interval Psychiatric History Today (06/16/2023) (with Psychiatric ROS below):  Morgan Caro is a 43 y.o. female with a past medical history as noted above/below and a past psychiatric history of depression / mood disorder and anxiety, currently being treated by her inpatient primary team for a principle problem of thyrotoxicosis with thyrotoxic crisis.  Psychiatry was originally consulted as noted above.  The patient was seen and  "examined.  The chart was reviewed.  On examination today, the patient was alert and oriented to person, place, city, state, month, year, and situation.  She was CAM-ICU negative for delirium.  She did have some behavioral disturbances yesterday that appeared more in the context of personality traits rather than mood disorder.  She endorses feeling "much better" today.  While still talkative, her speech does not appear rapid / pressured today.  She endorses some intermittent irritability / mood / anxiety symptoms as noted below in the detailed psych ROS, but she states that her symptoms have significantly improved / stabilized with her thyroid treatment in conjunction with her Seroquel.  She is requesting to increased her Seroquel to 100 mg PO QHS due to success with this regimen previously.  She endorses a good / improving appetite (requesting double portions).  She endorses sleeping 6-7 hours overnight.  She denies any AH, VH, TH, delusions, paranoia, or DIGFAST (ana rosa) s/s (no RIS observed).  She denies any current or recent active or passive SI / HI.  She denies any adverse effects to their current medication regimen.  She endorses an understanding of her current medical status / condition(s) and endorses a desire to continue with the appropriate medications to treat these conditions.  Psychotherapy was implemented as noted below with a focus on improving mood, anxiety, sleep, and behavioral modification.  See detailed psych ROS below.  See A/P below.      Collateral:  I attempted but was unable to reach the patient's daughter (GREG DUQUE) at [phone].      Psychiatric Review Of Systems - Currently, the patient is endorsing and/or denying the following:  (patient's endorsements are BOLDED below; if not BOLDED, then patient denied):    Endorses intermittent Symptoms of Depression (improving on Seroquel): diminished mood, low motivation, loss of interest/anhedonia, irritability, diminished energy, change " in sleep, change in appetite, diminished concentration or cognition or indecisiveness, PMA/R, excessive guilt or hopelessness or worthlessness, suicidal ideations    Endorses intermittent issues with Sleep (improving on Seroquel): initiation, maintenance, early morning awakening with inability to return to sleep    Denies Suicidal/Homicidal ideations: active/passive ideations, organized plans, future intentions    Denies Symptoms of psychosis: hallucinations, delusions, disorganized thinking, disorganized behavior or abnormal motor behavior, or negative symptoms (diminshed emotional expression, avolition, anhedonia, alogia, asociality)     Denies Symptoms of ana rosa or hypomania: elevated, expansive, or irritable mood with increased energy or activity; with inflated self-esteem or grandiosity, decreased need for sleep, increased rate of speech, FOI or racing thoughts, distractibility, increased goal directed activity or PMA, risky/disinhibited behavior    Endorses intermittent Symptoms of Anxiety: excessive anxiety/worry/fear, more days than not, about numerous issues, difficult to control, with restlessness, fatigue, poor concentration, irritability, muscle tension, sleep disturbance; causes functionally impairing distress     Denies Symptoms of Panic Disorder: recurrent panic attacks, precipitated or un-precipitated, source of worry and/or behavioral changes secondary; with or without agoraphobia    Denies Symptoms of PTSD: h/o trauma; re-experiencing/intrusive symptoms, avoidant behavior, negative alterations in cognition or mood, or hyperarousal symptoms; with or without dissociative symptoms     Denies Symptoms of OCD: obsessions or compulsions     Denies Symptoms of Eating Disorders: anorexia, bulimia or binging    Denies Substance Use: intoxication, withdrawal, tolerance, used in larger amounts or duration than intended, unsuccessful attempts to limit or quit, increased time engaging in or seeking out, cravings  or strong desire to use, failure to fulfill obligations, negative consequences in social/interpersonal/occupational,/recreational areas, use in dangerous situations, medical or psychological consequences       PSYCHOTHERAPY ADD-ON [phone]   30 (16-37*) minutes    Time: 20 minutes  Participants: Met with patient    Therapeutic Intervention Type: behavior modifying psychotherapy, supportive psychotherapy  Why chosen therapy is appropriate versus another modality: relevant to diagnosis, patient responds to this modality, evidence based practice    Target symptoms: mood, anxiety, and insomnia   Primary focus: improving mood, anxiety, sleep, and behavioral modification  Psychotherapeutic techniques: supportive and psychodynamic techniques; psycho-education; deep breathing exercises; behavioral modification; CBT; problem solving techniques and managing life / medical stressors    Outcome monitoring methods: self-report, observation    Patient's response to intervention:  The patient's response to intervention is accepting / improving     Progress toward goals:  The patient's progress toward goals is fair / improving       ROS:  General ROS: negative for - chills, fatigue, fever or night sweats  Ophthalmic ROS: negative for - blurry vision, double vision or eye pain  ENT ROS: negative for - sinus pain, headaches, sore throat or visual changes  Allergy and Immunology ROS: negative for - hives, itchy/watery eyes or nasal congestion  Hematological and Lymphatic ROS: negative for - bleeding problems, bruising, jaundice or pallor  Endocrine ROS: negative for - galactorrhea, hot flashes, mood swings, palpitations or temperature intolerance  Respiratory ROS: negative for - cough, hemoptysis, shortness of breath, tachypnea or wheezing  Cardiovascular ROS: negative for - chest pain, dyspnea on exertion, loss of consciousness, palpitations, rapid heart rate or shortness of breath  Gastrointestinal ROS: negative for - appetite loss,  nausea, abdominal pain, blood in stools, change in bowel habits, constipation or diarrhea  Genito-Urinary ROS: negative for - incontinence, nocturia or pelvic pain  Musculoskeletal ROS: negative for - joint stiffness, joint swelling, joint pain or muscle pain   Neurological ROS: negative for - behavioral changes, confusion, dizziness, memory loss, numbness/tingling or seizures  Dermatological ROS: negative for dry skin, hair changes, pruritus or rash  Psychiatric ROS: see detailed psychiatric ROS above in history section       Psychiatric History:   Previous Psychiatric Hospitalizations: No   Previous Medication Trials: Yes ativan, quetiapine   Previous Suicide Attempts: no   History of Violence: no  History of Depression: no  History of [place]: no  History of Auditory/Visual Hallucination no  History of Delusions: no  Outpatient psychiatrist (current & past): No     Substance Abuse History:  Tobacco:1 ppd  Alcohol: No  Illicit Substances:cannabis  Detox/Rehab: No     Legal History: Past charges/incarcerations: No      Family Psychiatric History: mother with schizophrenia      Social History:  Developmental/Childhood:Achieved all developmental milestones timely  Education: 12th grade  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    FAMILY HISTORY   No family history on file.    ALLERGIES   Review of patient's allergies indicates:   Allergen Reactions    Ibuprofen Hives and Edema       CURRENT MEDICATION REGIMEN   Home Meds:   Prior to Admission medications    Medication Sig Start Date End Date  Taking? Authorizing Provider   atenoloL (TENORMIN) 25 MG tablet Take 1 tablet (25 mg total) by mouth 2 (two) times a day. 3/8/23   Jaxon Alberts MD   EScitalopram oxalate (LEXAPRO) 5 MG Tab Take 1 tablet (5 mg total) by mouth once daily. 3/9/23 3/8/24  Jaxon Alberts MD   methIMAzole (TAPAZOLE) 10 MG Tab Take 1 tablet by mouth twice daily for 1 week, then reduce to 1 tablet by mouth once daily. Go to Endocrinologist for adjustments asap. 3/8/23 3/7/24  Jaxon Alberts MD   nicotine (NICODERM CQ) 21 mg/24 hr Place 1 patch onto the skin once daily. 3/8/23   Jaxon Alberts MD   metoprolol succinate (TOPROL-XL) 25 MG 24 hr tablet Take 1 tablet (25 mg total) by mouth once daily. 5/26/20 8/13/21  Jennifer Gonzalez MD       OTC Meds: denies     Scheduled Meds:    atenoloL  25 mg Oral BID    [START ON 6/17/2023] dexAMETHasone  4 mg Oral Daily    enoxparin  40 mg Subcutaneous Daily    EScitalopram oxalate  5 mg Oral Daily    magnesium sulfate IVPB  2 g Intravenous Once    methIMAzole  5 mg Oral TID    miconazole  1 applicator Vaginal QHS    QUEtiapine  100 mg Oral QHS      PRN Meds: acetaminophen, acetaminophen, aluminum-magnesium hydroxide-simethicone, dextrose 50%, dextrose 50%, glucagon (human recombinant), glucose, glucose, HYDROcodone-acetaminophen, loperamide, LORazepam, naloxone, OLANZapine, ondansetron, QUEtiapine, sodium chloride 0.9%   Psychotherapeutics (From admission, onward)      Start     Stop Route Frequency Ordered    06/16/23 2100  QUEtiapine tablet 100 mg         -- Oral Nightly 06/16/23 1644    06/16/23 1015  EScitalopram oxalate tablet 5 mg         -- Oral Daily 06/16/23 0901    06/15/23 2323  OLANZapine injection 5 mg         11/11 1423 IM Once as needed 06/15/23 2223    06/15/23 1523  LORazepam tablet 0.5 mg         -- Oral Every 6 hours PRN 06/15/23 1423    06/15/23 0431  QUEtiapine tablet 25 mg         -- Oral 2 times daily PRN 06/15/23 0331            LABORATORY DATA   Recent Results (from the past 72  hour(s))   POCT glucose    Collection Time: 06/14/23  9:00 PM   Result Value Ref Range    POCT Glucose 139 (H) 70 - 110 mg/dL   CBC auto differential    Collection Time: 06/14/23  9:18 PM   Result Value Ref Range    WBC 5.69 3.90 - 12.70 K/uL    RBC 6.44 (H) 4.00 - 5.40 M/uL    Hemoglobin 14.6 12.0 - 16.0 g/dL    Hematocrit 46.5 37.0 - 48.5 %    MCV 72 (L) 82 - 98 fL    MCH 22.7 (L) 27.0 - 31.0 pg    MCHC 31.4 (L) 32.0 - 36.0 g/dL    RDW 13.2 11.5 - 14.5 %    Platelets 176 150 - 450 K/uL    MPV 11.9 9.2 - 12.9 fL    Immature Granulocytes 0.5 0.0 - 0.5 %    Gran # (ANC) 1.9 1.8 - 7.7 K/uL    Immature Grans (Abs) 0.03 0.00 - 0.04 K/uL    Lymph # 2.9 1.0 - 4.8 K/uL    Mono # 0.8 0.3 - 1.0 K/uL    Eos # 0.1 0.0 - 0.5 K/uL    Baso # 0.04 0.00 - 0.20 K/uL    nRBC 0 0 /100 WBC    Gran % 32.6 (L) 38.0 - 73.0 %    Lymph % 50.8 (H) 18.0 - 48.0 %    Mono % 13.5 4.0 - 15.0 %    Eosinophil % 1.9 0.0 - 8.0 %    Basophil % 0.7 0.0 - 1.9 %    Differential Method Automated    Comprehensive metabolic panel    Collection Time: 06/14/23  9:18 PM   Result Value Ref Range    Sodium 139 136 - 145 mmol/L    Potassium 3.5 3.5 - 5.1 mmol/L    Chloride 97 95 - 110 mmol/L    CO2 29 23 - 29 mmol/L    Glucose 122 (H) 70 - 110 mg/dL    BUN 18 6 - 20 mg/dL    Creatinine 0.7 0.5 - 1.4 mg/dL    Calcium 9.3 8.7 - 10.5 mg/dL    Total Protein 7.6 6.0 - 8.4 g/dL    Albumin 3.7 3.5 - 5.2 g/dL    Total Bilirubin 1.1 (H) 0.1 - 1.0 mg/dL    Alkaline Phosphatase 328 (H) 55 - 135 U/L    AST 39 10 - 40 U/L    ALT 46 (H) 10 - 44 U/L    Anion Gap 13 8 - 16 mmol/L    eGFR >60 >60 mL/min/1.73 m^2   Troponin I    Collection Time: 06/14/23  9:18 PM   Result Value Ref Range    Troponin I 0.035 (H) 0.000 - 0.026 ng/mL   Lipase    Collection Time: 06/14/23  9:18 PM   Result Value Ref Range    Lipase 32 4 - 60 U/L   Brain natriuretic peptide    Collection Time: 06/14/23  9:18 PM   Result Value Ref Range    BNP 16 0 - 99 pg/mL   Magnesium    Collection Time: 06/14/23   9:18 PM   Result Value Ref Range    Magnesium 1.7 1.6 - 2.6 mg/dL   T4, free    Collection Time: 06/14/23  9:18 PM   Result Value Ref Range    Free T4 2.86 (H) 0.71 - 1.51 ng/dL   TSH    Collection Time: 06/14/23  9:18 PM   Result Value Ref Range    TSH <0.010 (L) 0.400 - 4.000 uIU/mL   Ethanol    Collection Time: 06/14/23  9:18 PM   Result Value Ref Range    Alcohol, Serum <10 <10 mg/dL   HCG, Quantitative    Collection Time: 06/14/23  9:18 PM   Result Value Ref Range    HCG Quant <1.2 See Text mIU/mL   Blood culture x two cultures. Draw prior to antibiotics.    Collection Time: 06/14/23  9:19 PM    Specimen: Peripheral, Hand, Right; Blood   Result Value Ref Range    Blood Culture, Routine No Growth to date     Blood Culture, Routine No Growth to date    D dimer, quantitative    Collection Time: 06/14/23  9:22 PM   Result Value Ref Range    D-Dimer 0.74 (H) <0.50 mg/L FEU   ISTAT PROCEDURE    Collection Time: 06/14/23  9:22 PM   Result Value Ref Range    POC PH 7.337 (L) 7.35 - 7.45    POC PCO2 63.7 (H) 35 - 45 mmHg    POC PO2 21 (L) 40 - 60 mmHg    POC HCO3 34.1 (H) 24 - 28 mmol/L    POC BE 6 -2 to 2 mmol/L    POC SATURATED O2 29 (L) 95 - 100 %    POC TCO2 36 (H) 24 - 29 mmol/L    Sample VENOUS     Site Other     Allens Test N/A     DelSys Room Air    ISTAT Lactate    Collection Time: 06/14/23  9:22 PM   Result Value Ref Range    POC Lactate 1.95 0.5 - 2.2 mmol/L    Sample VENOUS     Site Other     Allens Test N/A     DelSys Room Air    Blood culture x two cultures. Draw prior to antibiotics.    Collection Time: 06/14/23  9:52 PM    Specimen: Wrist, Right; Blood   Result Value Ref Range    Blood Culture, Routine No Growth to date     Blood Culture, Routine No Growth to date    Lactic acid, plasma #2    Collection Time: 06/15/23 12:43 AM   Result Value Ref Range    Lactate (Lactic Acid) 2.1 0.5 - 2.2 mmol/L   Acetaminophen level    Collection Time: 06/15/23 12:43 AM   Result Value Ref Range    Acetaminophen  (Tylenol), Serum <3.0 (L) 10.0 - 20.0 ug/mL   Basic Metabolic Panel (BMP)    Collection Time: 06/15/23  7:12 AM   Result Value Ref Range    Sodium 137 136 - 145 mmol/L    Potassium 4.5 3.5 - 5.1 mmol/L    Chloride 101 95 - 110 mmol/L    CO2 29 23 - 29 mmol/L    Glucose 205 (H) 70 - 110 mg/dL    BUN 23 (H) 6 - 20 mg/dL    Creatinine 0.6 0.5 - 1.4 mg/dL    Calcium 9.3 8.7 - 10.5 mg/dL    Anion Gap 7 (L) 8 - 16 mmol/L    eGFR >60 >60 mL/min/1.73 m^2   Magnesium    Collection Time: 06/15/23  7:12 AM   Result Value Ref Range    Magnesium 1.6 1.6 - 2.6 mg/dL   POCT glucose    Collection Time: 06/15/23  8:20 AM   Result Value Ref Range    POCT Glucose 162 (H) 70 - 110 mg/dL   Echo    Collection Time: 06/15/23  9:31 AM   Result Value Ref Range    BSA 1.46 m2    TDI SEPTAL 0.06 m/s    LV LATERAL E/E' RATIO 4.55 m/s    LV SEPTAL E/E' RATIO 8.33 m/s    LA WIDTH 4.70 cm    IVC diameter 1.69 cm    Left Ventricular Outflow Tract Mean Velocity 0.80 cm/s    Left Ventricular Outflow Tract Mean Gradient 2.85 mmHg    TDI LATERAL 0.11 m/s    PV PEAK VELOCITY 1.47 cm/s    LVIDd 4.61 3.5 - 6.0 cm    IVS 0.84 0.6 - 1.1 cm    Posterior Wall 0.76 0.6 - 1.1 cm    LVIDs 2.91 2.1 - 4.0 cm    FS 37 28 - 44 %    LA volume 46.14 cm3    Sinus 3.34 cm    STJ 2.81 cm    Ascending aorta 3.17 cm    LV mass 118.34 g    LA size 2.42 cm    RVDD 4.91 cm    TAPSE 2.45 cm    RV S' 10.59 cm/s    Left Ventricle Relative Wall Thickness 0.33 cm    AV regurgitation pressure 1/2 time [id number] ms    AV mean gradient 4 mmHg    AV valve area 3.08 cm2    AV Velocity Ratio 0.90     AV index (prosthetic) 0.86     MV mean gradient 1 mmHg    MV valve area p 1/2 method 4.16 cm2    MV valve area by continuity eq 5.05 cm2    E/A ratio 0.79     Mean e' 0.09 m/s    E wave deceleration time 182.53 msec    Pulm vein S/D ratio 1.26     LVOT diameter 2.13 cm    LVOT area 3.6 cm2    LVOT peak dk 1.19 m/s    LVOT peak VTI 21.00 cm    Ao peak dk 1.32 m/s    Ao VTI 24.3  cm    LVOT stroke volume 74.79 cm3    AV peak gradient 7 mmHg    MV peak gradient 2 mmHg    E/E' ratio 5.88 m/s    MV Peak E Marc 0.50 m/s    AR Max Marc 1.92 m/s    TR Max Marc 2.24 m/s    MV VTI 14.8 cm    MV stenosis pressure 1/2 time 52.93 ms    MV Peak A Marc 0.63 m/s    PV Peak S Marc 0.53 m/s    PV Peak D Marc 0.42 m/s    LV Systolic Volume 32.49 mL    LV Systolic Volume Index 22.1 mL/m2    LV Diastolic Volume 97.88 mL    LV Diastolic Volume Index 66.59 mL/m2    LA Volume Index 31.4 mL/m2    LV Mass Index 81 g/m2    RA Major Axis 4.21 cm    Left Atrium Minor Axis 4.88 cm    Left Atrium Major Axis 4.67 cm    Triscuspid Valve Regurgitation Peak Gradient 20 mmHg    RA Width 4.00 cm    Right Atrial Pressure (from IVC) 8 mmHg    EF 60 %    TV rest pulmonary artery pressure 28 mmHg   Urinalysis, Reflex to Urine Culture Urine, Clean Catch    Collection Time: 06/15/23 10:48 AM    Specimen: Urine   Result Value Ref Range    Specimen UA Urine, Clean Catch     Color, UA Yellow Yellow, Straw, Aide    Appearance, UA Clear Clear    pH, UA 7.0 5.0 - 8.0    Specific Gravity, UA 1.010 1.005 - 1.030    Protein, UA Negative Negative    Glucose, UA Trace (A) Negative    Ketones, UA Negative Negative    Bilirubin (UA) Negative Negative    Occult Blood UA Negative Negative    Nitrite, UA Negative Negative    Urobilinogen, UA Negative <2.0 EU/dL    Leukocytes, UA Negative Negative   POCT glucose    Collection Time: 06/15/23 12:08 PM   Result Value Ref Range    POCT Glucose 308 (H) 70 - 110 mg/dL   Basic Metabolic Panel (BMP)    Collection Time: 06/16/23  5:02 AM   Result Value Ref Range    Sodium 139 136 - 145 mmol/L    Potassium 4.4 3.5 - 5.1 mmol/L    Chloride 106 95 - 110 mmol/L    CO2 27 23 - 29 mmol/L    Glucose 117 (H) 70 - 110 mg/dL    BUN 16 6 - 20 mg/dL    Creatinine 0.5 0.5 - 1.4 mg/dL    Calcium 9.1 8.7 - 10.5 mg/dL    Anion Gap 6 (L) 8 - 16 mmol/L    eGFR >60 >60 mL/min/1.73 m^2   Magnesium    Collection Time: 06/16/23  5:02  "AM   Result Value Ref Range    Magnesium 1.5 (L) 1.6 - 2.6 mg/dL   T3    Collection Time: 06/16/23  5:02 AM   Result Value Ref Range    T3, Total 129 60 - 180 ng/dL   TSH    Collection Time: 06/16/23  5:02 AM   Result Value Ref Range    TSH <0.010 (L) 0.400 - 4.000 uIU/mL   T4    Collection Time: 06/16/23  5:02 AM   Result Value Ref Range    T4, Total 12.6 (H) 4.5 - 11.5 ug/dL   T4, Free    Collection Time: 06/16/23  5:02 AM   Result Value Ref Range    Free T4 2.10 (H) 0.71 - 1.51 ng/dL   POCT glucose    Collection Time: 06/16/23  7:57 AM   Result Value Ref Range    POCT Glucose 103 70 - 110 mg/dL   POCT glucose    Collection Time: 06/16/23 11:39 AM   Result Value Ref Range    POCT Glucose 104 70 - 110 mg/dL      No results found for: PHENYTOIN, PHENOBARB, VALPROATE, CBMZ      EXAMINATION    VITALS   Vitals:    06/16/23 0824 06/16/23 0827 06/16/23 0910 06/16/23 1141   BP: (!) 109/57   120/60   BP Location: Right arm   Left arm   Patient Position: Lying   Sitting   Pulse: (!) 57 80  84   Resp: 18  18 20   Temp: 98.2 °F (36.8 °C)   98.1 °F (36.7 °C)   TempSrc: Oral   Oral   SpO2: 95%   98%   Weight:       Height:            CONSTITUTIONAL  General Appearance: NAD, unremarkable, age appropriate, thin, seated in bed, calm     MUSCULOSKELETAL  Muscle Strength and Tone: WNL    Abnormal Involuntary Movements: none observed   Gait and Station: WNL; non-ataxic     PSYCHIATRIC   Behavior/Cooperation:  cooperative, eye contact normal, calm  Speech:  normal tone, increased rate / talkative, normal pitch, normal volume, easily redirectable   Language: grossly intact, able to name, able to repeat with spontaneous speech  Mood: "better"  Affect:  congruent with mood and full / reactive ; mildly irritable   Associations: intact; no ALICIA  Thought Process: linear, more logical, and future-oriented   Thought Content: denies SI, HI, AH, VH, TH, delusions, or paranoia (no RIS observed)  Sensorium:  Awake  Alert and Oriented: to person, " place, city, state, month, year, and situation   Memory: 3/3 immediate, 2/3 at 5 minutes    Recent: Intact; able to report recent events   Remote: Intact; Named 3/4 past presidents   Attention/concentration: Intact. Appropriate for age/education. Able to spell [id number] & [id number].   Similarities: Intact (difference between apple and orange?)  Abstract reasoning: Intact  Fund of Knowledge: Named 3/4 past presidents  Insight: Limited / Intact  Judgment: Limited / Intact    CAM ICU Delirium Assessment - NEGATIVE    Is the patient aware of the biomedical complications associated with substance abuse and mental illness? 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     - Increase Seroquel to 100 mg PO QHS for mood / anxiety / sleep (discussed risks/benefits/alt vs no treatment with patient).     - Psychotherapy was performed with patient as noted above with a focus on improving mood, anxiety, sleep, and behavioral modification.    - Patient's most recent resulted labs, imaging, and EKG were reviewed today.       - Please have Hospital CM/SW assist patient with ASAP outpatient mental health f/u for s/p discharge from this facility.    - Continue suicide / violence precautions and continue to monitor the patient with a sitter while on a PEC / CEC.       - Patient was instructed to call 911 and/or [phone] and return to the nearest ED if she begins feeling suicidal,  homicidal, or gravely disabled (for s/p this hospitalization).     - Thank you for this consult        Total time spent with patient face-to-face and/or managing/coordinating patient's care today (excluding the time spent on psychotherapy): 54 minutes   Time spent on psychotherapy today (as noted above): 20 minutes   Total time for encounter today including psychotherapy: 74 minutes      More than 50% of the time was spent counseling/coordinating care.     Consulting clinician was informed of the encounter and consult note.     Consultation ended: 6/16/2023 at 5:10 PM       STAFF:  Nolan Knapp MD  [facility]   6/16/2023

## 2023-06-16 NOTE — NURSING
2100 - pt given HS medications. She looked at her pills and commented that the day shift nurse did not give her the right pills. Pt encouraged to take the medications then pt questioned the dose of her quetiapine. Pt stated that she wanted to take 50mg of quetiapine at bedtime so she only needed one pill. I pointed out that each pill was 25mg so she needed two pills to get 50mg. I showed her the pill packets so she could see. Pt argued back that she only wanted one 50mg pill. Informed pt that we don't have any 50mg tabs and so she would have to take two 25mg tabs. Pt began arguing about different things. She stated that she doesn't want to take her medications at this time because she wants me to call her fiance and have him bring her some food and if she takes the medications now she will sleep through the food. Informed pt that while she is on a CEC she is not allowed outside food. Pt then put the pills back in the pill cup and stated that she was not going to take the medications and that she is going to leave so she can get some food. Offered pt snacks available to her and she refused. Pt wanted to continue to argue but I left the room.     2200- Pt c/o swelling and pain in left hand. IV infiltrated. IV fluids stopped, IV removed. Warm compress given to pt. Unable to give IV medications. Rapid response nurse in to attempt new IV but pt swatted needle out of her hand and refused to cooperate. Will attempt IV when pt is calm.    2300 - pt now agreeable to taking medications. She attempted to argue with me about how many pills she was supposed to take but the tech reminded her that she is trying to go home so she needs to take the medications that are offered to her. Pt then took medications without incident.

## 2023-06-16 NOTE — NURSING
[facility]  Nurses Note -- 4 Eyes      6/15/2023       Skin assessed on: Q Shift      [x] No Pressure Injuries Present    [x]Prevention Measures Documented    [] Yes LDA  for Pressure Injury Previously documented     [] Yes New Pressure Injury Discovered   [] LDA for New Pressure Injury Added      Attending RN:  Jessica Islas RN     Second RN:  Loly Al LPN

## 2023-06-16 NOTE — PROGRESS NOTES
[facility]  Progress Note    Patient Name: Morgan Caro  MRN: [id number]  Patient Class: IP- Inpatient   Admission Date: 6/14/2023  Length of Stay: 1 days  Attending Physician: Chase Tovar III, MD  Primary Care Provider: [facility]        Subjective:     Principal Problem:Thyrotoxicosis with thyrotoxic crisis        HPI:  Ms Caro is a 43-year-old -American female who presents to the emergency room, after suffering a reported syncopal event.  Unclear if she had any trauma but does complain of back pain, headaches, leg and chest wall pain.  She was recently admitted to the hospital on 6/9/2023 during which time she was admitted for thyrotoxicosis.  Reported medical history significant for hypothyroidism, hypertension, back grains and had a similar presentation back in March 2023 when she was admitted for thyrotoxicosis and was admitted in the ICU at the time.  Before that March hospitalization she had admission back in [facility] for 2 days and left AGAINST MEDICAL ADVICE from there.  After she was stabilized, she again left AGAINST MEDICAL ADVICE on 6/12/2023.    In the ER, she was noted to be initially tachycardic with 122 bpm, with blood pressure 117/70 her CBC appears grossly unremarkable, her BMP shows normal renal function, has elevated alk phos that has been there previously, minimally elevated troponin of 0.035 from demand ischemia, denies any chest pains, her TSH is less than 0.01 with elevated free T4 of 2.86.  Her x-ray of the thoracic spine is negative, lumbar spine is negative, DVT ultrasound negative for VTE, her CT head reveals heterogeneity in the calvarium that is multifocal and scattered with possibility of osseous metastatic disease, CT C-spine is negative for acute fractures.    She received IV propranolol followed by p.o.,  mg in the ER along with IV Decadron with control of her symptoms.  She is being admitted for further evaluation and  treatment.      Overview/Hospital Course:  No notes on file    Interval History: Pt stating she had experienced a lot of weight loss lately and requesting double portions on food. Pt understands need to get further evaluation. Plan for d/c to psych facility tomorrow back on home medications if continues to improve. Pt had issues with family members and staff yesterday, but is requesting to use the phone to call them. I will discuss with management.     Review of Systems  Objective:     Vital Signs (Most Recent):  Temp: 98.1 °F (36.7 °C) (06/16/23 1141)  Pulse: 84 (06/16/23 1141)  Resp: 20 (06/16/23 1141)  BP: 120/60 (06/16/23 1141)  SpO2: 98 % (06/16/23 1141) Vital Signs (24h Range):  Temp:  [97.4 °F (36.3 °C)-98.3 °F (36.8 °C)] 98.1 °F (36.7 °C)  Pulse:  [55-96] 84  Resp:  [16-20] 20  SpO2:  [95 %-99 %] 98 %  BP: (109-146)/(57-90) 120/60     Weight: 48.5 kg (107 lb)  Body mass index is 19.57 kg/m².    Intake/Output Summary (Last 24 hours) at 6/16/2023 1326  Last data filed at 6/15/2023 1727  Gross per 24 hour   Intake 240 ml   Output --   Net 240 ml         Physical Exam  Vitals reviewed.   Constitutional:       General: She is not in acute distress.     Appearance: She is ill-appearing (chronic).   HENT:      Mouth/Throat:      Mouth: Mucous membranes are moist.      Pharynx: Oropharynx is clear.   Eyes:      General: No scleral icterus.     Extraocular Movements: Extraocular movements intact.   Cardiovascular:      Rate and Rhythm: Normal rate and regular rhythm.   Skin:     General: Skin is warm and dry.   Neurological:      General: No focal deficit present.      Mental Status: She is alert and oriented to person, place, and time.   Psychiatric:         Mood and Affect: Mood normal.         Behavior: Behavior normal.           Significant Labs: All pertinent labs within the past 24 hours have been reviewed.    Significant Imaging: I have reviewed all pertinent imaging results/findings within the past 24  hours.      Assessment/Plan:      * Thyrotoxicosis with thyrotoxic crisis  Thyroid labs improving on current tx.    Transition propanolol to atenolol.   Methimazole 5 mg tid.., will d/c with 10 mg daily  titrating decadron  Recommend close outpatient endocrinology follow-up.  Continue to monitor daily thyroid labs    Syncope and collapse  No further episodes.  Echo with ef of 60%.   EKG with sinus tachycardia. No STEMI  Mild troponin elevation in setting of tachycardia and  appears likely demand related. No cp or sob noted.    -continue telemetry monitoring        Noncompliance by refusing intervention or support  PEC initiated in ER, psychiatry recommends continuing PEC.    -re consult for re-evaluation     Abnormal CT of the head   Apparently the lucencies were not present previously from prior CAT scan.  We will get heme-onc opinion.      Primary hypertension  Switch to atenolol. BP currently controlled.        VTE Risk Mitigation (From admission, onward)         Ordered     enoxaparin injection 40 mg  Daily         06/15/23 0246     IP VTE HIGH RISK PATIENT  Once         06/15/23 0246     Place sequential compression device  Until discontinued         06/15/23 0246                Discharge Planning   FLORY: 6/17/2023     Code Status: Full Code   Is the patient medically ready for discharge?:     Reason for patient still in hospital (select all that apply): Laboratory test, Treatment and Consult recommendations  Discharge Plan A: Psychiatric hospital                  Chase Tovar III, MD  Department of Hospital Medicine   [facility]

## 2023-06-16 NOTE — CONSULTS
Hematology- Oncology Consult Note :     6/16/2023      Reason for consult: Concern for metastatic disease        HPI    Pt is a 43-year-old -American female who presented to the emergency room, after suffering a reported syncopal event.  Of note was recently admitted to the hospital on 6/9/2023 dfor thyrotoxicosis.  In the ED, she was noted to be initially tachycardic with 122 bpm, with blood pressure 117/70  but elevated alk phos noted.  X-ray of the thoracic spine is negative, lumbar spine is negative, DVT ultrasound negative for VTE.  CT head was concerning for heterogeneity in the calvarium that is multifocal and scattered with concerns for osseous metastatic disease, CT C-spine is negative for acute fractures.        Active Problem List with Overview Notes    Diagnosis Date Noted    Abnormal CT of the head 06/15/2023    Noncompliance by refusing intervention or support 06/15/2023     PEC initiated in ER, awaiting psychiatry consult.      Syncope and collapse 06/15/2023     Continue cardiac monitoring  Check Echocardiogram.      Tobacco abuse counseling 06/09/2023    Migraines 06/09/2023    Enteritis 06/09/2023     Acute enteritis  Patient is having loose bowel movements.  Unlikely to be ileus or SBO.  We will check stool studies.  Could be related to thyrotoxicosis related hyperactive bowel, and possibly viral etiology.      Acute UTI 06/09/2023     Continue IV Zosyn for now. Denies particular urinary symptoms but does complain of diffuse abdominal pain with possible enteritis, pending stool studies.      Advanced care planning/counseling discussion 03/06/2023    Acute delirium 03/05/2023    Thyrotoxicosis with thyrotoxic crisis 03/04/2023    Primary hypertension 03/04/2023    Cardiomegaly 03/04/2023    Hypokalemia 03/04/2023       Patient Active Problem List    Diagnosis Date Noted    Abnormal CT of the head 06/15/2023    Noncompliance by refusing intervention or support 06/15/2023    Syncope and  collapse 06/15/2023    Tobacco abuse counseling 06/09/2023    Migraines 06/09/2023    Enteritis 06/09/2023    Acute UTI 06/09/2023    Advanced care planning/counseling discussion 03/06/2023    Acute delirium 03/05/2023    Thyrotoxicosis with thyrotoxic crisis 03/04/2023    Primary hypertension 03/04/2023    Cardiomegaly 03/04/2023    Hypokalemia 03/04/2023     Past Medical History:   Diagnosis Date    Depression     Migraines     Primary hypertension 03/04/2023    Thyroid disease       No past surgical history on file.   Medications Prior to Admission   Medication Sig Dispense Refill Last Dose    atenoloL (TENORMIN) 25 MG tablet Take 1 tablet (25 mg total) by mouth 2 (two) times a day. 180 tablet 3     EScitalopram oxalate (LEXAPRO) 5 MG Tab Take 1 tablet (5 mg total) by mouth once daily. 90 tablet 3     methIMAzole (TAPAZOLE) 10 MG Tab Take 1 tablet by mouth twice daily for 1 week, then reduce to 1 tablet by mouth once daily. Go to Endocrinologist for adjustments asap. 180 tablet 0     nicotine (NICODERM CQ) 21 mg/24 hr Place 1 patch onto the skin once daily. 84 patch 0      Review of patient's allergies indicates:   Allergen Reactions    Ibuprofen Hives and Edema      Social History     Tobacco Use    Smoking status: Every Day     Packs/day: 2.00     Types: Cigarettes    Smokeless tobacco: Never    Tobacco comments:     1 pack of cigarettes will last the pt 2 days.   Substance Use Topics    Alcohol use: Yes     Comment: two drinks every 3 days.      No family history on file.     Review of Systems :  Review of Systems   Constitutional:  Negative for fever and malaise/fatigue.   HENT:  Negative for congestion, hearing loss and nosebleeds.    Eyes:  Negative for blurred vision and photophobia.   Respiratory:  Negative for cough, sputum production and shortness of breath.    Cardiovascular:  Negative for chest pain, claudication and leg swelling.   Gastrointestinal:  Negative for abdominal pain and heartburn.    Genitourinary:  Negative for dysuria and hematuria.   Musculoskeletal:  Positive for joint pain and myalgias. Negative for back pain.   Skin:  Negative for itching and rash.   Neurological:  Negative for dizziness and focal weakness.   Endo/Heme/Allergies:  Does not bruise/bleed easily.   Psychiatric/Behavioral:  Negative for depression. The patient is not nervous/anxious.      Physical Exam :  Wt Readings from Last 3 Encounters:   06/15/23 48.5 kg (107 lb)   06/09/23 49.9 kg (110 lb)   03/05/23 53 kg (116 lb 13.5 oz)     Temp Readings from Last 3 Encounters:   06/16/23 98.1 °F (36.7 °C) (Oral)   06/11/23 98.2 °F (36.8 °C) (Oral)   03/08/23 98.4 °F (36.9 °C) (Oral)     BP Readings from Last 3 Encounters:   06/16/23 120/60   06/11/23 (!) 133/92   03/08/23 133/80     Pulse Readings from Last 3 Encounters:   06/16/23 84   06/11/23 (!) 114   03/08/23 78     Body mass index is 19.57 kg/m².    Physical Exam  Vitals reviewed.   HENT:      Head: Normocephalic and atraumatic.      Mouth/Throat:      Mouth: Mucous membranes are moist.   Eyes:      Pupils: Pupils are equal, round, and reactive to light.   Cardiovascular:      Rate and Rhythm: Normal rate and regular rhythm.      Heart sounds: Normal heart sounds.   Pulmonary:      Effort: Pulmonary effort is normal.      Breath sounds: Normal breath sounds.   Abdominal:      General: Abdomen is flat.   Musculoskeletal:         General: Normal range of motion.      Cervical back: Normal range of motion.   Neurological:      Mental Status: She is alert. Mental status is at baseline.         Pertinent Diagnostic studies:    Recent Results (from the past 24 hour(s))   Basic Metabolic Panel (BMP)    Collection Time: 06/16/23  5:02 AM   Result Value Ref Range    Sodium 139 136 - 145 mmol/L    Potassium 4.4 3.5 - 5.1 mmol/L    Chloride 106 95 - 110 mmol/L    CO2 27 23 - 29 mmol/L    Glucose 117 (H) 70 - 110 mg/dL    BUN 16 6 - 20 mg/dL    Creatinine 0.5 0.5 - 1.4 mg/dL    Calcium  9.1 8.7 - 10.5 mg/dL    Anion Gap 6 (L) 8 - 16 mmol/L    eGFR >60 >60 mL/min/1.73 m^2   Magnesium    Collection Time: 06/16/23  5:02 AM   Result Value Ref Range    Magnesium 1.5 (L) 1.6 - 2.6 mg/dL   T3    Collection Time: 06/16/23  5:02 AM   Result Value Ref Range    T3, Total 129 60 - 180 ng/dL   TSH    Collection Time: 06/16/23  5:02 AM   Result Value Ref Range    TSH <0.010 (L) 0.400 - 4.000 uIU/mL   T4    Collection Time: 06/16/23  5:02 AM   Result Value Ref Range    T4, Total 12.6 (H) 4.5 - 11.5 ug/dL   T4, Free    Collection Time: 06/16/23  5:02 AM   Result Value Ref Range    Free T4 2.10 (H) 0.71 - 1.51 ng/dL   POCT glucose    Collection Time: 06/16/23  7:57 AM   Result Value Ref Range    POCT Glucose 103 70 - 110 mg/dL   POCT glucose    Collection Time: 06/16/23 11:39 AM   Result Value Ref Range    POCT Glucose 104 70 - 110 mg/dL       Assessment/Recs :       Concern for bone mets  -There is heterogeneity seen of the calvarium with multifocal scattered lucencies visualized on CT head  Few small multifocal nonspecific lucencies within the cervical vertebral bodies on C spine CT  -No masses seen on recent CT abd/pelvis on 6/09/23  -consider CT chest and bone scan for further eval for now, if significant lucenscies seen but no mass cansider bone bx      Time spent on case: 15 minutes    Thank you for this consult, please contact us for any questions or concerns.    Qasim Hadley MD   Hematology/oncology, [facility]

## 2023-06-16 NOTE — NURSING
"[facility]  Rapid Response Nurse Intervention/ Task    Date of Visit: 06/15/2023  Time of Visit: 2200       INTERVENTIONS/ TASK Completed:     Attempted to place PIV per primary RN request. Pt is CEC'd with sitter at bedside. Pt not cooperative with requests on preferred positioning to attempt IV insertion but stated would allow this RN to attempt IV "only one time". When vein identified and about to stick patient patient swatted needle out of this RN's hand, jumped up abruptly, and told this RN to get out of her room  Able to retreive needle and dispose in sharps safely. Updated primary RN Jessica and Charge RN Jacki.   "

## 2023-06-16 NOTE — NURSING
[facility]  Nurses Note -- 4 Eyes      6/16/2023       Skin assessed on: Q Shift      [x] No Pressure Injuries Present    [x]Prevention Measures Documented    [] Yes LDA  for Pressure Injury Previously documented     [] Yes New Pressure Injury Discovered   [] LDA for New Pressure Injury Added      Attending RN:  Emma Vivas RN     Second RN:  TIM Lopez

## 2023-06-17 LAB
ANION GAP SERPL CALC-SCNC: 7 MMOL/L (ref 8–16)
BUN SERPL-MCNC: 17 MG/DL (ref 6–20)
CALCIUM SERPL-MCNC: 9.2 MG/DL (ref 8.7–10.5)
CHLORIDE SERPL-SCNC: 105 MMOL/L (ref 95–110)
CO2 SERPL-SCNC: 27 MMOL/L (ref 23–29)
CREAT SERPL-MCNC: 0.5 MG/DL (ref 0.5–1.4)
EST. GFR  (NO RACE VARIABLE): >60 ML/MIN/1.73 M^2
GLUCOSE SERPL-MCNC: 125 MG/DL (ref 70–110)
MAGNESIUM SERPL-MCNC: 1.5 MG/DL (ref 1.6–2.6)
POCT GLUCOSE: 106 MG/DL (ref 70–110)
POCT GLUCOSE: 165 MG/DL (ref 70–110)
POCT GLUCOSE: 168 MG/DL (ref 70–110)
POCT GLUCOSE: 92 MG/DL (ref 70–110)
POTASSIUM SERPL-SCNC: 4.1 MMOL/L (ref 3.5–5.1)
SODIUM SERPL-SCNC: 139 MMOL/L (ref 136–145)
T3 SERPL-MCNC: 133 NG/DL (ref 60–180)
T4 FREE SERPL-MCNC: 1.56 NG/DL (ref 0.71–1.51)
T4 SERPL-MCNC: 9.5 UG/DL (ref 4.5–11.5)
TROPONIN I SERPL DL<=0.01 NG/ML-MCNC: 0.01 NG/ML (ref 0–0.03)
TSH SERPL DL<=0.005 MIU/L-ACNC: <0.01 UIU/ML (ref 0.4–4)

## 2023-06-17 PROCEDURE — 36415 COLL VENOUS BLD VENIPUNCTURE: CPT | Performed by: STUDENT IN AN ORGANIZED HEALTH CARE EDUCATION/TRAINING PROGRAM

## 2023-06-17 PROCEDURE — 83735 ASSAY OF MAGNESIUM: CPT | Performed by: INTERNAL MEDICINE

## 2023-06-17 PROCEDURE — 84480 ASSAY TRIIODOTHYRONINE (T3): CPT | Performed by: STUDENT IN AN ORGANIZED HEALTH CARE EDUCATION/TRAINING PROGRAM

## 2023-06-17 PROCEDURE — 25000003 PHARM REV CODE 250: Performed by: STUDENT IN AN ORGANIZED HEALTH CARE EDUCATION/TRAINING PROGRAM

## 2023-06-17 PROCEDURE — G0378 HOSPITAL OBSERVATION PER HR: HCPCS

## 2023-06-17 PROCEDURE — 63600175 PHARM REV CODE 636 W HCPCS: Performed by: STUDENT IN AN ORGANIZED HEALTH CARE EDUCATION/TRAINING PROGRAM

## 2023-06-17 PROCEDURE — 25000003 PHARM REV CODE 250: Performed by: INTERNAL MEDICINE

## 2023-06-17 PROCEDURE — 84439 ASSAY OF FREE THYROXINE: CPT | Performed by: INTERNAL MEDICINE

## 2023-06-17 PROCEDURE — 84436 ASSAY OF TOTAL THYROXINE: CPT | Performed by: STUDENT IN AN ORGANIZED HEALTH CARE EDUCATION/TRAINING PROGRAM

## 2023-06-17 PROCEDURE — 96367 TX/PROPH/DG ADDL SEQ IV INF: CPT

## 2023-06-17 PROCEDURE — 84484 ASSAY OF TROPONIN QUANT: CPT | Performed by: STUDENT IN AN ORGANIZED HEALTH CARE EDUCATION/TRAINING PROGRAM

## 2023-06-17 PROCEDURE — 84443 ASSAY THYROID STIM HORMONE: CPT | Performed by: STUDENT IN AN ORGANIZED HEALTH CARE EDUCATION/TRAINING PROGRAM

## 2023-06-17 PROCEDURE — 21400001 HC TELEMETRY ROOM

## 2023-06-17 PROCEDURE — 80048 BASIC METABOLIC PNL TOTAL CA: CPT | Performed by: INTERNAL MEDICINE

## 2023-06-17 RX ORDER — METHIMAZOLE 5 MG/1
10 TABLET ORAL DAILY
Status: DISCONTINUED | OUTPATIENT
Start: 2023-06-18 | End: 2023-06-18 | Stop reason: HOSPADM

## 2023-06-17 RX ORDER — METHIMAZOLE 5 MG/1
5 TABLET ORAL 3 TIMES DAILY
Status: COMPLETED | OUTPATIENT
Start: 2023-06-17 | End: 2023-06-17

## 2023-06-17 RX ORDER — MAGNESIUM SULFATE HEPTAHYDRATE 40 MG/ML
2 INJECTION, SOLUTION INTRAVENOUS ONCE
Status: COMPLETED | OUTPATIENT
Start: 2023-06-17 | End: 2023-06-17

## 2023-06-17 RX ORDER — METHIMAZOLE 10 MG/1
10 TABLET ORAL
Qty: 180 TABLET | Refills: 0 | Status: SHIPPED | OUTPATIENT
Start: 2023-06-17 | End: 2023-06-18 | Stop reason: SDUPTHER

## 2023-06-17 RX ORDER — METHIMAZOLE 5 MG/1
5 TABLET ORAL 3 TIMES DAILY
Status: DISCONTINUED | OUTPATIENT
Start: 2023-06-17 | End: 2023-06-17

## 2023-06-17 RX ORDER — QUETIAPINE FUMARATE 100 MG/1
100 TABLET, FILM COATED ORAL NIGHTLY
Qty: 30 TABLET | Refills: 11 | Status: SHIPPED | OUTPATIENT
Start: 2023-06-17 | End: 2024-06-16

## 2023-06-17 RX ORDER — METHIMAZOLE 5 MG/1
10 TABLET ORAL DAILY
Status: DISCONTINUED | OUTPATIENT
Start: 2023-06-17 | End: 2023-06-17

## 2023-06-17 RX ORDER — ESCITALOPRAM OXALATE 5 MG/1
5 TABLET ORAL DAILY
Qty: 90 TABLET | Refills: 3 | Status: SHIPPED | OUTPATIENT
Start: 2023-06-17 | End: 2024-06-16

## 2023-06-17 RX ORDER — ATENOLOL 25 MG/1
25 TABLET ORAL 2 TIMES DAILY
Qty: 180 TABLET | Refills: 3 | Status: SHIPPED | OUTPATIENT
Start: 2023-06-17 | End: 2023-06-18 | Stop reason: SDUPTHER

## 2023-06-17 RX ADMIN — QUETIAPINE FUMARATE 100 MG: 100 TABLET ORAL at 09:06

## 2023-06-17 RX ADMIN — MAGNESIUM SULFATE HEPTAHYDRATE 2 G: 40 INJECTION, SOLUTION INTRAVENOUS at 04:06

## 2023-06-17 RX ADMIN — LORAZEPAM 0.5 MG: 0.5 TABLET ORAL at 03:06

## 2023-06-17 RX ADMIN — HYDROCODONE BITARTRATE AND ACETAMINOPHEN 1 TABLET: 5; 325 TABLET ORAL at 03:06

## 2023-06-17 RX ADMIN — ESCITALOPRAM 5 MG: 5 TABLET, FILM COATED ORAL at 09:06

## 2023-06-17 RX ADMIN — LORAZEPAM 0.5 MG: 0.5 TABLET ORAL at 09:06

## 2023-06-17 RX ADMIN — METHIMAZOLE 5 MG: 5 TABLET ORAL at 02:06

## 2023-06-17 RX ADMIN — DEXAMETHASONE 4 MG: 4 TABLET ORAL at 09:06

## 2023-06-17 RX ADMIN — HYDROCODONE BITARTRATE AND ACETAMINOPHEN 1 TABLET: 5; 325 TABLET ORAL at 09:06

## 2023-06-17 RX ADMIN — LORAZEPAM 0.5 MG: 0.5 TABLET ORAL at 01:06

## 2023-06-17 RX ADMIN — ATENOLOL 25 MG: 25 TABLET ORAL at 09:06

## 2023-06-17 RX ADMIN — METHIMAZOLE 10 MG: 5 TABLET ORAL at 09:06

## 2023-06-17 RX ADMIN — QUETIAPINE FUMARATE 25 MG: 25 TABLET ORAL at 03:06

## 2023-06-17 RX ADMIN — METHIMAZOLE 5 MG: 5 TABLET ORAL at 09:06

## 2023-06-17 NOTE — ASSESSMENT & PLAN NOTE
Thyroid labs improving on current tx, but still with elevated free t4 and depressed TSH    Transition propanolol to atenolol.   Methimazole 5 mg tid.., will d/c with 10 mg daily  titrating decadron down  Recommend close outpatient endocrinology follow-up. Referral sent for [employer] [facility] [employer]  Continue to monitor daily thyroid labs

## 2023-06-17 NOTE — ASSESSMENT & PLAN NOTE
Apparently the lucencies were not present previously from prior CAT scan.   Hematology/oncology consulted. Recommended ct chest check for possible cancer and if negative pt may need bone scan    -ct chest pending  -outpatient hematology f/u likely will need to be sent to [facility].

## 2023-06-17 NOTE — NURSING
[facility], [facility]  Nurses Note -- 4 Eyes      6/17/2023       Skin assessed on: Q Shift      [x] No Pressure Injuries Present    []Prevention Measures Documented    [] Yes LDA  for Pressure Injury Previously documented     [] Yes New Pressure Injury Discovered   [] LDA for New Pressure Injury Added      Attending RN:  Emma Vivas RN     Second RN:  TIM Quach

## 2023-06-17 NOTE — SUBJECTIVE & OBJECTIVE
Interval History: Pt states she thinks she is about ready to go home. No complaints at this time.     Review of Systems  Objective:     Vital Signs (Most Recent):  Temp: 97.9 °F (36.6 °C) (06/17/23 0750)  Pulse: 82 (06/17/23 0750)  Resp: 18 (06/17/23 0918)  BP: (!) 99/59 (06/17/23 0750)  SpO2: 97 % (06/17/23 0750) Vital Signs (24h Range):  Temp:  [97.9 °F (36.6 °C)-98.7 °F (37.1 °C)] 97.9 °F (36.6 °C)  Pulse:  [82-92] 82  Resp:  [16-20] 18  SpO2:  [94 %-98 %] 97 %  BP: ()/(56-78) 99/59     Weight: 48.5 kg (107 lb)  Body mass index is 19.57 kg/m².    Intake/Output Summary (Last 24 hours) at 6/17/2023 1102  Last data filed at 6/17/2023 0833  Gross per 24 hour   Intake 600 ml   Output --   Net 600 ml         Physical Exam  Vitals reviewed.   Constitutional:       General: She is not in acute distress.     Appearance: She is ill-appearing (chronic).   HENT:      Head: Normocephalic and atraumatic.      Mouth/Throat:      Mouth: Mucous membranes are moist.      Pharynx: Oropharynx is clear.   Eyes:      General: No scleral icterus.     Extraocular Movements: Extraocular movements intact.   Cardiovascular:      Rate and Rhythm: Normal rate and regular rhythm.   Pulmonary:      Effort: Pulmonary effort is normal. No respiratory distress.   Abdominal:      Palpations: Abdomen is soft.      Tenderness: There is no abdominal tenderness.   Skin:     General: Skin is warm and dry.   Neurological:      General: No focal deficit present.      Mental Status: She is alert and oriented to person, place, and time.   Psychiatric:         Mood and Affect: Mood normal.         Behavior: Behavior normal.           Significant Labs: All pertinent labs within the past 24 hours have been reviewed.    Significant Imaging: I have reviewed all pertinent imaging results/findings within the past 24 hours.

## 2023-06-17 NOTE — NURSING
Nurses Note -- 4 Eyes      6/17/2023   4:35 AM      Skin assessed during: Q Shift Change      [x] No Altered Skin Integrity Present    [x]Prevention Measures Documented      [] Yes- Altered Skin Integrity Present or Discovered   [] LDA Added if Not in Epic (Describe Wound)   [] New Altered Skin Integrity was Present on Admit and Documented in LDA   [] Wound Image Taken    Wound Care Consulted? No    Attending Nurse:  Philomena Calvo RN     Second RN/Staff Member:  Emma Vivas RN      Patient standing in door way. Instructed she is to remain inside the room and not at the door. Restless. Patient oriented. C/O pain all over. Will give pain med. See flow sheets for vital signs and assessment. Will continue to monitor.

## 2023-06-17 NOTE — HOSPITAL COURSE
43F with pmh of thyrotoxicosis presented initially for reported syncopal episode. She was recently admitted to the hospital on 6/9/2023 during which time she was admitted for thyrotoxicosis.  Reported medical history significant for hypothyroidism, hypertension, back grains and had a similar presentation back in March 2023 when she was admitted for thyrotoxicosis and was admitted in the ICU at the time.  Before that March hospitalization she had admission back in [facility] for 2 days and left AGAINST MEDICAL ADVICE from there.  After she was stabilized, she again left AGAINST MEDICAL ADVICE on 6/12/2023. In the ER, she was noted to be initially tachycardic with 122 bpm, with blood pressure 117/70 her CBC appears grossly unremarkable, her BMP shows normal renal function, has elevated alk phos that has been there previously, minimally elevated troponin of 0.035 from demand ischemia, denies any chest pains, her TSH is less than 0.01 with elevated free T4 of 2.86.  Her x-ray of the thoracic spine is negative, lumbar spine is negative, DVT ultrasound negative for VTE, her CT head reveals heterogeneity in the calvarium that is multifocal and scattered with possibility of osseous metastatic disease. Pt was placed under PEC by ED and initial psychiatry recommendations were to josué. Pt continued on tx for thyrotoxicosis. thyroid labs improving. Likely can d/c steroids tomorrow. Free T4 close to normalizing. Likely can transition to home doses of medications. Needed refills which have been sent. Psychiatry re-evaluated pt and recommended continuing PEC while inpatient, but can be rescinded at time of d/c if pt compliant during hospitalization.patient remains clomipramine with medications.alert and  oriented and calm,denies SI,HI at DC time,PEC discontinued.thyroid level is much improved,methimazole and atenolol prescribed,out patient hematology,endocrinology is done,patient was discharged home with follow up plan with  PCP,endocrinology as out patient.

## 2023-06-17 NOTE — PLAN OF CARE
Problem: Violence Risk or Actual  Goal: Anger and Impulse Control  Outcome: Ongoing, Progressing     Problem: Adult Inpatient Plan of Care  Goal: Optimal Comfort and Wellbeing  Outcome: Ongoing, Progressing     Problem: Fall Injury Risk  Goal: Absence of Fall and Fall-Related Injury  Outcome: Ongoing, Progressing

## 2023-06-17 NOTE — NURSING
Pt removed PIV again. Verified with Dr. Tovar that she still needs a PIV. Pt is a very hard stick and she losses her IV each day. Dr. Tovar would like another PIV, however, pt is refusing attempt. Dr. Tovar notified. Pt also refusing telemetry lead placement. Pt stated she does not like how we are treating her bc we're holding her captive.

## 2023-06-17 NOTE — ASSESSMENT & PLAN NOTE
PEC initiated in ER, psychiatry recommends continuing PEC.  -re consulted for re-evaluation. Psychiatry recommend continuing PEC while inpatient, but can discontinue at time of discharge as long as pt remains complaint with medications    -Needs outpatient psychiatry f/u on discharge  -seroquel 100 mg hs

## 2023-06-17 NOTE — PROGRESS NOTES
[facility] Medicine  Progress Note    Patient Name: Morgan Caro  MRN: [id number]  Patient Class: IP- Inpatient   Admission Date: 6/14/2023  Length of Stay: 2 days  Attending Physician: Chase Tovar III, MD  Primary Care Provider: [facility]        Subjective:     Principal Problem:Thyrotoxicosis with thyrotoxic crisis        HPI:  Ms Caro is a 43-year-old -American female who presents to the emergency room, after suffering a reported syncopal event.  Unclear if she had any trauma but does complain of back pain, headaches, leg and chest wall pain.  She was recently admitted to the hospital on 6/9/2023 during which time she was admitted for thyrotoxicosis.  Reported medical history significant for hypothyroidism, hypertension, back grains and had a similar presentation back in March 2023 when she was admitted for thyrotoxicosis and was admitted in the ICU at the time.  Before that March hospitalization she had admission back in [facility] for 2 days and left AGAINST MEDICAL ADVICE from there.  After she was stabilized, she again left AGAINST MEDICAL ADVICE on 6/12/2023.    In the ER, she was noted to be initially tachycardic with 122 bpm, with blood pressure 117/70 her CBC appears grossly unremarkable, her BMP shows normal renal function, has elevated alk phos that has been there previously, minimally elevated troponin of 0.035 from demand ischemia, denies any chest pains, her TSH is less than 0.01 with elevated free T4 of 2.86.  Her x-ray of the thoracic spine is negative, lumbar spine is negative, DVT ultrasound negative for VTE, her CT head reveals heterogeneity in the calvarium that is multifocal and scattered with possibility of osseous metastatic disease, CT C-spine is negative for acute fractures.    She received IV propranolol followed by p.o.,  mg in the ER along with IV Decadron with control of her symptoms.  She is being admitted for further evaluation and  treatment.      Overview/Hospital Course:  No notes on file    Interval History: Pt states she thinks she is about ready to go home. No complaints at this time.     Review of Systems  Objective:     Vital Signs (Most Recent):  Temp: 97.9 °F (36.6 °C) (06/17/23 0750)  Pulse: 82 (06/17/23 0750)  Resp: 18 (06/17/23 0918)  BP: (!) 99/59 (06/17/23 0750)  SpO2: 97 % (06/17/23 0750) Vital Signs (24h Range):  Temp:  [97.9 °F (36.6 °C)-98.7 °F (37.1 °C)] 97.9 °F (36.6 °C)  Pulse:  [82-92] 82  Resp:  [16-20] 18  SpO2:  [94 %-98 %] 97 %  BP: ()/(56-78) 99/59     Weight: 48.5 kg (107 lb)  Body mass index is 19.57 kg/m².    Intake/Output Summary (Last 24 hours) at 6/17/2023 1102  Last data filed at 6/17/2023 0833  Gross per 24 hour   Intake 600 ml   Output --   Net 600 ml         Physical Exam  Vitals reviewed.   Constitutional:       General: She is not in acute distress.     Appearance: She is ill-appearing (chronic).   HENT:      Head: Normocephalic and atraumatic.      Mouth/Throat:      Mouth: Mucous membranes are moist.      Pharynx: Oropharynx is clear.   Eyes:      General: No scleral icterus.     Extraocular Movements: Extraocular movements intact.   Cardiovascular:      Rate and Rhythm: Normal rate and regular rhythm.   Pulmonary:      Effort: Pulmonary effort is normal. No respiratory distress.   Abdominal:      Palpations: Abdomen is soft.      Tenderness: There is no abdominal tenderness.   Skin:     General: Skin is warm and dry.   Neurological:      General: No focal deficit present.      Mental Status: She is alert and oriented to person, place, and time.   Psychiatric:         Mood and Affect: Mood normal.         Behavior: Behavior normal.           Significant Labs: All pertinent labs within the past 24 hours have been reviewed.    Significant Imaging: I have reviewed all pertinent imaging results/findings within the past 24 hours.      Assessment/Plan:      * Thyrotoxicosis with thyrotoxic  crisis  Thyroid labs improving on current tx, but still with elevated free t4 and depressed TSH    Transition propanolol to atenolol.   Methimazole 5 mg tid.., will d/c with 10 mg daily  titrating decadron down  Recommend close outpatient endocrinology follow-up. Referral sent for [facility]  Continue to monitor daily thyroid labs    Syncope and collapse  No further episodes.  Echo with ef of 60%.   EKG with sinus tachycardia. No STEMI  Mild troponin elevation in setting of tachycardia and  appears likely demand related. No cp or sob noted.    -continue telemetry monitoring  -outpatient cardiology referral    Noncompliance by refusing intervention or support  PEC initiated in ER, psychiatry recommends continuing PEC.  -re consulted for re-evaluation. Psychiatry recommend continuing PEC while inpatient, but can discontinue at time of discharge as long as pt remains complaint with medications    -Needs outpatient psychiatry f/u on discharge  -seroquel 100 mg hs    Abnormal CT of the head   Apparently the lucencies were not present previously from prior CAT scan.   Hematology/oncology consulted. Recommended ct chest check for possible cancer and if negative pt may need bone scan    -ct chest pending  -outpatient hematology f/u likely will need to be sent to [facility].      Primary hypertension  Switch to atenolol. BP currently controlled.        VTE Risk Mitigation (From admission, onward)         Ordered     enoxaparin injection 40 mg  Daily         06/15/23 0246     IP VTE HIGH RISK PATIENT  Once         06/15/23 0246     Place sequential compression device  Until discontinued         06/15/23 0246                Discharge Planning   FLORY: 6/17/2023     Code Status: Full Code   Is the patient medically ready for discharge?:     Reason for patient still in hospital (select all that apply): Laboratory test, Treatment, Imaging and Consult recommendations  Discharge Plan A: Count includes the [facility]                  Chase Tovar III,  MD  Department of Hospital Medicine   [facility] - Telemetry

## 2023-06-17 NOTE — ASSESSMENT & PLAN NOTE
No further episodes.  Echo with ef of 60%.   EKG with sinus tachycardia. No STEMI  Mild troponin elevation in setting of tachycardia and  appears likely demand related. No cp or sob noted.    -continue telemetry monitoring  -outpatient cardiology referral

## 2023-06-17 NOTE — PLAN OF CARE
Problem: Violence Risk or Actual  Goal: Anger and Impulse Control  Intervention: Minimize Safety Risk  Flowsheets (Taken 6/17/2023 0753)  Behavior Management:   behavioral plan reviewed   boundaries reinforced   impulse control promoted   security enhancements provided  Sensory Stimulation Regulation:   auditory stimulation minimized   care clustered   lighting decreased   tactile stimulation minimized   visitors limited  Enhanced Safety Measures:  at bedside  Intervention: Promote Self-Control  Flowsheets (Taken 6/17/2023 0753)  Supportive Measures: active listening utilized  Environmental Support: calm environment promoted     Problem: Adult Inpatient Plan of Care  Goal: Absence of Hospital-Acquired Illness or Injury  Intervention: Identify and Manage Fall Risk  Flowsheets (Taken 6/17/2023 0753)  Safety Promotion/Fall Prevention: side rails raised x 2  Intervention: Prevent Skin Injury  Flowsheets (Taken 6/17/2023 0753)  Body Position: position changed independently  Intervention: Prevent and Manage VTE (Venous Thromboembolism) Risk  Flowsheets (Taken 6/17/2023 0753)  Activity Management: Ambulated in room - L4  Intervention: Prevent Infection  Flowsheets (Taken 6/17/2023 0753)  Infection Prevention: hand hygiene promoted  Goal: Optimal Comfort and Wellbeing  Intervention: Monitor Pain and Promote Comfort  Flowsheets (Taken 6/17/2023 0753)  Pain Management Interventions: care clustered  Intervention: Provide Person-Centered Care  Flowsheets (Taken 6/17/2023 0753)  Trust Relationship/Rapport:   care explained   choices provided   emotional support provided   empathic listening provided   thoughts/feelings acknowledged   reassurance provided   questions encouraged   questions answered  Goal: Readiness for Transition of Care  Intervention: Mutually Develop Transition Plan  Flowsheets (Taken 6/17/2023 0753)  Equipment Currently Used at Home: none     Problem: Fall Injury Risk  Goal: Absence of Fall  and Fall-Related Injury  Intervention: Identify and Manage Contributors  Flowsheets (Taken 6/17/2023 0753)  Medication Review/Management: medications reviewed  Intervention: Promote Injury-Free Environment  Flowsheets (Taken 6/17/2023 0753)  Safety Promotion/Fall Prevention: side rails raised x 2     Problem: Pain Acute  Goal: Acceptable Pain Control and Functional Ability  Intervention: Develop Pain Management Plan  Flowsheets (Taken 6/17/2023 0753)  Pain Management Interventions: care clustered  Intervention: Prevent or Manage Pain  Flowsheets (Taken 6/17/2023 0753)  Sensory Stimulation Regulation:   auditory stimulation minimized   care clustered   lighting decreased   tactile stimulation minimized   visitors limited  Medication Review/Management: medications reviewed  Intervention: Optimize Psychosocial Wellbeing  Flowsheets (Taken 6/17/2023 0753)  Supportive Measures: active listening utilized      POC discussed with patient. Questions and concerns addressed. Fall/safety precautions implemented. Ambulates to rest room and in room. Blood glucose monitored. Restless at the beginning of shift. Asleep at present.  Please see flowsheets for full assessment and vital signs. Bed side attendant present. Bed locked in lowest position. Side rails up x2. Call bell with in reach. Will continue to monitor.

## 2023-06-18 VITALS
RESPIRATION RATE: 18 BRPM | WEIGHT: 108.25 LBS | BODY MASS INDEX: 19.92 KG/M2 | HEIGHT: 62 IN | OXYGEN SATURATION: 95 % | SYSTOLIC BLOOD PRESSURE: 113 MMHG | HEART RATE: 93 BPM | DIASTOLIC BLOOD PRESSURE: 60 MMHG | TEMPERATURE: 99 F

## 2023-06-18 LAB
BACTERIA BLD CULT: NORMAL
BACTERIA BLD CULT: NORMAL
POCT GLUCOSE: 171 MG/DL (ref 70–110)
T3 SERPL-MCNC: 106 NG/DL (ref 60–180)
T4 FREE SERPL-MCNC: 1.45 NG/DL (ref 0.71–1.51)
T4 SERPL-MCNC: 8.8 UG/DL (ref 4.5–11.5)
TSH SERPL DL<=0.005 MIU/L-ACNC: <0.01 UIU/ML (ref 0.4–4)

## 2023-06-18 PROCEDURE — 25000003 PHARM REV CODE 250: Performed by: STUDENT IN AN ORGANIZED HEALTH CARE EDUCATION/TRAINING PROGRAM

## 2023-06-18 PROCEDURE — 84439 ASSAY OF FREE THYROXINE: CPT | Performed by: STUDENT IN AN ORGANIZED HEALTH CARE EDUCATION/TRAINING PROGRAM

## 2023-06-18 PROCEDURE — 84480 ASSAY TRIIODOTHYRONINE (T3): CPT | Performed by: STUDENT IN AN ORGANIZED HEALTH CARE EDUCATION/TRAINING PROGRAM

## 2023-06-18 PROCEDURE — 36415 COLL VENOUS BLD VENIPUNCTURE: CPT | Performed by: STUDENT IN AN ORGANIZED HEALTH CARE EDUCATION/TRAINING PROGRAM

## 2023-06-18 PROCEDURE — 63600175 PHARM REV CODE 636 W HCPCS: Performed by: STUDENT IN AN ORGANIZED HEALTH CARE EDUCATION/TRAINING PROGRAM

## 2023-06-18 PROCEDURE — 84443 ASSAY THYROID STIM HORMONE: CPT | Performed by: STUDENT IN AN ORGANIZED HEALTH CARE EDUCATION/TRAINING PROGRAM

## 2023-06-18 PROCEDURE — 84436 ASSAY OF TOTAL THYROXINE: CPT | Performed by: STUDENT IN AN ORGANIZED HEALTH CARE EDUCATION/TRAINING PROGRAM

## 2023-06-18 PROCEDURE — G0378 HOSPITAL OBSERVATION PER HR: HCPCS

## 2023-06-18 RX ORDER — METHIMAZOLE 10 MG/1
10 TABLET ORAL
Qty: 180 TABLET | Refills: 0 | Status: SHIPPED | OUTPATIENT
Start: 2023-06-18 | End: 2023-11-11 | Stop reason: SDUPTHER

## 2023-06-18 RX ORDER — ATENOLOL 25 MG/1
25 TABLET ORAL DAILY
Qty: 180 TABLET | Refills: 3 | Status: SHIPPED | OUTPATIENT
Start: 2023-06-18 | End: 2023-12-11

## 2023-06-18 RX ADMIN — HYDROCODONE BITARTRATE AND ACETAMINOPHEN 1 TABLET: 5; 325 TABLET ORAL at 10:06

## 2023-06-18 RX ADMIN — DEXAMETHASONE 4 MG: 4 TABLET ORAL at 09:06

## 2023-06-18 RX ADMIN — ESCITALOPRAM 5 MG: 5 TABLET, FILM COATED ORAL at 09:06

## 2023-06-18 RX ADMIN — ATENOLOL 25 MG: 25 TABLET ORAL at 09:06

## 2023-06-18 RX ADMIN — METHIMAZOLE 10 MG: 5 TABLET ORAL at 09:06

## 2023-06-18 NOTE — PLAN OF CARE
06/18/23 0800   Final Note   Assessment Type Final Discharge Note   Anticipated Discharge Disposition Home  (Extensive Follow-up resources)   What phone number can be called within the next 1-3 days to see how you are doing after discharge?   ([phone])   Hospital Resources/Appts/Education Provided Appointments scheduled by Navigator/Coordinator;Community resources provided;Post-Acute resouces added to AVS;Provided education on problems/symptoms using teachback;Appointments scheduled and added to AVS;Provided patient/caregiver with written discharge plan information   Post-Acute Status   Post-Acute Authorization Other  (Home; extensive follow-up resources)   Coverage Medicaid: [facility]   Other Status No Post-Acute Service Needs   Discharge Delays None known at this time

## 2023-06-18 NOTE — NURSING
Nurses Note -- 4 Eyes      6/17/2023   7:25 PM      Skin assessed during: Q Shift Change      [x] No Altered Skin Integrity Present    [x]Prevention Measures Documented      [] Yes- Altered Skin Integrity Present or Discovered   [] LDA Added if Not in Epic (Describe Wound)   [] New Altered Skin Integrity was Present on Admit and Documented in LDA   [] Wound Image Taken    Wound Care Consulted? No    Attending Nurse:  Philomena Calvo RN     Second RN/Staff Member:  April D      Patient standing in door way. Instructed to go back in room. Restless. Oriented. Refusing IV placement. Will continue to monitor. See flow sheet for assessment and vital signs.

## 2023-06-18 NOTE — PLAN OF CARE
06/18/23 0758   Post-Acute Status   Post-Acute Authorization Other  (Home; follow-ups with extensive resources)   Coverage Medicaid-C   Other Status No Post-Acute Service Needs   Hospital Resources/Appts/Education Provided Appointments scheduled by Navigator/Coordinator;Provided education on problems/symptoms using teachback;Provided patient/caregiver with written discharge plan information;Appointments scheduled and added to AVS;Community resources provided;Post-Acute resouces added to AVS   Discharge Delays None known at this time   Discharge Plan   Discharge Plan A Home with family  (Extensive Follow-up resources)   Discharge Plan B Home with family  (Extensive Follow-up resources)

## 2023-06-18 NOTE — NURSING
Discharge instructions explained to the patient. All questions answered. Pt aware to  discharge medications from preferred pharmacy. All belongings returned to the patient, including $4 cash. Pt to be discharged off floor once cab arrives for .

## 2023-06-18 NOTE — NURSING
CEC patient walking down alarcon refusing to return to room. Bridger Jiménez called. Patient returned to room before Security arrived. Security arrived and instructed patient to stay in room. Patient has removed EKG leads and refuses IV. Dr. Sepulveda present. Informed patient refusing IV. At present patient is in room and bedside attendant is present. Will continue to monitor.

## 2023-06-18 NOTE — PLAN OF CARE
Patient is ready and clear for discharge from Case Management's perspective; informed patient's nurse, April. Discussed and provided patient with written discharge instruction and education. Extensive medical and psychiatric resources were provided to patient for follow-up. Attempted contact with patient's daughter but no answer and inability to leave a message. LCSW will follow-up in contacting daughter.

## 2023-06-18 NOTE — NURSING
[facility]  Nurses Note -- 4 Eyes      6/18/2023       Skin assessed on: Q Shift      [x] No Pressure Injuries Present    []Prevention Measures Documented    [] Yes LDA  for Pressure Injury Previously documented     [] Yes New Pressure Injury Discovered   [] LDA for New Pressure Injury Added      Attending RN:  Emma Vivas RN     Second RN:  TIM Quach

## 2023-06-18 NOTE — NURSING
Cab ordered for patient to address provided. Per patient her mother will be there waiting for patient.

## 2023-06-18 NOTE — PLAN OF CARE
Problem: Violence Risk or Actual  Goal: Anger and Impulse Control  Outcome: Met     Problem: Adult Inpatient Plan of Care  Goal: Plan of Care Review  Outcome: Met  Goal: Patient-Specific Goal (Individualized)  Outcome: Met  Goal: Absence of Hospital-Acquired Illness or Injury  Outcome: Met  Goal: Optimal Comfort and Wellbeing  Outcome: Met  Goal: Readiness for Transition of Care  Outcome: Met     Problem: Fall Injury Risk  Goal: Absence of Fall and Fall-Related Injury  Outcome: Met     Problem: Pain Acute  Goal: Acceptable Pain Control and Functional Ability  Outcome: Met

## 2023-06-18 NOTE — PLAN OF CARE
Problem: Violence Risk or Actual  Goal: Anger and Impulse Control  Intervention: Minimize Safety Risk  Flowsheets (Taken 6/18/2023 0625)  Behavior Management:   behavioral plan reviewed   boundaries reinforced   impulse control promoted   security enhancements provided  Sensory Stimulation Regulation:   auditory stimulation minimized   care clustered   lighting decreased   quiet environment promoted   tactile stimulation minimized   visitors limited  Enhanced Safety Measures:  at bedside  Intervention: Promote Self-Control  Flowsheets (Taken 6/18/2023 0625)  Supportive Measures: active listening utilized  Environmental Support: calm environment promoted     Problem: Adult Inpatient Plan of Care  Goal: Absence of Hospital-Acquired Illness or Injury  Intervention: Identify and Manage Fall Risk  Flowsheets (Taken 6/18/2023 0625)  Safety Promotion/Fall Prevention:   side rails raised x 2   nonskid shoes/socks when out of bed  Intervention: Prevent Skin Injury  Flowsheets (Taken 6/18/2023 0625)  Body Position: position changed independently  Intervention: Prevent and Manage VTE (Venous Thromboembolism) Risk  Flowsheets (Taken 6/18/2023 0625)  Activity Management: Ambulated in room - L4  Intervention: Prevent Infection  Flowsheets (Taken 6/18/2023 0625)  Infection Prevention: hand hygiene promoted  Goal: Optimal Comfort and Wellbeing  Intervention: Monitor Pain and Promote Comfort  Flowsheets (Taken 6/18/2023 0625)  Pain Management Interventions: care clustered  Intervention: Provide Person-Centered Care  Flowsheets (Taken 6/18/2023 0625)  Trust Relationship/Rapport:   care explained   choices provided   emotional support provided   empathic listening provided   thoughts/feelings acknowledged   reassurance provided   questions encouraged   questions answered  Goal: Readiness for Transition of Care  Intervention: Mutually Develop Transition Plan  Flowsheets (Taken 6/18/2023 0625)  Equipment Currently Used  at Home: none     Problem: Fall Injury Risk  Goal: Absence of Fall and Fall-Related Injury  Intervention: Identify and Manage Contributors  Flowsheets (Taken 6/18/2023 0625)  Medication Review/Management: medications reviewed  Intervention: Promote Injury-Free Environment  Flowsheets (Taken 6/18/2023 0625)  Safety Promotion/Fall Prevention:   side rails raised x 2   nonskid shoes/socks when out of bed     Problem: Pain Acute  Goal: Acceptable Pain Control and Functional Ability  Intervention: Develop Pain Management Plan  Flowsheets (Taken 6/18/2023 0625)  Pain Management Interventions: care clustered  Intervention: Prevent or Manage Pain  Flowsheets (Taken 6/18/2023 0625)  Sleep/Rest Enhancement: awakenings minimized  Sensory Stimulation Regulation:   auditory stimulation minimized   care clustered   lighting decreased   quiet environment promoted   tactile stimulation minimized   visitors limited  Medication Review/Management: medications reviewed  Intervention: Optimize Psychosocial Wellbeing  Flowsheets (Taken 6/18/2023 0625)  Supportive Measures: active listening utilized    POC discussed with patient. Questions and concerns addressed. Fall/safety precautions implemented.  Asleep at present.  at bed side. Please see flowsheets for full assessment and vital signs. Bed locked in lowest position. Side rails up x2. Call bell with in reach. Will continue to monitor.

## 2023-06-18 NOTE — NURSING
Attempted to reach daughter to discuss discharge. No answer, voicemail box not set up to receive messages.

## 2023-06-19 ENCOUNTER — PES CALL (OUTPATIENT)
Dept: ADMINISTRATIVE | Facility: CLINIC | Age: 43
End: 2023-06-19
Payer: MEDICAID

## 2023-06-20 ENCOUNTER — PATIENT OUTREACH (OUTPATIENT)
Dept: ADMINISTRATIVE | Facility: CLINIC | Age: 43
End: 2023-06-20
Payer: MEDICAID

## 2023-06-20 ENCOUNTER — PES CALL (OUTPATIENT)
Dept: ADMINISTRATIVE | Facility: CLINIC | Age: 43
End: 2023-06-20
Payer: MEDICAID

## 2023-06-20 NOTE — PROGRESS NOTES
C3 nurse attempted to contact Morgan Caro for a TCC post hospital discharge follow up call. No answer. The patient does not have a scheduled HOSFU appointment, and the pt does not have an [facility] PCP.

## 2023-06-20 NOTE — PROGRESS NOTES
C3 nurse 2nd attempt to contact Morgan Caro for a TCC post hospital discharge follow up call. Not available at this time/patient is sleeping; requested callback. The patient does not have a scheduled HOSFU appointment, and the pt does not have an [facility] PCP.

## 2023-06-21 NOTE — PROGRESS NOTES
C3 nurse 3rd attempt to contact Morgan Caro for a TCC post hospital discharge follow up call. No answer. The patient does not have a scheduled HOSFU appointment, and the pt does not have an [facility] PCP.

## 2023-06-22 ENCOUNTER — PES CALL (OUTPATIENT)
Dept: ADMINISTRATIVE | Facility: CLINIC | Age: 43
End: 2023-06-22
Payer: MEDICAID

## 2023-08-18 ENCOUNTER — HOSPITAL ENCOUNTER (INPATIENT)
Facility: OTHER | Age: 43
LOS: 3 days | Discharge: LEFT BEFORE DISCHARGE | DRG: 644 | End: 2023-08-21
Attending: INTERNAL MEDICINE | Admitting: INTERNAL MEDICINE
Payer: MEDICAID

## 2023-08-18 DIAGNOSIS — R00.0 TACHYCARDIA: ICD-10-CM

## 2023-08-18 DIAGNOSIS — E05.90 HYPERTHYROIDISM: ICD-10-CM

## 2023-08-18 DIAGNOSIS — R78.81 STAPHYLOCOCCUS AUREUS BACTEREMIA: ICD-10-CM

## 2023-08-18 DIAGNOSIS — K52.9 COLITIS: ICD-10-CM

## 2023-08-18 DIAGNOSIS — B95.61 STAPHYLOCOCCUS AUREUS BACTEREMIA: ICD-10-CM

## 2023-08-18 DIAGNOSIS — R10.9 ABDOMINAL PAIN: ICD-10-CM

## 2023-08-18 DIAGNOSIS — R07.9 CHEST PAIN: ICD-10-CM

## 2023-08-18 DIAGNOSIS — E86.0 DEHYDRATION: ICD-10-CM

## 2023-08-18 DIAGNOSIS — R19.7 DIARRHEA, UNSPECIFIED TYPE: Primary | ICD-10-CM

## 2023-08-18 LAB
ALBUMIN SERPL BCP-MCNC: 4 G/DL (ref 3.5–5.2)
ALP SERPL-CCNC: 287 U/L (ref 55–135)
ALT SERPL W/O P-5'-P-CCNC: 43 U/L (ref 10–44)
ANION GAP SERPL CALC-SCNC: 16 MMOL/L (ref 8–16)
AST SERPL-CCNC: 37 U/L (ref 10–40)
BASOPHILS # BLD AUTO: 0.04 K/UL (ref 0–0.2)
BASOPHILS NFR BLD: 0.4 % (ref 0–1.9)
BILIRUB SERPL-MCNC: 1.7 MG/DL (ref 0.1–1)
BUN SERPL-MCNC: 16 MG/DL (ref 6–20)
CALCIUM SERPL-MCNC: 10.4 MG/DL (ref 8.7–10.5)
CHLORIDE SERPL-SCNC: 101 MMOL/L (ref 95–110)
CO2 SERPL-SCNC: 19 MMOL/L (ref 23–29)
CREAT SERPL-MCNC: 0.4 MG/DL (ref 0.5–1.4)
CREAT SERPL-MCNC: 0.6 MG/DL (ref 0.5–1.4)
DIFFERENTIAL METHOD: ABNORMAL
EOSINOPHIL # BLD AUTO: 0.1 K/UL (ref 0–0.5)
EOSINOPHIL NFR BLD: 0.5 % (ref 0–8)
ERYTHROCYTE [DISTWIDTH] IN BLOOD BY AUTOMATED COUNT: 16.7 % (ref 11.5–14.5)
EST. GFR  (NO RACE VARIABLE): >60 ML/MIN/1.73 M^2
GLUCOSE SERPL-MCNC: 83 MG/DL (ref 70–110)
HCT VFR BLD AUTO: 46.1 % (ref 37–48.5)
HGB BLD-MCNC: 14.7 G/DL (ref 12–16)
IMM GRANULOCYTES # BLD AUTO: 0.02 K/UL (ref 0–0.04)
IMM GRANULOCYTES NFR BLD AUTO: 0.2 % (ref 0–0.5)
LIPASE SERPL-CCNC: 58 U/L (ref 4–60)
LYMPHOCYTES # BLD AUTO: 2.1 K/UL (ref 1–4.8)
LYMPHOCYTES NFR BLD: 19.8 % (ref 18–48)
MCH RBC QN AUTO: 23.3 PG (ref 27–31)
MCHC RBC AUTO-ENTMCNC: 31.9 G/DL (ref 32–36)
MCV RBC AUTO: 73 FL (ref 82–98)
MONOCYTES # BLD AUTO: 1 K/UL (ref 0.3–1)
MONOCYTES NFR BLD: 9.4 % (ref 4–15)
NEUTROPHILS # BLD AUTO: 7.3 K/UL (ref 1.8–7.7)
NEUTROPHILS NFR BLD: 69.7 % (ref 38–73)
NRBC BLD-RTO: 0 /100 WBC
PLATELET # BLD AUTO: 319 K/UL (ref 150–450)
PMV BLD AUTO: 10.7 FL (ref 9.2–12.9)
POTASSIUM SERPL-SCNC: 4.3 MMOL/L (ref 3.5–5.1)
PROT SERPL-MCNC: 8.6 G/DL (ref 6–8.4)
RBC # BLD AUTO: 6.31 M/UL (ref 4–5.4)
SAMPLE: ABNORMAL
SODIUM SERPL-SCNC: 136 MMOL/L (ref 136–145)
T4 FREE SERPL-MCNC: 2.85 NG/DL (ref 0.71–1.51)
TROPONIN I SERPL DL<=0.01 NG/ML-MCNC: <0.006 NG/ML (ref 0–0.03)
TSH SERPL DL<=0.005 MIU/L-ACNC: <0.01 UIU/ML (ref 0.4–4)
WBC # BLD AUTO: 10.48 K/UL (ref 3.9–12.7)

## 2023-08-18 PROCEDURE — 84443 ASSAY THYROID STIM HORMONE: CPT | Performed by: INTERNAL MEDICINE

## 2023-08-18 PROCEDURE — 82565 ASSAY OF CREATININE: CPT

## 2023-08-18 PROCEDURE — 12000002 HC ACUTE/MED SURGE SEMI-PRIVATE ROOM

## 2023-08-18 PROCEDURE — 83690 ASSAY OF LIPASE: CPT | Performed by: INTERNAL MEDICINE

## 2023-08-18 PROCEDURE — 93005 ELECTROCARDIOGRAM TRACING: CPT

## 2023-08-18 PROCEDURE — 99285 EMERGENCY DEPT VISIT HI MDM: CPT | Mod: 25

## 2023-08-18 PROCEDURE — 85025 COMPLETE CBC W/AUTO DIFF WBC: CPT | Performed by: INTERNAL MEDICINE

## 2023-08-18 PROCEDURE — 84439 ASSAY OF FREE THYROXINE: CPT | Performed by: INTERNAL MEDICINE

## 2023-08-18 PROCEDURE — 96360 HYDRATION IV INFUSION INIT: CPT

## 2023-08-18 PROCEDURE — 25500020 PHARM REV CODE 255: Performed by: INTERNAL MEDICINE

## 2023-08-18 PROCEDURE — 25000003 PHARM REV CODE 250: Performed by: INTERNAL MEDICINE

## 2023-08-18 PROCEDURE — 36415 COLL VENOUS BLD VENIPUNCTURE: CPT | Performed by: INTERNAL MEDICINE

## 2023-08-18 PROCEDURE — 93010 EKG 12-LEAD: ICD-10-PCS | Mod: ,,, | Performed by: INTERNAL MEDICINE

## 2023-08-18 PROCEDURE — 81025 URINE PREGNANCY TEST: CPT | Performed by: INTERNAL MEDICINE

## 2023-08-18 PROCEDURE — 82803 BLOOD GASES ANY COMBINATION: CPT

## 2023-08-18 PROCEDURE — 84480 ASSAY TRIIODOTHYRONINE (T3): CPT | Performed by: INTERNAL MEDICINE

## 2023-08-18 PROCEDURE — 84484 ASSAY OF TROPONIN QUANT: CPT | Performed by: INTERNAL MEDICINE

## 2023-08-18 PROCEDURE — 93010 ELECTROCARDIOGRAM REPORT: CPT | Mod: ,,, | Performed by: INTERNAL MEDICINE

## 2023-08-18 PROCEDURE — 80053 COMPREHEN METABOLIC PANEL: CPT | Performed by: INTERNAL MEDICINE

## 2023-08-18 PROCEDURE — 99900035 HC TECH TIME PER 15 MIN (STAT)

## 2023-08-18 RX ORDER — MAG HYDROX/ALUMINUM HYD/SIMETH 200-200-20
5 SUSPENSION, ORAL (FINAL DOSE FORM) ORAL
Status: COMPLETED | OUTPATIENT
Start: 2023-08-18 | End: 2023-08-18

## 2023-08-18 RX ORDER — DICYCLOMINE HYDROCHLORIDE 10 MG/1
20 CAPSULE ORAL
Status: COMPLETED | OUTPATIENT
Start: 2023-08-18 | End: 2023-08-18

## 2023-08-18 RX ORDER — ACETAMINOPHEN 500 MG
1000 TABLET ORAL
Status: COMPLETED | OUTPATIENT
Start: 2023-08-18 | End: 2023-08-18

## 2023-08-18 RX ADMIN — SODIUM CHLORIDE 1000 ML: 9 INJECTION, SOLUTION INTRAVENOUS at 10:08

## 2023-08-18 RX ADMIN — IOHEXOL 75 ML: 350 INJECTION, SOLUTION INTRAVENOUS at 10:08

## 2023-08-18 RX ADMIN — DICYCLOMINE HYDROCHLORIDE 20 MG: 10 CAPSULE ORAL at 10:08

## 2023-08-18 RX ADMIN — ACETAMINOPHEN 1000 MG: 500 TABLET ORAL at 10:08

## 2023-08-18 RX ADMIN — ALUMINUM HYDROXIDE, MAGNESIUM HYDROXIDE, AND SIMETHICONE 5 ML: 200; 200; 20 SUSPENSION ORAL at 10:08

## 2023-08-18 NOTE — Clinical Note
Diagnosis: Colitis [259126]   Future Attending Provider: BIJAN MILTON [[id number]]   Admitting Provider:: BIJAN MILTON [[id number]]

## 2023-08-19 PROBLEM — F11.10 OPIOID ABUSE: Status: ACTIVE | Noted: 2023-08-19

## 2023-08-19 PROBLEM — R10.9 ABDOMINAL PAIN: Status: ACTIVE | Noted: 2023-08-19

## 2023-08-19 PROBLEM — E05.90 HYPERTHYROIDISM: Status: ACTIVE | Noted: 2023-08-19

## 2023-08-19 LAB
AMPHET+METHAMPHET UR QL: NEGATIVE
ANION GAP SERPL CALC-SCNC: 12 MMOL/L (ref 8–16)
B-HCG UR QL: NEGATIVE
BACTERIA #/AREA URNS HPF: ABNORMAL /HPF
BARBITURATES UR QL SCN>200 NG/ML: NEGATIVE
BENZODIAZ UR QL SCN>200 NG/ML: NEGATIVE
BILIRUB UR QL STRIP: ABNORMAL
BUN SERPL-MCNC: 11 MG/DL (ref 6–20)
BZE UR QL SCN: NEGATIVE
CALCIUM SERPL-MCNC: 9.9 MG/DL (ref 8.7–10.5)
CANNABINOIDS UR QL SCN: ABNORMAL
CHLORIDE SERPL-SCNC: 105 MMOL/L (ref 95–110)
CLARITY UR: CLEAR
CO2 SERPL-SCNC: 18 MMOL/L (ref 23–29)
COLOR UR: YELLOW
CREAT SERPL-MCNC: 0.6 MG/DL (ref 0.5–1.4)
CREAT UR-MCNC: 72.1 MG/DL (ref 15–325)
CTP QC/QA: YES
EST. GFR  (NO RACE VARIABLE): >60 ML/MIN/1.73 M^2
ESTIMATED AVG GLUCOSE: 108 MG/DL (ref 68–131)
ETHANOL UR-MCNC: <10 MG/DL
GLUCOSE SERPL-MCNC: 146 MG/DL (ref 70–110)
GLUCOSE UR QL STRIP: NEGATIVE
HBA1C MFR BLD: 5.4 % (ref 4–5.6)
HGB UR QL STRIP: ABNORMAL
HYALINE CASTS #/AREA URNS LPF: 0 /LPF
KETONES UR QL STRIP: ABNORMAL
LACTATE SERPL-SCNC: 1.6 MMOL/L (ref 0.5–2.2)
LEUKOCYTE ESTERASE UR QL STRIP: NEGATIVE
MAGNESIUM SERPL-MCNC: 1.8 MG/DL (ref 1.6–2.6)
METHADONE UR QL SCN>300 NG/ML: NEGATIVE
MICROSCOPIC COMMENT: ABNORMAL
NITRITE UR QL STRIP: NEGATIVE
OPIATES UR QL SCN: NEGATIVE
PCP UR QL SCN>25 NG/ML: NEGATIVE
PH UR STRIP: 7 [PH] (ref 5–8)
POCT GLUCOSE: 147 MG/DL (ref 70–110)
POTASSIUM SERPL-SCNC: 4 MMOL/L (ref 3.5–5.1)
PROT UR QL STRIP: ABNORMAL
RBC #/AREA URNS HPF: 5 /HPF (ref 0–4)
SODIUM SERPL-SCNC: 135 MMOL/L (ref 136–145)
SP GR UR STRIP: <=1.005 (ref 1–1.03)
SQUAMOUS #/AREA URNS HPF: 3 /HPF
T3 SERPL-MCNC: 411 NG/DL (ref 60–180)
TOXICOLOGY INFORMATION: ABNORMAL
URN SPEC COLLECT METH UR: ABNORMAL
UROBILINOGEN UR STRIP-ACNC: NEGATIVE EU/DL
WBC #/AREA URNS HPF: 2 /HPF (ref 0–5)

## 2023-08-19 PROCEDURE — 82272 OCCULT BLD FECES 1-3 TESTS: CPT | Performed by: INTERNAL MEDICINE

## 2023-08-19 PROCEDURE — 87150 DNA/RNA AMPLIFIED PROBE: CPT | Mod: 59 | Performed by: INTERNAL MEDICINE

## 2023-08-19 PROCEDURE — 63600175 PHARM REV CODE 636 W HCPCS: Performed by: PHYSICIAN ASSISTANT

## 2023-08-19 PROCEDURE — A4216 STERILE WATER/SALINE, 10 ML: HCPCS | Performed by: PHYSICIAN ASSISTANT

## 2023-08-19 PROCEDURE — 99223 PR INITIAL HOSPITAL CARE,LEVL III: ICD-10-PCS | Mod: ,,, | Performed by: PHYSICIAN ASSISTANT

## 2023-08-19 PROCEDURE — 36415 COLL VENOUS BLD VENIPUNCTURE: CPT | Performed by: INTERNAL MEDICINE

## 2023-08-19 PROCEDURE — 25000003 PHARM REV CODE 250: Performed by: PHYSICIAN ASSISTANT

## 2023-08-19 PROCEDURE — 87425 ROTAVIRUS AG IA: CPT | Performed by: INTERNAL MEDICINE

## 2023-08-19 PROCEDURE — C1751 CATH, INF, PER/CENT/MIDLINE: HCPCS

## 2023-08-19 PROCEDURE — 76937 US GUIDE VASCULAR ACCESS: CPT

## 2023-08-19 PROCEDURE — 25000003 PHARM REV CODE 250: Performed by: INTERNAL MEDICINE

## 2023-08-19 PROCEDURE — 87427 SHIGA-LIKE TOXIN AG IA: CPT | Mod: 59 | Performed by: INTERNAL MEDICINE

## 2023-08-19 PROCEDURE — G0378 HOSPITAL OBSERVATION PER HR: HCPCS

## 2023-08-19 PROCEDURE — P9612 CATHETERIZE FOR URINE SPEC: HCPCS

## 2023-08-19 PROCEDURE — 63600175 PHARM REV CODE 636 W HCPCS

## 2023-08-19 PROCEDURE — 83993 ASSAY FOR CALPROTECTIN FECAL: CPT | Performed by: INTERNAL MEDICINE

## 2023-08-19 PROCEDURE — 81000 URINALYSIS NONAUTO W/SCOPE: CPT | Performed by: INTERNAL MEDICINE

## 2023-08-19 PROCEDURE — 99499 UNLISTED E&M SERVICE: CPT | Mod: ,,, | Performed by: INTERNAL MEDICINE

## 2023-08-19 PROCEDURE — 87040 BLOOD CULTURE FOR BACTERIA: CPT | Mod: 59 | Performed by: INTERNAL MEDICINE

## 2023-08-19 PROCEDURE — 83605 ASSAY OF LACTIC ACID: CPT | Performed by: INTERNAL MEDICINE

## 2023-08-19 PROCEDURE — 80048 BASIC METABOLIC PNL TOTAL CA: CPT | Performed by: INTERNAL MEDICINE

## 2023-08-19 PROCEDURE — 83036 HEMOGLOBIN GLYCOSYLATED A1C: CPT | Performed by: INTERNAL MEDICINE

## 2023-08-19 PROCEDURE — 63600175 PHARM REV CODE 636 W HCPCS: Performed by: INTERNAL MEDICINE

## 2023-08-19 PROCEDURE — 96361 HYDRATE IV INFUSION ADD-ON: CPT

## 2023-08-19 PROCEDURE — 83735 ASSAY OF MAGNESIUM: CPT | Performed by: INTERNAL MEDICINE

## 2023-08-19 PROCEDURE — 87046 STOOL CULTR AEROBIC BACT EA: CPT | Performed by: INTERNAL MEDICINE

## 2023-08-19 PROCEDURE — 87329 GIARDIA AG IA: CPT | Performed by: INTERNAL MEDICINE

## 2023-08-19 PROCEDURE — 82653 EL-1 FECAL QUANTITATIVE: CPT | Performed by: INTERNAL MEDICINE

## 2023-08-19 PROCEDURE — 87045 FECES CULTURE AEROBIC BACT: CPT | Performed by: INTERNAL MEDICINE

## 2023-08-19 PROCEDURE — 80307 DRUG TEST PRSMV CHEM ANLYZR: CPT | Performed by: INTERNAL MEDICINE

## 2023-08-19 PROCEDURE — 82705 FATS/LIPIDS FECES QUAL: CPT | Performed by: INTERNAL MEDICINE

## 2023-08-19 PROCEDURE — 87338 HPYLORI STOOL AG IA: CPT | Performed by: INTERNAL MEDICINE

## 2023-08-19 PROCEDURE — 36410 VNPNXR 3YR/> PHY/QHP DX/THER: CPT

## 2023-08-19 PROCEDURE — 99499 NO LOS: ICD-10-PCS | Mod: ,,, | Performed by: INTERNAL MEDICINE

## 2023-08-19 PROCEDURE — 94761 N-INVAS EAR/PLS OXIMETRY MLT: CPT

## 2023-08-19 PROCEDURE — 99223 1ST HOSP IP/OBS HIGH 75: CPT | Mod: ,,, | Performed by: PHYSICIAN ASSISTANT

## 2023-08-19 PROCEDURE — 87209 SMEAR COMPLEX STAIN: CPT | Performed by: INTERNAL MEDICINE

## 2023-08-19 PROCEDURE — 96372 THER/PROPH/DIAG INJ SC/IM: CPT | Performed by: PHYSICIAN ASSISTANT

## 2023-08-19 PROCEDURE — 89055 LEUKOCYTE ASSESSMENT FECAL: CPT | Performed by: INTERNAL MEDICINE

## 2023-08-19 PROCEDURE — 21400001 HC TELEMETRY ROOM

## 2023-08-19 PROCEDURE — 87449 NOS EACH ORGANISM AG IA: CPT | Performed by: INTERNAL MEDICINE

## 2023-08-19 PROCEDURE — 87449 NOS EACH ORGANISM AG IA: CPT | Mod: 91 | Performed by: INTERNAL MEDICINE

## 2023-08-19 RX ORDER — TALC
6 POWDER (GRAM) TOPICAL NIGHTLY PRN
Status: DISCONTINUED | OUTPATIENT
Start: 2023-08-19 | End: 2023-08-22 | Stop reason: HOSPADM

## 2023-08-19 RX ORDER — AMLODIPINE BESYLATE 5 MG/1
5 TABLET ORAL 2 TIMES DAILY
Status: DISCONTINUED | OUTPATIENT
Start: 2023-08-19 | End: 2023-08-22 | Stop reason: HOSPADM

## 2023-08-19 RX ORDER — DICYCLOMINE HYDROCHLORIDE 10 MG/1
10 CAPSULE ORAL EVERY 6 HOURS PRN
Status: DISCONTINUED | OUTPATIENT
Start: 2023-08-19 | End: 2023-08-22 | Stop reason: HOSPADM

## 2023-08-19 RX ORDER — ONDANSETRON 2 MG/ML
4 INJECTION INTRAMUSCULAR; INTRAVENOUS EVERY 6 HOURS PRN
Status: DISCONTINUED | OUTPATIENT
Start: 2023-08-19 | End: 2023-08-22 | Stop reason: HOSPADM

## 2023-08-19 RX ORDER — BUPRENORPHINE AND NALOXONE 4; 1 MG/1; MG/1
1 FILM, SOLUBLE BUCCAL; SUBLINGUAL EVERY 4 HOURS PRN
Status: DISCONTINUED | OUTPATIENT
Start: 2023-08-20 | End: 2023-08-19

## 2023-08-19 RX ORDER — BUPRENORPHINE AND NALOXONE 4; 1 MG/1; MG/1
1 FILM, SOLUBLE BUCCAL; SUBLINGUAL EVERY 4 HOURS PRN
Status: DISCONTINUED | OUTPATIENT
Start: 2023-08-19 | End: 2023-08-22 | Stop reason: HOSPADM

## 2023-08-19 RX ORDER — BUPRENORPHINE AND NALOXONE 2; .5 MG/1; MG/1
1 FILM, SOLUBLE BUCCAL; SUBLINGUAL DAILY
Status: DISCONTINUED | OUTPATIENT
Start: 2023-08-19 | End: 2023-08-19

## 2023-08-19 RX ORDER — ACETAMINOPHEN 325 MG/1
650 TABLET ORAL EVERY 6 HOURS PRN
Status: DISCONTINUED | OUTPATIENT
Start: 2023-08-19 | End: 2023-08-22 | Stop reason: HOSPADM

## 2023-08-19 RX ORDER — HEPARIN SODIUM 5000 [USP'U]/ML
5000 INJECTION, SOLUTION INTRAVENOUS; SUBCUTANEOUS EVERY 8 HOURS
Status: DISCONTINUED | OUTPATIENT
Start: 2023-08-19 | End: 2023-08-22 | Stop reason: HOSPADM

## 2023-08-19 RX ORDER — CIPROFLOXACIN 2 MG/ML
400 INJECTION, SOLUTION INTRAVENOUS
Status: DISCONTINUED | OUTPATIENT
Start: 2023-08-19 | End: 2023-08-20

## 2023-08-19 RX ORDER — OXYCODONE HYDROCHLORIDE 5 MG/1
5 TABLET ORAL EVERY 4 HOURS PRN
Status: DISCONTINUED | OUTPATIENT
Start: 2023-08-19 | End: 2023-08-19

## 2023-08-19 RX ORDER — AMOXICILLIN 250 MG
1 CAPSULE ORAL 2 TIMES DAILY PRN
Status: DISCONTINUED | OUTPATIENT
Start: 2023-08-19 | End: 2023-08-22 | Stop reason: HOSPADM

## 2023-08-19 RX ORDER — NALOXONE HCL 0.4 MG/ML
0.02 VIAL (ML) INJECTION
Status: DISCONTINUED | OUTPATIENT
Start: 2023-08-19 | End: 2023-08-22 | Stop reason: HOSPADM

## 2023-08-19 RX ORDER — SODIUM CHLORIDE 9 MG/ML
INJECTION, SOLUTION INTRAVENOUS CONTINUOUS
Status: DISCONTINUED | OUTPATIENT
Start: 2023-08-19 | End: 2023-08-20

## 2023-08-19 RX ORDER — SODIUM CHLORIDE 0.9 % (FLUSH) 0.9 %
10 SYRINGE (ML) INJECTION EVERY 8 HOURS
Status: DISCONTINUED | OUTPATIENT
Start: 2023-08-19 | End: 2023-08-22 | Stop reason: HOSPADM

## 2023-08-19 RX ORDER — METHIMAZOLE 5 MG/1
5 TABLET ORAL 3 TIMES DAILY
Status: DISCONTINUED | OUTPATIENT
Start: 2023-08-19 | End: 2023-08-22 | Stop reason: HOSPADM

## 2023-08-19 RX ORDER — HYDROMORPHONE HYDROCHLORIDE 1 MG/ML
0.2 INJECTION, SOLUTION INTRAMUSCULAR; INTRAVENOUS; SUBCUTANEOUS EVERY 6 HOURS PRN
Status: DISCONTINUED | OUTPATIENT
Start: 2023-08-19 | End: 2023-08-19

## 2023-08-19 RX ORDER — LOPERAMIDE HYDROCHLORIDE 2 MG/1
2 CAPSULE ORAL
Status: DISCONTINUED | OUTPATIENT
Start: 2023-08-19 | End: 2023-08-22 | Stop reason: HOSPADM

## 2023-08-19 RX ORDER — ONDANSETRON 2 MG/ML
INJECTION INTRAMUSCULAR; INTRAVENOUS
Status: COMPLETED
Start: 2023-08-19 | End: 2023-08-19

## 2023-08-19 RX ORDER — BUPRENORPHINE AND NALOXONE 2; .5 MG/1; MG/1
1 FILM, SOLUBLE BUCCAL; SUBLINGUAL ONCE
Status: COMPLETED | OUTPATIENT
Start: 2023-08-19 | End: 2023-08-19

## 2023-08-19 RX ORDER — METRONIDAZOLE 500 MG/1
500 TABLET ORAL EVERY 8 HOURS
Status: DISCONTINUED | OUTPATIENT
Start: 2023-08-19 | End: 2023-08-20

## 2023-08-19 RX ORDER — CLONIDINE HYDROCHLORIDE 0.1 MG/1
0.1 TABLET ORAL EVERY 8 HOURS PRN
Status: DISCONTINUED | OUTPATIENT
Start: 2023-08-19 | End: 2023-08-22 | Stop reason: HOSPADM

## 2023-08-19 RX ADMIN — CIPROFLOXACIN 400 MG: 400 INJECTION, SOLUTION INTRAVENOUS at 06:08

## 2023-08-19 RX ADMIN — HEPARIN SODIUM 5000 UNITS: 5000 INJECTION INTRAVENOUS; SUBCUTANEOUS at 06:08

## 2023-08-19 RX ADMIN — METRONIDAZOLE 500 MG: 500 TABLET ORAL at 01:08

## 2023-08-19 RX ADMIN — BUPRENORPHINE AND NALOXONE 1 FILM: 2; .5 FILM BUCCAL; SUBLINGUAL at 02:08

## 2023-08-19 RX ADMIN — CLONIDINE HYDROCHLORIDE 0.1 MG: 0.1 TABLET ORAL at 02:08

## 2023-08-19 RX ADMIN — HEPARIN SODIUM 5000 UNITS: 5000 INJECTION INTRAVENOUS; SUBCUTANEOUS at 01:08

## 2023-08-19 RX ADMIN — HEPARIN SODIUM 5000 UNITS: 5000 INJECTION INTRAVENOUS; SUBCUTANEOUS at 09:08

## 2023-08-19 RX ADMIN — METHIMAZOLE 5 MG: 5 TABLET ORAL at 08:08

## 2023-08-19 RX ADMIN — Medication 10 ML: at 09:08

## 2023-08-19 RX ADMIN — OXYCODONE HYDROCHLORIDE 5 MG: 5 TABLET ORAL at 01:08

## 2023-08-19 RX ADMIN — METRONIDAZOLE 500 MG: 500 TABLET ORAL at 09:08

## 2023-08-19 RX ADMIN — Medication 10 ML: at 06:08

## 2023-08-19 RX ADMIN — AMLODIPINE BESYLATE 5 MG: 5 TABLET ORAL at 08:08

## 2023-08-19 RX ADMIN — SODIUM CHLORIDE: 9 INJECTION, SOLUTION INTRAVENOUS at 02:08

## 2023-08-19 RX ADMIN — SODIUM CHLORIDE 1000 ML: 0.9 INJECTION, SOLUTION INTRAVENOUS at 12:08

## 2023-08-19 RX ADMIN — HYDROMORPHONE HYDROCHLORIDE 0.2 MG: 0.5 INJECTION, SOLUTION INTRAMUSCULAR; INTRAVENOUS; SUBCUTANEOUS at 08:08

## 2023-08-19 RX ADMIN — CIPROFLOXACIN 400 MG: 400 INJECTION, SOLUTION INTRAVENOUS at 11:08

## 2023-08-19 RX ADMIN — AZITHROMYCIN MONOHYDRATE 500 MG: 500 INJECTION, POWDER, LYOPHILIZED, FOR SOLUTION INTRAVENOUS at 06:08

## 2023-08-19 RX ADMIN — ONDANSETRON HYDROCHLORIDE 4 MG: 2 INJECTION INTRAMUSCULAR; INTRAVENOUS at 07:08

## 2023-08-19 RX ADMIN — LOPERAMIDE HYDROCHLORIDE 2 MG: 2 CAPSULE ORAL at 02:08

## 2023-08-19 RX ADMIN — DICYCLOMINE HYDROCHLORIDE 10 MG: 10 CAPSULE ORAL at 09:08

## 2023-08-19 RX ADMIN — ONDANSETRON 4 MG: 2 INJECTION INTRAMUSCULAR; INTRAVENOUS at 07:08

## 2023-08-19 RX ADMIN — BUPRENORPHINE AND NALOXONE 1 FILM: 4; 1 FILM BUCCAL; SUBLINGUAL at 08:08

## 2023-08-19 RX ADMIN — DICYCLOMINE HYDROCHLORIDE 10 MG: 10 CAPSULE ORAL at 02:08

## 2023-08-19 RX ADMIN — Medication 10 ML: at 01:08

## 2023-08-19 RX ADMIN — PROMETHAZINE HYDROCHLORIDE 12.5 MG: 25 INJECTION INTRAMUSCULAR; INTRAVENOUS at 08:08

## 2023-08-19 NOTE — ASSESSMENT & PLAN NOTE
- Ms. Morgan Caro presents with 1 day of abdominal pain associated with N/V/D  - she attributes this to detoxing form Tramadol which she took her last dose of one week ago   - CT shows enteritis vs. Diarrhea illness vs. Low-grade SBO   - initiate antibiotics   - obtain stool culture   - NPO save for meds/sips with meds  - PRN pain and nausea meds

## 2023-08-19 NOTE — ASSESSMENT & PLAN NOTE
Patient uses street Tramadol and presented to [facility] for rehab from opioids.    -Tox Screen  -COWS q4 with prn Suboxone  -Symptomatic treatment as needed with Clonidine, Dicyclomine, Loperamide as needed

## 2023-08-19 NOTE — ED PROVIDER NOTES
"Encounter date: 8:33 PM 08/19/2023    Source of History   Patient    Chief Complaint   Pt presents with:   Abdominal Pain (Pt arrives via EMS from [facility] for generalized abd pain with N/V/D since yesterday after attempting to detox from Tramadol. Per patient, last dose of Tramadol 1 week ago. )      History Of Present Illness   Morgan Caro is a 43 y.o. female with PMHx of history of hypertension, electrolyte abnormalities, UTI, previous history of drug abuse who presents to the ED with abdominal pain starting today. The patient is currently staying at [facility] for Tramadol detox with last Tramadol taken one week ago. Today, she describes her pain as a generalized sharp "pressure" that radiates to her vagina.  She states that she is had profuse diarrhea without noted blood.  She notes that she is been nauseous and has had multiple bouts of nonbloody nonbilious emesis.  She notes accompanying chest pain, SOB, and diarrhea. Patient believes her symptoms may be related to going through withdrawal.    Denies:  Recent falls or trauma.  This is the extent to the patients complaints today here in the emergency department.  Past History     Review of patient's allergies indicates:   Allergen Reactions    Ibuprofen Hives and Edema       No current facility-administered medications on file prior to encounter.     Current Outpatient Medications on File Prior to Encounter   Medication Sig Dispense Refill    atenoloL (TENORMIN) 25 MG tablet Take 1 tablet (25 mg total) by mouth once daily. 180 tablet 3    EScitalopram oxalate (LEXAPRO) 5 MG Tab Take 1 tablet (5 mg total) by mouth once daily. 90 tablet 3    methIMAzole (TAPAZOLE) 10 MG Tab Take 1 tablet by mouth twice daily for 1 week, then reduce to 1 tablet by mouth once daily. Go to Endocrinologist for adjustments asap. 180 tablet 0    QUEtiapine (SEROQUEL) 100 MG Tab Take 1 tablet (100 mg total) by mouth every evening. 30 tablet 11    [DISCONTINUED] metoprolol " succinate (TOPROL-XL) 25 MG 24 hr tablet Take 1 tablet (25 mg total) by mouth once daily. 30 tablet 0       As per HPI and below:  Past Medical History:   Diagnosis Date    Depression     Migraines     Primary hypertension 03/04/2023    Thyroid disease      History reviewed. No pertinent surgical history.    Social History     Socioeconomic History    Marital status: Single   Tobacco Use    Smoking status: Every Day     Current packs/day: 2.00     Types: Cigarettes    Smokeless tobacco: Never    Tobacco comments:     1 pack of cigarettes will last the pt 2 days.   Substance and Sexual Activity    Alcohol use: Yes     Comment: two drinks every 3 days.    Drug use: Yes     Types: Marijuana     Comment: uses marijuasa daily     Sexual activity: Yes     Partners: Male     Social Determinants of Health     Financial Resource Strain: Medium Risk (6/15/2023)    Overall Financial Resource Strain (CARDIA)     Difficulty of Paying Living Expenses: Somewhat hard   Food Insecurity: No Food Insecurity (6/15/2023)    Hunger Vital Sign     Worried About Running Out of Food in the Last Year: Never true     Ran Out of Food in the Last Year: Never true   Transportation Needs: No Transportation Needs (6/15/2023)    PRAPARE - Transportation     Lack of Transportation (Medical): No     Lack of Transportation (Non-Medical): No   Physical Activity: Inactive (6/15/2023)    Exercise Vital Sign     Days of Exercise per Week: 0 days     Minutes of Exercise per Session: 0 min   Stress: Stress Concern Present (6/15/2023)    [employer] - Occupational Stress Questionnaire     Feeling of Stress : To some extent   Social Connections: Socially Isolated (6/15/2023)    Social Connection and Isolation Panel [NHANES]     Frequency of Communication with Friends and Family: Three times a week     Frequency of Social Gatherings with Friends and Family: Three times a week     Attends Rastafari Services: Never     Active Member  of Clubs or Organizations: No     Attends Club or Organization Meetings: Never     Marital Status: Never    Housing Stability: Low Risk  (6/15/2023)    Housing Stability Vital Sign     Unable to Pay for Housing in the Last Year: No     Number of Places Lived in the Last Year: 1     Unstable Housing in the Last Year: No       History reviewed. No pertinent family history.    Physical Exam     Vitals:    08/19/23 1450 08/19/23 1500 08/19/23 1553 08/19/23 1600   BP: (!) 158/110  (!) 139/98 130/75   BP Location:   Left arm    Patient Position:   Lying    Pulse: (!) 122  110 106   Resp: (!) 26  (!) 24 20   Temp:  98.7 °F (37.1 °C)     TempSrc:  Oral     SpO2: 99%  100%    Weight:       Height:         Physical Exam:   Nursing note and vitals reviewed.  Appearance: Well appearing female in no acute respiratory distress.  Making purposeful movements.  Speaking in full sentences.  Skin: No obvious rashes seen.  Good turgor.  No abrasions.  No ecchymoses.  Eyes: No conjunctival injection. EOMI, PERRL.  Head: NC/AT  ENT: Oropharynx clear.    Chest: CTAB. No increased work of breathing, bilateral chest rise.  Cardiovascular: Regular rate and rhythm.  Normal equal bilateral radial pulses.  Abdomen: Soft.  Diffusely tender.  Nontender.  No guarding.  No rebound. No Masses. No peritoneal signs.  Musculoskeletal: Good range of motion all joints.  No deformities.  Neck supple, full range of motion, no obvious deformity. No CVA tenderness.  Neurologic: Moves all extremities.  Normal sensation.  No facial droop.  Normal speech.    Mental Status:  Alert and oriented x 3.  Appropriate, conversant.      Results and Medications    Procedures    Labs Reviewed   CBC W/ AUTO DIFFERENTIAL - Abnormal; Notable for the following components:       Result Value    RBC 6.31 (*)     MCV 73 (*)     MCH 23.3 (*)     MCHC 31.9 (*)     RDW 16.7 (*)     All other components within normal limits   COMPREHENSIVE METABOLIC PANEL - Abnormal; Notable  for the following components:    CO2 19 (*)     Total Protein 8.6 (*)     Total Bilirubin 1.7 (*)     Alkaline Phosphatase 287 (*)     All other components within normal limits   URINALYSIS, REFLEX TO URINE CULTURE - Abnormal; Notable for the following components:    Specific Gravity, UA <=1.005 (*)     Protein, UA 1+ (*)     Ketones, UA 2+ (*)     Bilirubin (UA) 1+ (*)     Occult Blood UA 1+ (*)     All other components within normal limits    Narrative:     Specimen Source->Urine   TSH - Abnormal; Notable for the following components:    TSH <0.010 (*)     All other components within normal limits   T4, FREE - Abnormal; Notable for the following components:    Free T4 2.85 (*)     All other components within normal limits   URINALYSIS MICROSCOPIC - Abnormal; Notable for the following components:    RBC, UA 5 (*)     All other components within normal limits    Narrative:     Specimen Source->Urine   ISTAT CREATININE - Abnormal; Notable for the following components:    POC Creatinine 0.4 (*)     All other components within normal limits   LIPASE   TROPONIN I   T3   LACTIC ACID, PLASMA   POCT URINE PREGNANCY       Imaging Results              CT Abdomen Pelvis With Contrast (Final result)  Result time 08/18/23 23:49:46      Final result by Jose Luis Newman MD (08/18/23 23:49:46)                   Impression:      Mildly dilated fluid-filled loops of small bowel throughout the abdomen and pelvis without discrete transition point.  Liquid stool throughout the colon.  Findings may represent a nonspecific enteritis/diarrheal illness versus low-grade small bowel obstruction.  Clinical correlation advised.      Electronically signed by: Jose Luis Newman MD  Date:    08/18/2023  Time:    23:49               Narrative:    EXAMINATION:  CT ABDOMEN PELVIS WITH CONTRAST    CLINICAL HISTORY:  Abdominal abscess/infection suspected;    TECHNIQUE:  Low dose axial images, sagittal and coronal reformations were obtained from the  lung bases to the pubic symphysis following the IV administration of 75 mL of Omnipaque 350 .  Oral contrast was not given.    COMPARISON:  06/9/2023    FINDINGS:  There is a bandlike opacity within the right middle lobe suggesting platelike atelectasis or scarring.  There is no significant pleural fluid at the lung bases.  The visualized portions of the heart appear normal.    The liver is normal in size and attenuation with no focal hepatic abnormality.  Gallbladder is contracted limiting assessment.  There is no intra-or extrahepatic biliary ductal dilatation.    There is mild fluid-filled distention of the stomach.  The spleen, pancreas, and adrenal glands appear within normal limits.    Kidneys are normal in size and location and enhance symmetrically.  There is no evidence of hydronephrosis. The urinary bladder is unremarkable. Uterus appears within normal limits.  Low-attenuation structure within the left adnexa appears to represent filled bowel loops.  No significant free fluid in the pelvis.    The abdominal aorta is normal in course and caliber with mild atherosclerotic calcification along its course.  There is no retroperitoneal hematoma.    There are mildly dilated fluid-filled loops of small bowel throughout the abdomen and pelvis without definite transition point identified.  There is liquid stool throughout the colon.  No CT evidence of acute appendicitis.  There is no free intraperitoneal air, ascites, or portal venous gas.  There are scattered shotty mesenteric and retroperitoneal lymph nodes, similar to prior examination.    Osseous structures demonstrate mild degenerative changes of the spine.  The extraperitoneal soft tissues are unremarkable.                                       X-Ray Chest AP Portable (Final result)  Result time 08/18/23 21:56:16      Final result by Nalini Benitez MD (08/18/23 21:56:16)                   Impression:      No acute intrathoracic abnormality detected.   Mild cardiomegaly.      Electronically signed by: Nalini Benitez  Date:    08/18/2023  Time:    21:56               Narrative:    EXAMINATION:  AP PORTABLE CHEST    CLINICAL HISTORY:  Chest pain, unspecified    TECHNIQUE:  AP portable chest radiograph was submitted.    COMPARISON:  06/14/2023    FINDINGS:  AP portable chest radiograph demonstrates mild enlargement of the cardiac silhouette.  There is no focal consolidation, pneumothorax, or pleural effusion.                                          Medications   sodium chloride 0.9% flush 10 mL (10 mLs Intravenous Given 8/19/23 1302)   melatonin tablet 6 mg (has no administration in time range)   senna-docusate 8.6-50 mg per tablet 1 tablet (has no administration in time range)   acetaminophen tablet 650 mg (has no administration in time range)   naloxone 0.4 mg/mL injection 0.02 mg (has no administration in time range)   heparin (porcine) injection 5,000 Units (5,000 Units Subcutaneous Given 8/19/23 1301)   0.9%  NaCl infusion ( Intravenous Verify Only 8/19/23 1129)   ciprofloxacin (CIPRO)400mg/200ml D5W IVPB 400 mg (400 mg Intravenous Not Given 8/19/23 1452)   methIMAzole tablet 5 mg (5 mg Oral Not Given 8/19/23 1500)   metroNIDAZOLE tablet 500 mg (500 mg Oral Given 8/19/23 1302)   amLODIPine tablet 5 mg (5 mg Oral Given 8/19/23 0833)   ondansetron injection 4 mg (4 mg Intravenous Given 8/19/23 0715)   promethazine (PHENERGAN) 12.5 mg in dextrose 5 % (D5W) 50 mL IVPB (0 mg Intravenous Stopped 8/19/23 0854)   cloNIDine tablet 0.1 mg (0.1 mg Oral Given 8/19/23 1450)   loperamide capsule 2 mg (2 mg Oral Given 8/19/23 1450)   dicyclomine capsule 10 mg (10 mg Oral Given 8/19/23 1450)   buprenorphine-naloxone 2-0.5 mg SL film 1 Film (1 Film Sublingual Given 8/19/23 1451)     Followed by   buprenorphine-naloxone 4-1 mg SL film 1 Film (has no administration in time range)   sodium chloride 0.9% bolus 1,000 mL 1,000 mL (0 mLs Intravenous Stopped 8/18/23 2314)    acetaminophen tablet 1,000 mg (1,000 mg Oral Given 8/18/23 2213)   aluminum-magnesium hydroxide-simethicone 200-200-20 mg/5 mL suspension 5 mL (5 mLs Oral Given 8/18/23 2213)   dicyclomine capsule 20 mg (20 mg Oral Given 8/18/23 2214)   iohexoL (OMNIPAQUE 350) injection 75 mL (75 mLs Intravenous Given 8/18/23 2259)   sodium chloride 0.9% bolus 1,000 mL 1,000 mL (0 mLs Intravenous Stopped 8/19/23 0158)       MDM, Impression and Plan   Previous Records:   I decided to obtain old medical records which is listed here or in ED course:  On chart review patient has been seen for thyrotoxicosis and enteritis requiring inpatient admission.    Independently Interpreted Test(s):   EKG:  I independently reviewed and interpreted the EKG and my findings are as follows:   Detailed here or in ED course.   IMAGING:  I have ordered and independently interpreted X-rays and my findings are as follow:  Detailed here or in ED course.  Chest x-ray with no focal pneumonia pneumothorax or pleural effusion.  It does redemonstrate her known and noted cardiomegaly    Clinical Tests:   Lab Tests: Ordered and Reviewed  Radiological Study: Ordered and Reviewed  Medical Tests: Ordered and Reviewed    Differential diagnosis:  -UTI   -pregnancy   -pancreatitis   -NELSON  -infectious versus inflammatory diarrhea   -unlikely small-bowel obstruction based off presentation   -unlikely ACS but will risk stratify with EKG and troponin unlikely CHF based off of history, physical exam and chest x-ray      Initial Assessment & ED Management:    Urgent evaluation of 43 y.o. female with History as above notable for thyrotoxicosis, hypertension, substance use disorder who presents the ED with chief complaint of abdominal pain, diarrhea while detoxing at the [facility].  She states that she is had voluminous diarrhea associated nausea and vomiting.  On chart review she is noted to have recently been discharged from the hospital after having a syncopal event.   She was noted to have been discharged earlier this morning after reported syncopal episode.  Hospital discharge reviewed.  On arrival to the ED she is noted to be afebrile, tachycardic with a stable blood pressure in no acute respiratory distress.  Due concern for ACS EKG and troponin were obtained and not consistent with this diagnosis.  Her CMP was notable for a low CO2 thought to be due to dehydration.  She has no anion gap or history of diabetes pointing towards DKA.  Her hemoglobin is near baseline and she has no leukocytosis.  Her lipase was within normal limits making pancreatitis unlikely.  Her urinalysis is not consistent with a UTI.  Urine pregnancy negative.  While in the ED she had multiple bouts of diarrhea.  Urine tox was obtained and showed to be positive for marijuana.  She received multiple L of IV fluids and was noted to have initial lactic which was in normal limits.  Her abdominal pain was treated with Tylenol, Bentyl and Maalox with mild reduction in her symptoms yet she has ongoing diarrhea thus with her recent hospitalization stool cultures were sent and a CT of the abdomen pelvis was obtain and was concerning for nonspecific enteritis versus diarrhea verses a low-grade small bowel obstruction.  Her presentation does not seem to be consistent with a bowel obstruction and based off her CT physical exam presentation labs I believe is more consistent with enteritis versus diarrheal illness.  Due to concern for occult etiology of her symptoms and recent hospital admission blood cultures were drawn.  At this time I do not believe that her presentation is consistent with infectious diarrhea requiring IV antibiotics.  As she has baseline thyroid abnormalities a TSH was obtained and noted to be low and a T4 was high concerning for hypothyroidism and a T3 was added on.  This T3 is pending at time of admission.  As she remains tachycardic despite fluids with profuse diarrhea I believe she would benefit  from admission, observation, trending blood cultures and ongoing IV fluids.  Patient was updated on plans for admission.  I called and discussed case with Hospital Medicine who was agreeable with admission.  Patient deemed stable for admission to hospital medicine      I called and discussed the case with:  Hospital Medicine    Please see ED course for discussion of workup and dispo if not listed above.          Final diagnoses:  [R10.9] Abdominal pain  [R07.9] Chest pain  [K52.9] Colitis  [R19.7] Diarrhea, unspecified type (Primary)  [R00.0] Tachycardia  [E86.0] Dehydration  [E05.90] Hyperthyroidism        ED Disposition Condition    Observation Stable               ED Course as of 08/19/23 1723   Fri Aug 18, 2023   2058 ECHO On 6/15/2023 shows   · The left ventricle is normal in size with concentric hypertrophy and normal systolic function.  · The estimated ejection fraction is 60%.  · Normal left ventricular diastolic function.  · Mild right ventricular enlargement with normal right ventricular systolic function.  · Mild left atrial enlargement.  · Intermediate central venous pressure (8 mmHg).  · The estimated PA systolic pressure is 28 mmHg.      [HM]   2147 EKG shows sinus tachycardia with ventricular rate of 124 beats per minute.  Narrow QRS.  No ST segment elevations depressions no STEMI. [HM]      ED Course User Index  [HM] Lencho Zimmer MD         Scribe Attestation:   Scribe #1: I performed the above scribed service and the documentation accurately describes the services I performed. I attest to the accuracy of the note.    Physician Attestation for Scribe: I, Lencho Zimmer MD , reviewed documentation as scribed in my presence, which is both accurate and complete.         MD Goran Christensen Heyward B, MD  08/19/23 1723

## 2023-08-19 NOTE — H&P
[facility], [place], operated by [facility] Medicine  History & Physical    Patient Name: Morgan Caro  MRN: [id number]  Patient Class: OP- Observation  Admission Date: 8/18/2023  Attending Physician: Koko Morris MD   Primary Care Provider: [facility] -         Patient information was obtained from patient, past medical records and ER records.     Subjective:     Principal Problem:Abdominal pain    Chief Complaint:   Chief Complaint   Patient presents with    Abdominal Pain     Pt arrives via EMS from [facility] for generalized abd pain with N/V/D since yesterday after attempting to detox from Tramadol. Per patient, last dose of Tramadol 1 week ago.         HPI: Ms. Morgan Burgess is a 43 y.o. female with PMH of HTN, cardiomegaly, prior thyrotoxicosis with thyrotoxic crisis, who [place] [facility] [place]18[place]23 due to abdominal pain x 1 day. She describes the pain as a sharp pressure that radiates to her back and her vagina. She notes associated nausea, vomiting and diarrhea. She attributes this to detoxing from Tramadol, which she took her last dose of 1 week ago. She is currently staying at the [facility]. She denies chest pain, SOB, dyspnea. She was evaluted in the ED with labs showing no leukocytosis or left shift. A metabolic panel showed elevated ALP of 287, with Tbili of 1.7. A TSH was low at <0.010, and a Free T4 was 2.85. A UA was dilute with 1+ protein and 2+ ketones. A CT of the abdomen and pelvis showed mild dialtion of the small bowel without transition point, and liquid stool throughout the colon concerning for enteritis vs. Diarrhea illness vs. Low-grade Small bowel obstruction. She was treated in the ED with IV fluids, bentyl, GI cocktail, and tylenol She was placed on observation.       Past Medical History:   Diagnosis Date    Depression     Migraines     Primary hypertension 03/04/2023    Thyroid disease        History reviewed. No pertinent surgical history.    Review of  patient's allergies indicates:   Allergen Reactions    Ibuprofen Hives and Edema       No current facility-administered medications on file prior to encounter.     Current Outpatient Medications on File Prior to Encounter   Medication Sig    atenoloL (TENORMIN) 25 MG tablet Take 1 tablet (25 mg total) by mouth once daily.    EScitalopram oxalate (LEXAPRO) 5 MG Tab Take 1 tablet (5 mg total) by mouth once daily.    methIMAzole (TAPAZOLE) 10 MG Tab Take 1 tablet by mouth twice daily for 1 week, then reduce to 1 tablet by mouth once daily. Go to Endocrinologist for adjustments asap.    QUEtiapine (SEROQUEL) 100 MG Tab Take 1 tablet (100 mg total) by mouth every evening.    [DISCONTINUED] metoprolol succinate (TOPROL-XL) 25 MG 24 hr tablet Take 1 tablet (25 mg total) by mouth once daily.     Family History    None       Tobacco Use    Smoking status: Every Day     Current packs/day: 2.00     Types: Cigarettes    Smokeless tobacco: Never    Tobacco comments:     1 pack of cigarettes will last the pt 2 days.   Substance and Sexual Activity    Alcohol use: Yes     Comment: two drinks every 3 days.    Drug use: Yes     Types: Marijuana     Comment: uses marijuasa daily     Sexual activity: Yes     Partners: Male     Review of Systems   Constitutional:  Negative for chills, diaphoresis, fatigue and fever.   Respiratory:  Negative for cough, shortness of breath and wheezing.    Cardiovascular:  Negative for chest pain and palpitations.   Gastrointestinal:  Positive for abdominal pain, diarrhea, nausea and vomiting. Negative for abdominal distention and constipation.   Genitourinary:  Negative for dysuria, flank pain, frequency, hematuria and urgency.   Musculoskeletal:  Negative for arthralgias, back pain, myalgias, neck pain and neck stiffness.   Skin:  Negative for color change and pallor.   Neurological:  Negative for dizziness, syncope, weakness, light-headedness and headaches.   Psychiatric/Behavioral:   Negative for agitation and confusion.      Objective:     Vital Signs (Most Recent):  Temp: 99.6 °F (37.6 °C) (08/19/23 0301)  Pulse: (!) 113 (08/19/23 0301)  Resp: (!) 27 (08/19/23 0301)  BP: (!) 145/84 (08/19/23 0301)  SpO2: 97 % (08/19/23 0301) Vital Signs (24h Range):  Temp:  [97.9 °F (36.6 °C)-99.6 °F (37.6 °C)] 99.6 °F (37.6 °C)  Pulse:  [113-128] 113  Resp:  [17-48] 27  SpO2:  [97 %-99 %] 97 %  BP: (144-155)/(82-94) 145/84     Weight: 56.1 kg (123 lb 10.9 oz)  Body mass index is 22.62 kg/m².     Physical Exam  Vitals and nursing note reviewed.   Constitutional:       General: She is not in acute distress.     Appearance: Normal appearance. She is well-developed and normal weight. She is not ill-appearing, toxic-appearing or diaphoretic.   HENT:      Head: Normocephalic and atraumatic.   Eyes:      General: No scleral icterus.        Right eye: No discharge.         Left eye: No discharge.      Conjunctiva/sclera: Conjunctivae normal.   Neck:      Trachea: No tracheal deviation.   Cardiovascular:      Rate and Rhythm: Normal rate and regular rhythm.      Heart sounds: Normal heart sounds. No murmur heard.     No gallop.   Pulmonary:      Effort: Pulmonary effort is normal. No respiratory distress.      Breath sounds: Normal breath sounds. No stridor. No wheezing or rales.   Abdominal:      General: Bowel sounds are normal. There is no distension.      Palpations: Abdomen is soft. There is no mass.      Tenderness: There is no abdominal tenderness. There is no guarding.   Musculoskeletal:         General: No deformity. Normal range of motion.      Cervical back: Normal range of motion and neck supple.   Skin:     General: Skin is warm and dry.      Coloration: Skin is not pale.      Findings: No erythema or rash.   Neurological:      General: No focal deficit present.      Mental Status: She is alert and oriented to person, place, and time.      Cranial Nerves: No cranial nerve deficit.      Motor: No abnormal  "muscle tone.   Psychiatric:         Mood and Affect: Mood normal.         Behavior: Behavior normal.         Thought Content: Thought content normal.         Judgment: Judgment normal.              Significant Labs: All pertinent labs within the past 24 hours have been reviewed.  BMP:   Recent Labs   Lab 08/18/23 2116   GLU 83      K 4.3      CO2 19*   BUN 16   CREATININE 0.6   CALCIUM 10.4     CBC:   Recent Labs   Lab 08/18/23 2116   WBC 10.48   HGB 14.7   HCT 46.1        CMP:   Recent Labs   Lab 08/18/23 2116      K 4.3      CO2 19*   GLU 83   BUN 16   CREATININE 0.6   CALCIUM 10.4   PROT 8.6*   ALBUMIN 4.0   BILITOT 1.7*   ALKPHOS 287*   AST 37   ALT 43   ANIONGAP 16     Urine Culture: No results for input(s): "LABURIN" in the last 48 hours.  Urine Studies:   Recent Labs   Lab 08/19/23  0031   COLORU Yellow   APPEARANCEUA Clear   PHUR 7.0   SPECGRAV <=1.005*   PROTEINUA 1+*   GLUCUA Negative   KETONESU 2+*   BILIRUBINUA 1+*   OCCULTUA 1+*   NITRITE Negative   UROBILINOGEN Negative   LEUKOCYTESUR Negative   RBCUA 5*   WBCUA 2   BACTERIA None   SQUAMEPITHEL 3   HYALINECASTS 0       Significant Imaging: I have reviewed all pertinent imaging results/findings within the past 24 hours.  Imaging Results              CT Abdomen Pelvis With Contrast (Final result)  Result time 08/18/23 23:49:46      Final result by Jose Luis Newman MD (08/18/23 23:49:46)                   Impression:      Mildly dilated fluid-filled loops of small bowel throughout the abdomen and pelvis without discrete transition point.  Liquid stool throughout the colon.  Findings may represent a nonspecific enteritis/diarrheal illness versus low-grade small bowel obstruction.  Clinical correlation advised.      Electronically signed by: Jose Luis Newman MD  Date:    08/18/2023  Time:    23:49               Narrative:    EXAMINATION:  CT ABDOMEN PELVIS WITH CONTRAST    CLINICAL HISTORY:  Abdominal abscess/infection " suspected;    TECHNIQUE:  Low dose axial images, sagittal and coronal reformations were obtained from the lung bases to the pubic symphysis following the IV administration of 75 mL of Omnipaque 350 .  Oral contrast was not given.    COMPARISON:  06/9/2023    FINDINGS:  There is a bandlike opacity within the right middle lobe suggesting platelike atelectasis or scarring.  There is no significant pleural fluid at the lung bases.  The visualized portions of the heart appear normal.    The liver is normal in size and attenuation with no focal hepatic abnormality.  Gallbladder is contracted limiting assessment.  There is no intra-or extrahepatic biliary ductal dilatation.    There is mild fluid-filled distention of the stomach.  The spleen, pancreas, and adrenal glands appear within normal limits.    Kidneys are normal in size and location and enhance symmetrically.  There is no evidence of hydronephrosis. The urinary bladder is unremarkable. Uterus appears within normal limits.  Low-attenuation structure within the left adnexa appears to represent filled bowel loops.  No significant free fluid in the pelvis.    The abdominal aorta is normal in course and caliber with mild atherosclerotic calcification along its course.  There is no retroperitoneal hematoma.    There are mildly dilated fluid-filled loops of small bowel throughout the abdomen and pelvis without definite transition point identified.  There is liquid stool throughout the colon.  No CT evidence of acute appendicitis.  There is no free intraperitoneal air, ascites, or portal venous gas.  There are scattered shotty mesenteric and retroperitoneal lymph nodes, similar to prior examination.    Osseous structures demonstrate mild degenerative changes of the spine.  The extraperitoneal soft tissues are unremarkable.                                       X-Ray Chest AP Portable (Final result)  Result time 08/18/23 21:56:16      Final result by Nalini Benitez,  MD (08/18/23 21:56:16)                   Impression:      No acute intrathoracic abnormality detected.  Mild cardiomegaly.      Electronically signed by: Nalini Benitez  Date:    08/18/2023  Time:    21:56               Narrative:    EXAMINATION:  AP PORTABLE CHEST    CLINICAL HISTORY:  Chest pain, unspecified    TECHNIQUE:  AP portable chest radiograph was submitted.    COMPARISON:  06/14/2023    FINDINGS:  AP portable chest radiograph demonstrates mild enlargement of the cardiac silhouette.  There is no focal consolidation, pneumothorax, or pleural effusion.                                       Assessment/Plan:     * Abdominal pain  - Ms. Morgan Caro presents with 1 day of abdominal pain associated with N/V/D  - she attributes this to detoxing form Tramadol which she took her last dose of one week ago   - CT shows enteritis vs. Diarrhea illness vs. Low-grade SBO   - initiate antibiotics   - obtain stool culture   - NPO save for meds/sips with meds  - PRN pain and nausea meds     Primary hypertension  - chronic   - hypertensive at present   - home meds: atenolol 25 mg QD  - monitor       VTE Risk Mitigation (From admission, onward)         Ordered     heparin (porcine) injection 5,000 Units  Every 8 hours         08/19/23 0121     IP VTE HIGH RISK PATIENT  Once         08/19/23 0121     Place sequential compression device  Until discontinued         08/19/23 0121                     On 08/19/2023, patient should be placed in hospital observation services under the care of Dr. Koko Morris MD.      VICTORIANO AntoineC  Department of Hospital Medicine  [facility] Emergency Dept

## 2023-08-19 NOTE — ASSESSMENT & PLAN NOTE
Home meds: Atenolol 25 mg QD  -Hold b-blocker given ? Cocaine use in past  -Start Amlodipine 5mg bid  -Monitor and adjust as needed

## 2023-08-19 NOTE — ED TRIAGE NOTES
Abdominal Pain (Pt arrives via EMS from [facility] for generalized abd pain with N/V/D since yesterday after attempting to detox from Tramadol. Per patient, last dose of Tramadol 1 week ago. )

## 2023-08-19 NOTE — SUBJECTIVE & OBJECTIVE
Past Medical History:   Diagnosis Date    Depression     Migraines     Primary hypertension 03/04/2023    Thyroid disease        History reviewed. No pertinent surgical history.    Review of patient's allergies indicates:   Allergen Reactions    Ibuprofen Hives and Edema       No current facility-administered medications on file prior to encounter.     Current Outpatient Medications on File Prior to Encounter   Medication Sig    atenoloL (TENORMIN) 25 MG tablet Take 1 tablet (25 mg total) by mouth once daily.    EScitalopram oxalate (LEXAPRO) 5 MG Tab Take 1 tablet (5 mg total) by mouth once daily.    methIMAzole (TAPAZOLE) 10 MG Tab Take 1 tablet by mouth twice daily for 1 week, then reduce to 1 tablet by mouth once daily. Go to Endocrinologist for adjustments asap.    QUEtiapine (SEROQUEL) 100 MG Tab Take 1 tablet (100 mg total) by mouth every evening.    [DISCONTINUED] metoprolol succinate (TOPROL-XL) 25 MG 24 hr tablet Take 1 tablet (25 mg total) by mouth once daily.     Family History    None       Tobacco Use    Smoking status: Every Day     Current packs/day: 2.00     Types: Cigarettes    Smokeless tobacco: Never    Tobacco comments:     1 pack of cigarettes will last the pt 2 days.   Substance and Sexual Activity    Alcohol use: Yes     Comment: two drinks every 3 days.    Drug use: Yes     Types: Marijuana     Comment: uses marijuasa daily     Sexual activity: Yes     Partners: Male     Review of Systems   Constitutional:  Negative for chills, diaphoresis, fatigue and fever.   Respiratory:  Negative for cough, shortness of breath and wheezing.    Cardiovascular:  Negative for chest pain and palpitations.   Gastrointestinal:  Positive for abdominal pain, diarrhea, nausea and vomiting. Negative for abdominal distention and constipation.   Genitourinary:  Negative for dysuria, flank pain, frequency, hematuria and urgency.   Musculoskeletal:  Negative for arthralgias, back pain, myalgias, neck pain and neck  stiffness.   Skin:  Negative for color change and pallor.   Neurological:  Negative for dizziness, syncope, weakness, light-headedness and headaches.   Psychiatric/Behavioral:  Negative for agitation and confusion.      Objective:     Vital Signs (Most Recent):  Temp: 99.6 °F (37.6 °C) (08/19/23 0301)  Pulse: (!) 113 (08/19/23 0301)  Resp: (!) 27 (08/19/23 0301)  BP: (!) 145/84 (08/19/23 0301)  SpO2: 97 % (08/19/23 0301) Vital Signs (24h Range):  Temp:  [97.9 °F (36.6 °C)-99.6 °F (37.6 °C)] 99.6 °F (37.6 °C)  Pulse:  [113-128] 113  Resp:  [17-48] 27  SpO2:  [97 %-99 %] 97 %  BP: (144-155)/(82-94) 145/84     Weight: 56.1 kg (123 lb 10.9 oz)  Body mass index is 22.62 kg/m².     Physical Exam  Vitals and nursing note reviewed.   Constitutional:       General: She is not in acute distress.     Appearance: Normal appearance. She is well-developed and normal weight. She is not ill-appearing, toxic-appearing or diaphoretic.   HENT:      Head: Normocephalic and atraumatic.   Eyes:      General: No scleral icterus.        Right eye: No discharge.         Left eye: No discharge.      Conjunctiva/sclera: Conjunctivae normal.   Neck:      Trachea: No tracheal deviation.   Cardiovascular:      Rate and Rhythm: Normal rate and regular rhythm.      Heart sounds: Normal heart sounds. No murmur heard.     No gallop.   Pulmonary:      Effort: Pulmonary effort is normal. No respiratory distress.      Breath sounds: Normal breath sounds. No stridor. No wheezing or rales.   Abdominal:      General: Bowel sounds are normal. There is no distension.      Palpations: Abdomen is soft. There is no mass.      Tenderness: There is no abdominal tenderness. There is no guarding.   Musculoskeletal:         General: No deformity. Normal range of motion.      Cervical back: Normal range of motion and neck supple.   Skin:     General: Skin is warm and dry.      Coloration: Skin is not pale.      Findings: No erythema or rash.   Neurological:       "General: No focal deficit present.      Mental Status: She is alert and oriented to person, place, and time.      Cranial Nerves: No cranial nerve deficit.      Motor: No abnormal muscle tone.   Psychiatric:         Mood and Affect: Mood normal.         Behavior: Behavior normal.         Thought Content: Thought content normal.         Judgment: Judgment normal.              Significant Labs: All pertinent labs within the past 24 hours have been reviewed.  BMP:   Recent Labs   Lab 08/18/23 2116   GLU 83      K 4.3      CO2 19*   BUN 16   CREATININE 0.6   CALCIUM 10.4     CBC:   Recent Labs   Lab 08/18/23 2116   WBC 10.48   HGB 14.7   HCT 46.1        CMP:   Recent Labs   Lab 08/18/23 2116      K 4.3      CO2 19*   GLU 83   BUN 16   CREATININE 0.6   CALCIUM 10.4   PROT 8.6*   ALBUMIN 4.0   BILITOT 1.7*   ALKPHOS 287*   AST 37   ALT 43   ANIONGAP 16     Urine Culture: No results for input(s): "LABURIN" in the last 48 hours.  Urine Studies:   Recent Labs   Lab 08/19/23  0031   COLORU Yellow   APPEARANCEUA Clear   PHUR 7.0   SPECGRAV <=1.005*   PROTEINUA 1+*   GLUCUA Negative   KETONESU 2+*   BILIRUBINUA 1+*   OCCULTUA 1+*   NITRITE Negative   UROBILINOGEN Negative   LEUKOCYTESUR Negative   RBCUA 5*   WBCUA 2   BACTERIA None   SQUAMEPITHEL 3   HYALINECASTS 0       Significant Imaging: I have reviewed all pertinent imaging results/findings within the past 24 hours.  Imaging Results              CT Abdomen Pelvis With Contrast (Final result)  Result time 08/18/23 23:49:46      Final result by Jose Luis Newman MD (08/18/23 23:49:46)                   Impression:      Mildly dilated fluid-filled loops of small bowel throughout the abdomen and pelvis without discrete transition point.  Liquid stool throughout the colon.  Findings may represent a nonspecific enteritis/diarrheal illness versus low-grade small bowel obstruction.  Clinical correlation advised.      Electronically signed " by: Jose Luis Newman MD  Date:    08/18/2023  Time:    23:49               Narrative:    EXAMINATION:  CT ABDOMEN PELVIS WITH CONTRAST    CLINICAL HISTORY:  Abdominal abscess/infection suspected;    TECHNIQUE:  Low dose axial images, sagittal and coronal reformations were obtained from the lung bases to the pubic symphysis following the IV administration of 75 mL of Omnipaque 350 .  Oral contrast was not given.    COMPARISON:  06/9/2023    FINDINGS:  There is a bandlike opacity within the right middle lobe suggesting platelike atelectasis or scarring.  There is no significant pleural fluid at the lung bases.  The visualized portions of the heart appear normal.    The liver is normal in size and attenuation with no focal hepatic abnormality.  Gallbladder is contracted limiting assessment.  There is no intra-or extrahepatic biliary ductal dilatation.    There is mild fluid-filled distention of the stomach.  The spleen, pancreas, and adrenal glands appear within normal limits.    Kidneys are normal in size and location and enhance symmetrically.  There is no evidence of hydronephrosis. The urinary bladder is unremarkable. Uterus appears within normal limits.  Low-attenuation structure within the left adnexa appears to represent filled bowel loops.  No significant free fluid in the pelvis.    The abdominal aorta is normal in course and caliber with mild atherosclerotic calcification along its course.  There is no retroperitoneal hematoma.    There are mildly dilated fluid-filled loops of small bowel throughout the abdomen and pelvis without definite transition point identified.  There is liquid stool throughout the colon.  No CT evidence of acute appendicitis.  There is no free intraperitoneal air, ascites, or portal venous gas.  There are scattered shotty mesenteric and retroperitoneal lymph nodes, similar to prior examination.    Osseous structures demonstrate mild degenerative changes of the spine.  The  extraperitoneal soft tissues are unremarkable.                                       X-Ray Chest AP Portable (Final result)  Result time 08/18/23 21:56:16      Final result by Nalini Benitez MD (08/18/23 21:56:16)                   Impression:      No acute intrathoracic abnormality detected.  Mild cardiomegaly.      Electronically signed by: Nalini Benitez  Date:    08/18/2023  Time:    21:56               Narrative:    EXAMINATION:  AP PORTABLE CHEST    CLINICAL HISTORY:  Chest pain, unspecified    TECHNIQUE:  AP portable chest radiograph was submitted.    COMPARISON:  06/14/2023    FINDINGS:  AP portable chest radiograph demonstrates mild enlargement of the cardiac silhouette.  There is no focal consolidation, pneumothorax, or pleural effusion.

## 2023-08-19 NOTE — PROGRESS NOTES
[facility] Intensive Care [facility] Medicine  Progress Note    Patient Name: Morgan Caro  MRN: [id number]  Patient Class: OP- Observation   Admission Date: 8/18/2023  Length of Stay: 0 days  Attending Physician: Koko Morris MD  Primary Care Provider: [facility] -        Subjective:     Principal Problem:Abdominal pain      HPI:  Ms. Morgan Burgess is a 43 y.o. female with PMH of HTN, cardiomegaly, prior thyrotoxicosis with thyrotoxic crisis, who [place] [facility] [place] due to abdominal pain x 1 day. She describes the pain as a sharp pressure that radiates to her back and her vagina. She notes associated nausea, vomiting and diarrhea. She attributes this to detoxing from Tramadol, which she took her last dose of 1 week ago. She is currently staying at the [facility]. She denies chest pain, SOB, dyspnea. She was evaluted in the ED with labs showing no leukocytosis or left shift. A metabolic panel showed elevated ALP of 287, with Tbili of 1.7. A TSH was low at <0.010, and a Free T4 was 2.85. A UA was dilute with 1+ protein and 2+ ketones. A CT of the abdomen and pelvis showed mild dialtion of the small bowel without transition point, and liquid stool throughout the colon concerning for enteritis vs. Diarrhea illness vs. Low-grade Small bowel obstruction. She was treated in the ED with IV fluids, bentyl, GI cocktail, and tylenol She was placed on observation.       Overview/Hospital Course:  See below      Interval History: Patient is awake and alert this morning.  Nausea improving and no further emesis.  No chest pain or SOB.  Continued loose stools and abdominal discomfort.  No F/c.      Review of Systems   Constitutional:  Negative for chills, diaphoresis, fatigue and fever.   HENT:  Negative for ear pain.    Eyes:  Negative for pain.   Respiratory:  Negative for cough, shortness of breath and wheezing.    Cardiovascular:  Negative for chest pain and palpitations.    Gastrointestinal:  Positive for abdominal pain (improved), diarrhea (Loose Stools) and nausea. Negative for abdominal distention, constipation and vomiting.   Genitourinary:  Negative for dysuria, flank pain, frequency, hematuria and urgency.   Musculoskeletal:  Negative for arthralgias, back pain, myalgias, neck pain and neck stiffness.   Skin:  Negative for color change and pallor.   Neurological:  Negative for dizziness, syncope, weakness, light-headedness and headaches.   Psychiatric/Behavioral:  Negative for agitation and confusion.      Objective:     Vital Signs (Most Recent):  Temp: 99.3 °F (37.4 °C) (08/19/23 1115)  Pulse: (!) 115 (08/19/23 1302)  Resp: 20 (08/19/23 1302)  BP: 128/84 (08/19/23 1200)  SpO2: 98 % (08/19/23 1300) Vital Signs (24h Range):  Temp:  [97.9 °F (36.6 °C)-99.6 °F (37.6 °C)] 99.3 °F (37.4 °C)  Pulse:  [112-128] 115  Resp:  [17-48] 20  SpO2:  [96 %-100 %] 98 %  BP: (128-168)/(82-99) 128/84     Weight: 56.1 kg (123 lb 10.9 oz)  Body mass index is 22.62 kg/m².    Intake/Output Summary (Last 24 hours) at 8/19/2023 1353  Last data filed at 8/19/2023 1333  Gross per 24 hour   Intake 4174 ml   Output 890 ml   Net 3284 ml         Physical Exam  Vitals and nursing note reviewed.   Constitutional:       General: She is not in acute distress.     Appearance: Normal appearance. She is well-developed and normal weight. She is ill-appearing (mild). She is not toxic-appearing.   HENT:      Head: Normocephalic and atraumatic.      Right Ear: External ear normal.      Left Ear: External ear normal.      Nose: Nose normal.      Mouth/Throat:      Mouth: Mucous membranes are moist.      Pharynx: Oropharynx is clear.   Eyes:      General: No scleral icterus.     Conjunctiva/sclera: Conjunctivae normal.   Neck:      Trachea: No tracheal deviation.   Cardiovascular:      Rate and Rhythm: Regular rhythm. Tachycardia present.      Heart sounds: Normal heart sounds. No murmur heard.     No gallop.    Pulmonary:      Effort: Pulmonary effort is normal. No respiratory distress.      Breath sounds: Normal breath sounds. No wheezing or rales.   Abdominal:      General: Bowel sounds are normal. There is no distension.      Palpations: Abdomen is soft.      Tenderness: There is abdominal tenderness (mild diffuse). There is no right CVA tenderness, left CVA tenderness or guarding.   Musculoskeletal:         General: No deformity. Normal range of motion.      Cervical back: Normal range of motion and neck supple. No rigidity.   Skin:     General: Skin is warm and dry.      Coloration: Skin is not pale.      Findings: No erythema or rash.   Neurological:      General: No focal deficit present.      Mental Status: She is alert and oriented to person, place, and time. Mental status is at baseline.      Cranial Nerves: No cranial nerve deficit.      Motor: No weakness or abnormal muscle tone.   Psychiatric:         Mood and Affect: Mood normal.         Behavior: Behavior normal.             Significant Labs: All pertinent labs within the past 24 hours have been reviewed.    Significant Imaging: I have reviewed all pertinent imaging results/findings within the past 24 hours.      Assessment/Plan:      * Abdominal pain  Viral Gastroenteritis +/- Opioid Withdrawal +/- Hyperthyroidism.  Patient presented with 1 day of N/V/D.  Patient arrived to [facility] on 8/18/2023 for opioid dependence treatment.  While there, developed N/V/D and referred to ED.  Patient has been using street Tramadol, but she thinks it was something else.  Reported last use of Tramadol was a week PTA.  CT A/P on admission with mildly dilated fluid-filled loops of small bowel throughout the abdomen and pelvis without discrete transition point. Liquid stool throughout the colon.  Findings may represent a nonspecific enteritis/diarrheal illness versus low-grade small bowel obstruction.  Labs on admission with normal WBC with no LS, Lipase negative, UA  unremarkable, TSH <0.010 and fT4 2.85.  She was treated with NS x 2L and started on antibiotics to cover possible enteritis.    Plan:  Continue Cipro/Metronidazole for now  Follow up Stool Studies  ADAT  Monitor with withdrawal symptoms with COWS - prn Suboxone  Resume Methimazole for Hyperthyroidism  Anti-emetics prn    Opioid abuse  Patient uses street Tramadol and presented to [facility] for rehab from opioids.    -Tox Screen  -COWS q4 with prn Suboxone  -Symptomatic treatment as needed with Clonidine, Dicyclomine, Loperamide as needed      Hyperthyroidism  Poor adherence with Methimazole.  TSH suppressed at <0.010 and fT4 of 2.85  -Start Methimazole 5mg tid  -Follow up with Endocrine on discharge      Primary hypertension  Home meds: Atenolol 25 mg QD  -Hold b-blocker given ? Cocaine use in past  -Start Amlodipine 5mg bid  -Monitor and adjust as needed        VTE Risk Mitigation (From admission, onward)         Ordered     heparin (porcine) injection 5,000 Units  Every 8 hours         08/19/23 0121     IP VTE HIGH RISK PATIENT  Once         08/19/23 0121     Place sequential compression device  Until discontinued         08/19/23 0121                Discharge Planning   FLORY:      Code Status: Full Code   Is the patient medically ready for discharge?:     Reason for patient still in hospital (select all that apply): Patient trending condition, Treatment and Consult recommendations  Discharge Plan A: Home with family                  Koko Morris MD  Department of Hospital Medicine   [facility] - Intensive Care ([place])

## 2023-08-19 NOTE — NURSING
Nurses Note -- 4 Eyes      8/19/2023   2:30 AM      Skin assessed during: Admit      [x] No Altered Skin Integrity Present    [x]Prevention Measures Documented      [] Yes- Altered Skin Integrity Present or Discovered   [] LDA Added if Not in Epic (Describe Wound)   [] New Altered Skin Integrity was Present on Admit and Documented in LDA   [] Wound Image Taken    Wound Care Consulted? No    Attending Nurse:  Amy Avalos RN    Second RN/Staff Member:  Saniya Baptiste RN

## 2023-08-19 NOTE — HPI
Ms. Morgan Burgess is a 43 y.o. female with PMH of HTN, cardiomegaly, prior thyrotoxicosis with thyrotoxic crisis, who [place] [facility] [place] due to abdominal pain x 1 day. She describes the pain as a sharp pressure that radiates to her back and her vagina. She notes associated nausea, vomiting and diarrhea. She attributes this to detoxing from Tramadol, which she took her last dose of 1 week ago. She is currently staying at the [facility]. She denies chest pain, SOB, dyspnea. She was evaluted in the ED with labs showing no leukocytosis or left shift. A metabolic panel showed elevated ALP of 287, with Tbili of 1.7. A TSH was low at <0.010, and a Free T4 was 2.85. A UA was dilute with 1+ protein and 2+ ketones. A CT of the abdomen and pelvis showed mild dialtion of the small bowel without transition point, and liquid stool throughout the colon concerning for enteritis vs. Diarrhea illness vs. Low-grade Small bowel obstruction. She was treated in the ED with IV fluids, bentyl, GI cocktail, and tylenol She was placed on observation.

## 2023-08-19 NOTE — HOSPITAL COURSE
Patient requested pain medication for tooth pain on 8/20/2023 and prescribed Gabapentin and Tylenol, in addition to benzocaine spray.  As discussed with patient, no opioids were to be prescribed.  Patient left AMA in the evening of 8/20/2023.

## 2023-08-19 NOTE — ASSESSMENT & PLAN NOTE
Poor adherence with Methimazole.  TSH suppressed at <0.010 and fT4 of 2.85  -Start Methimazole 5mg tid  -Follow up with Endocrine on discharge

## 2023-08-19 NOTE — PLAN OF CARE
[facility] - Intensive Care ([place])  Initial Discharge Assessment       Primary Care Provider: [facility] -    Admission Diagnosis: Colitis [K52.9]  Abdominal pain [R10.9]  Chest pain [R07.9]    Admission Date: 8/18/2023  Expected Discharge Date:     Transition of Care Barriers: Transportation    Payor: MEDICAID / Plan: [facility] COMMUNITY PLAN [facility] ([place] MEDICAID) / Product Type: Managed Medicaid /     Extended Emergency Contact Information  Primary Emergency Contact: GREG DUQUE  Mobile Phone: [phone]  Relation: Daughter  Preferred language: English   needed? No  Secondary Emergency Contact: Author Gordo  Mobile Phone: [phone]  Relation: Son    Discharge Plan A: Home with family         [id number] - [place], [place]  [address]  [address]  [place] [address]  Phone: [phone] Fax: [phone]      Initial Assessment (most recent)       Adult Discharge Assessment - 08/19/23 1344          Discharge Assessment    Assessment Type Discharge Planning Assessment     Confirmed/corrected address, phone number and insurance Yes     Confirmed Demographics --   corrected in EPIC    Source of Information patient     Does patient/caregiver understand observation status Yes     People in Home parent(s)     Do you expect to return to your current living situation? Yes     Do you have help at home or someone to help you manage your care at home? Yes     Prior to hospitilization cognitive status: Alert/Oriented     Current cognitive status: Alert/Oriented     Equipment Currently Used at Home none     Patient currently being followed by outpatient case management? No     Do you currently have service(s) that help you manage your care at home? No     Do you take prescription medications? Yes     Do you have prescription coverage? Yes     Do you have any problems affording any of your prescribed  medications? No     Is the patient taking medications as prescribed? yes     Who is going to help you get home at discharge? needs ride     How do you get to doctors appointments? family or friend will provide     Are you on dialysis? No     DME Needed Upon Discharge  none     Discharge Plan discussed with: Patient     Transition of Care Barriers Transportation     Discharge Plan A Home with family

## 2023-08-19 NOTE — ASSESSMENT & PLAN NOTE
Viral Gastroenteritis +/- Opioid Withdrawal +/- Hyperthyroidism.  Patient presented with 1 day of N/V/D.  Patient arrived to [facility] on 8/18/2023 for opioid dependence treatment.  While there, developed N/V/D and referred to ED.  Patient has been using street Tramadol, but she thinks it was something else.  Reported last use of Tramadol was a week PTA.  CT A/P on admission with mildly dilated fluid-filled loops of small bowel throughout the abdomen and pelvis without discrete transition point. Liquid stool throughout the colon.  Findings may represent a nonspecific enteritis/diarrheal illness versus low-grade small bowel obstruction.  Labs on admission with normal WBC with no LS, Lipase negative, UA unremarkable, TSH <0.010 and fT4 2.85.  She was treated with NS x 2L and started on antibiotics to cover possible enteritis.    Plan:  Continue Cipro/Metronidazole for now  Follow up Stool Studies  ADAT  Monitor with withdrawal symptoms with COWS - prn Suboxone  Resume Methimazole for Hyperthyroidism  Anti-emetics prn

## 2023-08-19 NOTE — SUBJECTIVE & OBJECTIVE
Interval History: Patient is awake and alert this morning.  Nausea improving and no further emesis.  No chest pain or SOB.  Continued loose stools and abdominal discomfort.  No F/c.      Review of Systems   Constitutional:  Negative for chills, diaphoresis, fatigue and fever.   HENT:  Negative for ear pain.    Eyes:  Negative for pain.   Respiratory:  Negative for cough, shortness of breath and wheezing.    Cardiovascular:  Negative for chest pain and palpitations.   Gastrointestinal:  Positive for abdominal pain (improved), diarrhea (Loose Stools) and nausea. Negative for abdominal distention, constipation and vomiting.   Genitourinary:  Negative for dysuria, flank pain, frequency, hematuria and urgency.   Musculoskeletal:  Negative for arthralgias, back pain, myalgias, neck pain and neck stiffness.   Skin:  Negative for color change and pallor.   Neurological:  Negative for dizziness, syncope, weakness, light-headedness and headaches.   Psychiatric/Behavioral:  Negative for agitation and confusion.      Objective:     Vital Signs (Most Recent):  Temp: 99.3 °F (37.4 °C) (08/19/23 1115)  Pulse: (!) 115 (08/19/23 1302)  Resp: 20 (08/19/23 1302)  BP: 128/84 (08/19/23 1200)  SpO2: 98 % (08/19/23 1300) Vital Signs (24h Range):  Temp:  [97.9 °F (36.6 °C)-99.6 °F (37.6 °C)] 99.3 °F (37.4 °C)  Pulse:  [112-128] 115  Resp:  [17-48] 20  SpO2:  [96 %-100 %] 98 %  BP: (128-168)/(82-99) 128/84     Weight: 56.1 kg (123 lb 10.9 oz)  Body mass index is 22.62 kg/m².    Intake/Output Summary (Last 24 hours) at 8/19/2023 1353  Last data filed at 8/19/2023 1333  Gross per 24 hour   Intake 4174 ml   Output 890 ml   Net 3284 ml         Physical Exam  Vitals and nursing note reviewed.   Constitutional:       General: She is not in acute distress.     Appearance: Normal appearance. She is well-developed and normal weight. She is ill-appearing (mild). She is not toxic-appearing.   HENT:      Head: Normocephalic and atraumatic.      Right  Ear: External ear normal.      Left Ear: External ear normal.      Nose: Nose normal.      Mouth/Throat:      Mouth: Mucous membranes are moist.      Pharynx: Oropharynx is clear.   Eyes:      General: No scleral icterus.     Conjunctiva/sclera: Conjunctivae normal.   Neck:      Trachea: No tracheal deviation.   Cardiovascular:      Rate and Rhythm: Regular rhythm. Tachycardia present.      Heart sounds: Normal heart sounds. No murmur heard.     No gallop.   Pulmonary:      Effort: Pulmonary effort is normal. No respiratory distress.      Breath sounds: Normal breath sounds. No wheezing or rales.   Abdominal:      General: Bowel sounds are normal. There is no distension.      Palpations: Abdomen is soft.      Tenderness: There is abdominal tenderness (mild diffuse). There is no right CVA tenderness, left CVA tenderness or guarding.   Musculoskeletal:         General: No deformity. Normal range of motion.      Cervical back: Normal range of motion and neck supple. No rigidity.   Skin:     General: Skin is warm and dry.      Coloration: Skin is not pale.      Findings: No erythema or rash.   Neurological:      General: No focal deficit present.      Mental Status: She is alert and oriented to person, place, and time. Mental status is at baseline.      Cranial Nerves: No cranial nerve deficit.      Motor: No weakness or abnormal muscle tone.   Psychiatric:         Mood and Affect: Mood normal.         Behavior: Behavior normal.             Significant Labs: All pertinent labs within the past 24 hours have been reviewed.    Significant Imaging: I have reviewed all pertinent imaging results/findings within the past 24 hours.

## 2023-08-20 VITALS
WEIGHT: 123.69 LBS | OXYGEN SATURATION: 97 % | HEIGHT: 62 IN | RESPIRATION RATE: 20 BRPM | BODY MASS INDEX: 22.76 KG/M2 | HEART RATE: 130 BPM | DIASTOLIC BLOOD PRESSURE: 99 MMHG | TEMPERATURE: 98 F | SYSTOLIC BLOOD PRESSURE: 170 MMHG

## 2023-08-20 LAB
ANION GAP SERPL CALC-SCNC: 8 MMOL/L (ref 8–16)
BASOPHILS # BLD AUTO: 0.02 K/UL (ref 0–0.2)
BASOPHILS NFR BLD: 0.3 % (ref 0–1.9)
BUN SERPL-MCNC: 8 MG/DL (ref 6–20)
C DIFF GDH STL QL: NEGATIVE
C DIFF TOX A+B STL QL IA: NEGATIVE
CALCIUM SERPL-MCNC: 9.1 MG/DL (ref 8.7–10.5)
CHLORIDE SERPL-SCNC: 108 MMOL/L (ref 95–110)
CO2 SERPL-SCNC: 21 MMOL/L (ref 23–29)
CREAT SERPL-MCNC: 0.6 MG/DL (ref 0.5–1.4)
CRYPTOSP AG STL QL IA: NEGATIVE
DIFFERENTIAL METHOD: ABNORMAL
EOSINOPHIL # BLD AUTO: 0.1 K/UL (ref 0–0.5)
EOSINOPHIL NFR BLD: 1.3 % (ref 0–8)
ERYTHROCYTE [DISTWIDTH] IN BLOOD BY AUTOMATED COUNT: 15.4 % (ref 11.5–14.5)
EST. GFR  (NO RACE VARIABLE): >60 ML/MIN/1.73 M^2
FERRITIN SERPL-MCNC: 90 NG/ML (ref 20–300)
G LAMBLIA AG STL QL IA: NEGATIVE
GLUCOSE SERPL-MCNC: 134 MG/DL (ref 70–110)
HCT VFR BLD AUTO: 37.3 % (ref 37–48.5)
HGB BLD-MCNC: 11.7 G/DL (ref 12–16)
IMM GRANULOCYTES # BLD AUTO: 0.01 K/UL (ref 0–0.04)
IMM GRANULOCYTES NFR BLD AUTO: 0.1 % (ref 0–0.5)
IRON SERPL-MCNC: 54 UG/DL (ref 30–160)
LYMPHOCYTES # BLD AUTO: 2.6 K/UL (ref 1–4.8)
LYMPHOCYTES NFR BLD: 37.7 % (ref 18–48)
MAGNESIUM SERPL-MCNC: 1.6 MG/DL (ref 1.6–2.6)
MCH RBC QN AUTO: 23.5 PG (ref 27–31)
MCHC RBC AUTO-ENTMCNC: 31.4 G/DL (ref 32–36)
MCV RBC AUTO: 75 FL (ref 82–98)
MONOCYTES # BLD AUTO: 0.9 K/UL (ref 0.3–1)
MONOCYTES NFR BLD: 12.8 % (ref 4–15)
MRSA ID BY PCR: NEGATIVE
NEUTROPHILS # BLD AUTO: 3.3 K/UL (ref 1.8–7.7)
NEUTROPHILS NFR BLD: 47.8 % (ref 38–73)
NRBC BLD-RTO: 0 /100 WBC
OB PNL STL: NEGATIVE
PLATELET # BLD AUTO: 217 K/UL (ref 150–450)
PMV BLD AUTO: 9.7 FL (ref 9.2–12.9)
POTASSIUM SERPL-SCNC: 3.6 MMOL/L (ref 3.5–5.1)
RBC # BLD AUTO: 4.97 M/UL (ref 4–5.4)
RV AG STL QL IA.RAPID: NEGATIVE
SATURATED IRON: 16 % (ref 20–50)
SODIUM SERPL-SCNC: 137 MMOL/L (ref 136–145)
STAPH AUREUS ID BY PCR: NEGATIVE
TOTAL IRON BINDING CAPACITY: 340 UG/DL (ref 250–450)
TRANSFERRIN SERPL-MCNC: 230 MG/DL (ref 200–375)
WBC # BLD AUTO: 6.81 K/UL (ref 3.9–12.7)
WBC #/AREA STL HPF: NORMAL /[HPF]

## 2023-08-20 PROCEDURE — 83540 ASSAY OF IRON: CPT | Performed by: PHYSICIAN ASSISTANT

## 2023-08-20 PROCEDURE — 85025 COMPLETE CBC W/AUTO DIFF WBC: CPT | Performed by: PHYSICIAN ASSISTANT

## 2023-08-20 PROCEDURE — 83735 ASSAY OF MAGNESIUM: CPT | Performed by: PHYSICIAN ASSISTANT

## 2023-08-20 PROCEDURE — 63600175 PHARM REV CODE 636 W HCPCS: Performed by: PHYSICIAN ASSISTANT

## 2023-08-20 PROCEDURE — 82728 ASSAY OF FERRITIN: CPT | Performed by: PHYSICIAN ASSISTANT

## 2023-08-20 PROCEDURE — 25000003 PHARM REV CODE 250: Performed by: INTERNAL MEDICINE

## 2023-08-20 PROCEDURE — 63600175 PHARM REV CODE 636 W HCPCS: Performed by: INTERNAL MEDICINE

## 2023-08-20 PROCEDURE — 36415 COLL VENOUS BLD VENIPUNCTURE: CPT | Performed by: PHYSICIAN ASSISTANT

## 2023-08-20 PROCEDURE — 84466 ASSAY OF TRANSFERRIN: CPT | Performed by: PHYSICIAN ASSISTANT

## 2023-08-20 PROCEDURE — A4216 STERILE WATER/SALINE, 10 ML: HCPCS | Performed by: PHYSICIAN ASSISTANT

## 2023-08-20 PROCEDURE — 25000003 PHARM REV CODE 250: Performed by: PHYSICIAN ASSISTANT

## 2023-08-20 PROCEDURE — 99233 PR SUBSEQUENT HOSPITAL CARE,LEVL III: ICD-10-PCS | Mod: ,,, | Performed by: INTERNAL MEDICINE

## 2023-08-20 PROCEDURE — 99233 SBSQ HOSP IP/OBS HIGH 50: CPT | Mod: ,,, | Performed by: INTERNAL MEDICINE

## 2023-08-20 PROCEDURE — 96372 THER/PROPH/DIAG INJ SC/IM: CPT | Performed by: PHYSICIAN ASSISTANT

## 2023-08-20 PROCEDURE — 87040 BLOOD CULTURE FOR BACTERIA: CPT | Performed by: INTERNAL MEDICINE

## 2023-08-20 PROCEDURE — 36415 COLL VENOUS BLD VENIPUNCTURE: CPT | Performed by: INTERNAL MEDICINE

## 2023-08-20 PROCEDURE — 27000207 HC ISOLATION

## 2023-08-20 PROCEDURE — 80048 BASIC METABOLIC PNL TOTAL CA: CPT | Performed by: PHYSICIAN ASSISTANT

## 2023-08-20 PROCEDURE — 11000001 HC ACUTE MED/SURG PRIVATE ROOM

## 2023-08-20 PROCEDURE — 94761 N-INVAS EAR/PLS OXIMETRY MLT: CPT

## 2023-08-20 RX ORDER — POTASSIUM CHLORIDE 20 MEQ/1
40 TABLET, EXTENDED RELEASE ORAL ONCE
Status: COMPLETED | OUTPATIENT
Start: 2023-08-20 | End: 2023-08-20

## 2023-08-20 RX ORDER — GABAPENTIN 300 MG/1
300 CAPSULE ORAL NIGHTLY
Status: DISCONTINUED | OUTPATIENT
Start: 2023-08-20 | End: 2023-08-22 | Stop reason: HOSPADM

## 2023-08-20 RX ORDER — LANOLIN ALCOHOL/MO/W.PET/CERES
400 CREAM (GRAM) TOPICAL 2 TIMES DAILY
Status: DISPENSED | OUTPATIENT
Start: 2023-08-20 | End: 2023-08-21

## 2023-08-20 RX ORDER — LIDOCAINE HYDROCHLORIDE 20 MG/ML
SOLUTION OROPHARYNGEAL 4 TIMES DAILY PRN
Status: DISCONTINUED | OUTPATIENT
Start: 2023-08-20 | End: 2023-08-22 | Stop reason: HOSPADM

## 2023-08-20 RX ADMIN — CIPROFLOXACIN 400 MG: 400 INJECTION, SOLUTION INTRAVENOUS at 09:08

## 2023-08-20 RX ADMIN — SODIUM CHLORIDE: 9 INJECTION, SOLUTION INTRAVENOUS at 06:08

## 2023-08-20 RX ADMIN — LIDOCAINE HYDROCHLORIDE: 20 SOLUTION OROPHARYNGEAL at 11:08

## 2023-08-20 RX ADMIN — Medication 10 ML: at 05:08

## 2023-08-20 RX ADMIN — BUPRENORPHINE AND NALOXONE 1 FILM: 4; 1 FILM BUCCAL; SUBLINGUAL at 10:08

## 2023-08-20 RX ADMIN — POTASSIUM CHLORIDE 40 MEQ: 1500 TABLET, EXTENDED RELEASE ORAL at 09:08

## 2023-08-20 RX ADMIN — METHIMAZOLE 5 MG: 5 TABLET ORAL at 03:08

## 2023-08-20 RX ADMIN — METHIMAZOLE 5 MG: 5 TABLET ORAL at 09:08

## 2023-08-20 RX ADMIN — METRONIDAZOLE 500 MG: 500 TABLET ORAL at 05:08

## 2023-08-20 RX ADMIN — HEPARIN SODIUM 5000 UNITS: 5000 INJECTION INTRAVENOUS; SUBCUTANEOUS at 05:08

## 2023-08-20 RX ADMIN — AMLODIPINE BESYLATE 5 MG: 5 TABLET ORAL at 09:08

## 2023-08-20 RX ADMIN — Medication 400 MG: at 09:08

## 2023-08-20 NOTE — ASSESSMENT & PLAN NOTE
Patient uses street Tramadol and presented to [facility] for rehab from opioids.  Tox Screen positive for THC only.  -COWS q4 with prn Suboxone  -Symptomatic treatment as needed with Clonidine, Dicyclomine, Loperamide as needed

## 2023-08-20 NOTE — NURSING TRANSFER
Nursing Transfer Note      8/20/2023   12:30 PM    Nurse giving handoff: TIM Calixto  Nurse receiving handoff: TIM Meyers    Reason patient is being transferred: Stepped down from ICU    Transfer To: Rm 350    Transfer via wheelchair    Transfer with patient belongings, two large white bags    Transported by TIM Calixto    Transfer Vital Signs:  Blood Pressure:146/81  Heart Rate:106  O2:97    Temperature:98.2  Respirations:21    Telemetry: Orders noted, box not yet assigned  Order for Tele Monitor? Yes    4eyes on Skin: yes    Medicines sent: Lidocaine gel for toothache    Any special needs or follow-up needed: Pt admitted from [facility]. Paperwork in chart to return upon discharge    Patient belongings transferred with patient: Yes    Chart send with patient: Yes    Notified: Pt declined family notification    Patient reassessed at: 8/20/23 @ 1245  Upon arrival to floor: patient oriented to room, call bell in reach, and bed in lowest position. Nurse's station called from room to notify nurse of patient's arrival.

## 2023-08-20 NOTE — SUBJECTIVE & OBJECTIVE
Interval History: Patient is awake and alert this morning.  Tolerated solids last night.  Still with occasional nausea, but no emesis. No chest pain or SOB.  Continued loose stools and abdominal discomfort, but a little better.  No F/c.      Review of Systems   Constitutional:  Positive for appetite change (improving). Negative for chills, diaphoresis, fatigue and fever.   HENT:  Negative for ear pain.    Eyes:  Negative for pain.   Respiratory:  Negative for cough, shortness of breath and wheezing.    Cardiovascular:  Negative for chest pain and palpitations.   Gastrointestinal:  Positive for abdominal pain (improved), diarrhea (Loose Stools - slightly better) and nausea. Negative for abdominal distention, constipation and vomiting.   Genitourinary:  Negative for dysuria, flank pain, frequency, hematuria and urgency.   Musculoskeletal:  Negative for arthralgias, back pain, myalgias, neck pain and neck stiffness.   Skin:  Negative for color change and pallor.   Neurological:  Negative for dizziness, syncope, weakness, light-headedness and headaches.   Psychiatric/Behavioral:  Negative for agitation and confusion.      Objective:     Vital Signs (Most Recent):  Temp: 98.9 °F (37.2 °C) (08/20/23 0301)  Pulse: 104 (08/20/23 1000)  Resp: 20 (08/20/23 1000)  BP: (!) 141/90 (08/20/23 1000)  SpO2: 99 % (08/20/23 1000) Vital Signs (24h Range):  Temp:  [98.5 °F (36.9 °C)-98.9 °F (37.2 °C)] 98.9 °F (37.2 °C)  Pulse:  [104-142] 104  Resp:  [17-38] 20  SpO2:  [94 %-100 %] 99 %  BP: (114-158)/() 141/90     Weight: 56.1 kg (123 lb 10.9 oz)  Body mass index is 22.62 kg/m².    Intake/Output Summary (Last 24 hours) at 8/20/2023 1218  Last data filed at 8/20/2023 0600  Gross per 24 hour   Intake 1115 ml   Output 1750 ml   Net -635 ml           Physical Exam  Vitals and nursing note reviewed.   Constitutional:       General: She is not in acute distress.     Appearance: Normal appearance. She is well-developed and normal weight.  She is ill-appearing (mild). She is not toxic-appearing.   HENT:      Head: Normocephalic and atraumatic.      Right Ear: External ear normal.      Left Ear: External ear normal.      Nose: Nose normal.      Mouth/Throat:      Mouth: Mucous membranes are moist.      Pharynx: Oropharynx is clear.   Eyes:      General: No scleral icterus.     Conjunctiva/sclera: Conjunctivae normal.   Neck:      Trachea: No tracheal deviation.   Cardiovascular:      Rate and Rhythm: Regular rhythm. Tachycardia present.      Heart sounds: Normal heart sounds. No murmur heard.     No gallop.   Pulmonary:      Effort: Pulmonary effort is normal. No respiratory distress.      Breath sounds: Normal breath sounds. No wheezing or rales.   Abdominal:      General: Bowel sounds are normal. There is no distension.      Palpations: Abdomen is soft.      Tenderness: There is abdominal tenderness (mild diffuse - improving). There is no right CVA tenderness, left CVA tenderness or guarding.   Musculoskeletal:         General: No deformity. Normal range of motion.      Cervical back: Normal range of motion and neck supple. No rigidity.   Skin:     General: Skin is warm and dry.      Coloration: Skin is not pale.      Findings: No erythema or rash.   Neurological:      General: No focal deficit present.      Mental Status: She is alert and oriented to person, place, and time. Mental status is at baseline.      Cranial Nerves: No cranial nerve deficit.      Motor: No weakness or abnormal muscle tone.   Psychiatric:         Mood and Affect: Mood normal.         Behavior: Behavior normal.             Significant Labs: All pertinent labs within the past 24 hours have been reviewed.    Significant Imaging: I have reviewed all pertinent imaging results/findings within the past 24 hours.

## 2023-08-20 NOTE — ASSESSMENT & PLAN NOTE
SBP improved.  Home meds: Atenolol 25 mg QD  -Hold b-blocker given ? Cocaine use in past  -Continue with Amlodipine 5mg bid  -Monitor and adjust as needed

## 2023-08-20 NOTE — ASSESSMENT & PLAN NOTE
Viral Gastroenteritis +/- Opioid Withdrawal +/- Hyperthyroidism.  Patient presented with 1 day of N/V/D.  Patient arrived to [facility] on 8/18/2023 for opioid dependence treatment.  While there, developed N/V/D and referred to ED.  Patient has been using street Tramadol, but she thinks it was something else.  Reported last use of Tramadol was a week PTA.  CT A/P on admission with mildly dilated fluid-filled loops of small bowel throughout the abdomen and pelvis without discrete transition point. Liquid stool throughout the colon.  Findings may represent a nonspecific enteritis/diarrheal illness versus low-grade small bowel obstruction.  Labs on admission with normal WBC with no LS, Lipase negative, UA unremarkable, TSH <0.010 and fT4 2.85.  She was treated with NS x 2L and started on antibiotics to cover possible enteritis.  Still with loose stools, but improving.  Less abdominal pain.  Appetite improving and tolerated solids yesterday.  Mild nausea.  Transfer out of ICU.    Stool Studies:  FOBT negative, C diff negative, Stool Culture negative, E coli 0157 negative    Plan:  Discontinue antibiotics today  Follow up Stool Studies  Monitor with withdrawal symptoms with COWS - prn Suboxone  Continue with Methimazole for Hyperthyroidism  Anti-emetics prn

## 2023-08-20 NOTE — ASSESSMENT & PLAN NOTE
Poor adherence with Methimazole.  TSH suppressed at <0.010 and fT4 of 2.85  -Continue with Methimazole 5mg tid  -Follow up with Endocrine on discharge

## 2023-08-20 NOTE — CARE UPDATE
08/19/23 1949   Patient Assessment/Suction   Level of Consciousness (AVPU) alert   Respiratory Effort Normal;Unlabored   Expansion/Accessory Muscles/Retractions no use of accessory muscles;no retractions   PRE-TX-O2   Device (Oxygen Therapy) room air   SpO2 98 %   Pulse Oximetry Type Continuous   $ Pulse Oximetry - Multiple Charge Pulse Oximetry - Multiple   Pulse 108   Resp 19

## 2023-08-20 NOTE — PROGRESS NOTES
[facility] Intensive Care [facility] Medicine  Progress Note    Patient Name: Morgan Caro  MRN: [id number]  Patient Class: OP- Observation   Admission Date: 8/18/2023  Length of Stay: 0 days  Attending Physician: Koko Morris MD  Primary Care Provider: [facility] -        Subjective:     Principal Problem:Abdominal pain      HPI:  Ms. Morgan Burgess is a 43 y.o. female with PMH of HTN, cardiomegaly, prior thyrotoxicosis with thyrotoxic crisis, who presented to [facility] [place] ED on 8/18/23 due to abdominal pain x 1 day. She describes the pain as a sharp pressure that radiates to her back and her vagina. She notes associated nausea, vomiting and diarrhea. She attributes this to detoxing from Tramadol, which she took her last dose of 1 week ago. She is currently staying at the [facility]. She denies chest pain, SOB, dyspnea. She was evaluted in the ED with labs showing no leukocytosis or left shift. A metabolic panel showed elevated ALP of 287, with Tbili of 1.7. A TSH was low at <0.010, and a Free T4 was 2.85. A UA was dilute with 1+ protein and 2+ ketones. A CT of the abdomen and pelvis showed mild dialtion of the small bowel without transition point, and liquid stool throughout the colon concerning for enteritis vs. Diarrhea illness vs. Low-grade Small bowel obstruction. She was treated in the ED with IV fluids, bentyl, GI cocktail, and tylenol She was placed on observation.       Overview/Hospital Course:  See below      Interval History: Patient is awake and alert this morning.  Tolerated solids last night.  Still with occasional nausea, but no emesis. No chest pain or SOB.  Continued loose stools and abdominal discomfort, but a little better.  No F/c.      Review of Systems   Constitutional:  Positive for appetite change (improving). Negative for chills, diaphoresis, fatigue and fever.   HENT:  Negative for ear pain.    Eyes:  Negative for pain.   Respiratory:  Negative for cough, shortness  of breath and wheezing.    Cardiovascular:  Negative for chest pain and palpitations.   Gastrointestinal:  Positive for abdominal pain (improved), diarrhea (Loose Stools - slightly better) and nausea. Negative for abdominal distention, constipation and vomiting.   Genitourinary:  Negative for dysuria, flank pain, frequency, hematuria and urgency.   Musculoskeletal:  Negative for arthralgias, back pain, myalgias, neck pain and neck stiffness.   Skin:  Negative for color change and pallor.   Neurological:  Negative for dizziness, syncope, weakness, light-headedness and headaches.   Psychiatric/Behavioral:  Negative for agitation and confusion.      Objective:     Vital Signs (Most Recent):  Temp: 98.9 °F (37.2 °C) (08/20/23 0301)  Pulse: 104 (08/20/23 1000)  Resp: 20 (08/20/23 1000)  BP: (!) 141/90 (08/20/23 1000)  SpO2: 99 % (08/20/23 1000) Vital Signs (24h Range):  Temp:  [98.5 °F (36.9 °C)-98.9 °F (37.2 °C)] 98.9 °F (37.2 °C)  Pulse:  [104-142] 104  Resp:  [17-38] 20  SpO2:  [94 %-100 %] 99 %  BP: (114-158)/() 141/90     Weight: 56.1 kg (123 lb 10.9 oz)  Body mass index is 22.62 kg/m².    Intake/Output Summary (Last 24 hours) at 8/20/2023 1218  Last data filed at 8/20/2023 0600  Gross per 24 hour   Intake 1115 ml   Output 1750 ml   Net -635 ml           Physical Exam  Vitals and nursing note reviewed.   Constitutional:       General: She is not in acute distress.     Appearance: Normal appearance. She is well-developed and normal weight. She is ill-appearing (mild). She is not toxic-appearing.   HENT:      Head: Normocephalic and atraumatic.      Right Ear: External ear normal.      Left Ear: External ear normal.      Nose: Nose normal.      Mouth/Throat:      Mouth: Mucous membranes are moist.      Pharynx: Oropharynx is clear.   Eyes:      General: No scleral icterus.     Conjunctiva/sclera: Conjunctivae normal.   Neck:      Trachea: No tracheal deviation.   Cardiovascular:      Rate and Rhythm: Regular  rhythm. Tachycardia present.      Heart sounds: Normal heart sounds. No murmur heard.     No gallop.   Pulmonary:      Effort: Pulmonary effort is normal. No respiratory distress.      Breath sounds: Normal breath sounds. No wheezing or rales.   Abdominal:      General: Bowel sounds are normal. There is no distension.      Palpations: Abdomen is soft.      Tenderness: There is abdominal tenderness (mild diffuse - improving). There is no right CVA tenderness, left CVA tenderness or guarding.   Musculoskeletal:         General: No deformity. Normal range of motion.      Cervical back: Normal range of motion and neck supple. No rigidity.   Skin:     General: Skin is warm and dry.      Coloration: Skin is not pale.      Findings: No erythema or rash.   Neurological:      General: No focal deficit present.      Mental Status: She is alert and oriented to person, place, and time. Mental status is at baseline.      Cranial Nerves: No cranial nerve deficit.      Motor: No weakness or abnormal muscle tone.   Psychiatric:         Mood and Affect: Mood normal.         Behavior: Behavior normal.             Significant Labs: All pertinent labs within the past 24 hours have been reviewed.    Significant Imaging: I have reviewed all pertinent imaging results/findings within the past 24 hours.      Assessment/Plan:      * Abdominal pain  Viral Gastroenteritis +/- Opioid Withdrawal +/- Hyperthyroidism.  Patient presented with 1 day of N/V/D.  Patient arrived to [facility] on 8/18/2023 for opioid dependence treatment.  While there, developed N/V/D and referred to ED.  Patient has been using street Tramadol, but she thinks it was something else.  Reported last use of Tramadol was a week PTA.  CT A/P on admission with mildly dilated fluid-filled loops of small bowel throughout the abdomen and pelvis without discrete transition point. Liquid stool throughout the colon.  Findings may represent a nonspecific enteritis/diarrheal  illness versus low-grade small bowel obstruction.  Labs on admission with normal WBC with no LS, Lipase negative, UA unremarkable, TSH <0.010 and fT4 2.85.  She was treated with NS x 2L and started on antibiotics to cover possible enteritis.  Still with loose stools, but improving.  Less abdominal pain.  Appetite improving and tolerated solids yesterday.  Mild nausea.  Transfer out of ICU.    Stool Studies:  FOBT negative, C diff negative, Stool Culture negative, E coli 0157 negative    Plan:  Discontinue antibiotics today  Follow up Stool Studies  Monitor with withdrawal symptoms with COWS - prn Suboxone  Continue with Methimazole for Hyperthyroidism  Anti-emetics prn    Opioid abuse  Patient uses street Tramadol and presented to [facility] for rehab from opioids.  Tox Screen positive for THC only.  -COWS q4 with prn Suboxone  -Symptomatic treatment as needed with Clonidine, Dicyclomine, Loperamide as needed      Hyperthyroidism  Poor adherence with Methimazole.  TSH suppressed at <0.010 and fT4 of 2.85  -Continue with Methimazole 5mg tid  -Follow up with Endocrine on discharge      Primary hypertension  SBP improved.  Home meds: Atenolol 25 mg QD  -Hold b-blocker given ? Cocaine use in past  -Continue with Amlodipine 5mg bid  -Monitor and adjust as needed        VTE Risk Mitigation (From admission, onward)         Ordered     heparin (porcine) injection 5,000 Units  Every 8 hours         08/19/23 0121     IP VTE HIGH RISK PATIENT  Once         08/19/23 0121     Place sequential compression device  Until discontinued         08/19/23 0121                Discharge Planning   FLORY:      Code Status: Full Code   Is the patient medically ready for discharge?:     Reason for patient still in hospital (select all that apply): Patient trending condition, Treatment and Consult recommendations  Discharge Plan A: Home with family                  Koko Morris MD  Department of Hospital Medicine   [facility] - Intensive  Care ([place])

## 2023-08-21 LAB
E COLI SXT1 STL QL IA: NEGATIVE
E COLI SXT2 STL QL IA: NEGATIVE

## 2023-08-21 PROCEDURE — 99239 HOSP IP/OBS DSCHRG MGMT >30: CPT | Mod: ,,, | Performed by: INTERNAL MEDICINE

## 2023-08-21 PROCEDURE — 11000001 HC ACUTE MED/SURG PRIVATE ROOM

## 2023-08-21 PROCEDURE — 99239 PR HOSPITAL DISCHARGE DAY,>30 MIN: ICD-10-PCS | Mod: ,,, | Performed by: INTERNAL MEDICINE

## 2023-08-21 NOTE — ASSESSMENT & PLAN NOTE
Poor adherence with Methimazole.  TSH suppressed at <0.010 and fT4 of 2.85  -Continue with Methimazole   -Follow up with Endocrine

## 2023-08-21 NOTE — ASSESSMENT & PLAN NOTE
Viral Gastroenteritis +/- Opioid Withdrawal +/- Hyperthyroidism.  Patient presented with 1 day of N/V/D.  Patient arrived to [facility] on 8/18/2023 for opioid dependence treatment.  While there, developed N/V/D and referred to ED.  Patient has been using street Tramadol, but she thinks it was something else.  Reported last use of Tramadol was a week PTA.  CT A/P on admission with mildly dilated fluid-filled loops of small bowel throughout the abdomen and pelvis without discrete transition point. Liquid stool throughout the colon.  Findings may represent a nonspecific enteritis/diarrheal illness versus low-grade small bowel obstruction.  Labs on admission with normal WBC with no LS, Lipase negative, UA unremarkable, TSH <0.010 and fT4 2.85.  She was treated with NS x 2L and started on antibiotics to cover possible enteritis.  Still with loose stools, but improving.  Less abdominal pain.  Appetite improving and tolerated solids. Transferred out of ICU on 8/20/2023.  Diarrhea resolving.  Antibiotics discontinued on 8/20/2023.  Patient left AMA on 8/20/202 in evening.    Stool Studies:  FOBT negative, C diff negative, Stool Culture negative, E coli 0157 negative, Giardia/Crypto negative, Rotavirus negative.

## 2023-08-21 NOTE — DISCHARGE SUMMARY
[facility] [address] Medicine  Discharge Summary      Patient Name: Morgan Caro  MRN: [id number]  IAYANA: [id number]  Patient Class: IP- Inpatient  Admission Date: 8/18/2023  Hospital Length of Stay: 1 days  Discharge Date and Time: No discharge date for patient encounter.  Attending Physician: Koko Milton MD   Discharging Provider: Koko Milton MD  Primary Care Provider: [facility] -    Primary Care Team: Networked reference to record PCT     HPI:   Ms. Morgan Burgess is a 43 y.o. female with PMH of HTN, cardiomegaly, prior thyrotoxicosis with thyrotoxic crisis, who presented to [facility] ED on 8/18/23 due to abdominal pain x 1 day. She describes the pain as a sharp pressure that radiates to her back and her vagina. She notes associated nausea, vomiting and diarrhea. She attributes this to detoxing from Tramadol, which she took her last dose of 1 week ago. She is currently staying at the [facility]. She denies chest pain, SOB, dyspnea. She was evaluted in the ED with labs showing no leukocytosis or left shift. A metabolic panel showed elevated ALP of 287, with Tbili of 1.7. A TSH was low at <0.010, and a Free T4 was 2.85. A UA was dilute with 1+ protein and 2+ ketones. A CT of the abdomen and pelvis showed mild dialtion of the small bowel without transition point, and liquid stool throughout the colon concerning for enteritis vs. Diarrhea illness vs. Low-grade Small bowel obstruction. She was treated in the ED with IV fluids, bentyl, GI cocktail, and tylenol She was placed on observation.       * No surgery found *      Hospital Course:   See below - Patient left AMA on the evening of 8/20/2023.       Goals of Care Treatment Preferences:  Code Status: Full Code      Consults:   Consults (From admission, onward)        Status Ordering Provider     Inpatient consult to Midline team  Once        Provider:  (Not yet assigned)    Completed KOKO MILTON           Cardiac/Vascular  Primary hypertension  SBP improved.  Home meds: Atenolol 25 mg QD        Endocrine  Hyperthyroidism  Poor adherence with Methimazole.  TSH suppressed at <0.010 and fT4 of 2.85  -Continue with Methimazole   -Follow up with Endocrine      GI  * Abdominal pain  Viral Gastroenteritis +/- Opioid Withdrawal +/- Hyperthyroidism.  Patient presented with 1 day of N/V/D.  Patient arrived to [facility] on 8/18/2023 for opioid dependence treatment.  While there, developed N/V/D and referred to ED.  Patient has been using street Tramadol, but she thinks it was something else.  Reported last use of Tramadol was a week PTA.  CT A/P on admission with mildly dilated fluid-filled loops of small bowel throughout the abdomen and pelvis without discrete transition point. Liquid stool throughout the colon.  Findings may represent a nonspecific enteritis/diarrheal illness versus low-grade small bowel obstruction.  Labs on admission with normal WBC with no LS, Lipase negative, UA unremarkable, TSH <0.010 and fT4 2.85.  She was treated with NS x 2L and started on antibiotics to cover possible enteritis.  Still with loose stools, but improving.  Less abdominal pain.  Appetite improving and tolerated solids. Transferred out of ICU on 8/20/2023.  Diarrhea resolving.  Antibiotics discontinued on 8/20/2023.  Patient left AMA on 8/20/202 in evening.    Stool Studies:  FOBT negative, C diff negative, Stool Culture negative, E coli 0157 negative, Giardia/Crypto negative, Rotavirus negative.        Other  Opioid abuse  Patient uses street Tramadol and presented to [facility] for rehab from opioids.  Tox Screen positive for THC only.  She did not want to return to [facility] on discharge.  She plans to seek care at another outpatient rehab.          Final Active Diagnoses:    Diagnosis Date Noted POA    PRINCIPAL PROBLEM:  Abdominal pain [R10.9] 08/19/2023 Yes    Opioid abuse [F11.10] 08/19/2023 Yes     Hyperthyroidism [E05.90] 08/19/2023 Yes    Primary hypertension [I10] 03/04/2023 Yes      Problems Resolved During this Admission:       Discharged Condition: against medical advice    Disposition: Left Against Medical Adv*      Significant Diagnostic Studies: Labs:   BMP:   Recent Labs   Lab 08/20/23  0651   *      K 3.6      CO2 21*   BUN 8   CREATININE 0.6   CALCIUM 9.1   MG 1.6    and CBC   Recent Labs   Lab 08/20/23  0651   WBC 6.81   HGB 11.7*   HCT 37.3          Pending Diagnostic Studies:     Procedure Component Value Units Date/Time    Calprotectin, Stool [[id number]] Collected: 08/19/23 0315    Order Status: Sent Lab Status: In process Updated: 08/19/23 2027    Specimen: Stool     Echo [[id number]] Resulted: 08/21/23 0727    Order Status: Sent Lab Status: In process Updated: 08/21/23 0727    Fecal fat, qualitative [[id number]] Collected: 08/19/23 0315    Order Status: Sent Lab Status: In process Updated: 08/19/23 2027    Specimen: Stool     Pancreatic elastase, fecal [[id number]] Collected: 08/19/23 0315    Order Status: Sent Lab Status: In process Updated: 08/19/23 2027    Specimen: Stool     Stool Exam-Ova,Cysts,Parasites [[id number]] Collected: 08/19/23 0315    Order Status: Sent Lab Status: In process Updated: 08/19/23 2027    Specimen: Stool          Medications:  Reconciled Home Medications:      Medication List      CONTINUE taking these medications    atenoloL 25 MG tablet  Commonly known as: TENORMIN  Take 1 tablet (25 mg total) by mouth once daily.     EScitalopram oxalate 5 MG Tab  Commonly known as: LEXAPRO  Take 1 tablet (5 mg total) by mouth once daily.     methIMAzole 10 MG Tab  Commonly known as: TAPAZOLE  Take 1 tablet by mouth twice daily for 1 week, then reduce to 1 tablet by mouth once daily. Go to Endocrinologist for adjustments asap.     QUEtiapine 100 MG Tab  Commonly known as: SEROQUEL  Take 1 tablet (100 mg total) by mouth every evening.            Indwelling  Lines/Drains at time of discharge:   Lines/Drains/Airways     None                 Time spent on the discharge of patient: 35 minutes         Koko Morris MD  Department of Hospital Medicine  [facility] Surg ([address])

## 2023-08-21 NOTE — ASSESSMENT & PLAN NOTE
Patient uses street Tramadol and presented to [facility] for rehab from opioids.  Tox Screen positive for THC only.  She did not want to return to [facility] on discharge.  She plans to seek care at another outpatient rehab.

## 2023-08-21 NOTE — PLAN OF CARE
Patient will have to call and schedule f/u hospital visit  with clinic.  Patient family will help transport home at discharge.   08/21/23 1255   Final Note   Assessment Type Final Discharge Note   Anticipated Discharge Disposition Home   Hospital Resources/Appts/Education Provided Provided patient/caregiver with written discharge plan information   Post-Acute Status   Discharge Delays None known at this time     [facility] ([address])  Discharge Final Note    Primary Care Provider: [facility] -    Expected Discharge Date: 8/21/2023    Final Discharge Note (most recent)       Final Note - 08/21/23 1255          Final Note    Assessment Type Final Discharge Note (P)      Anticipated Discharge Disposition Home or Self Care (P)      Hospital Resources/Appts/Education Provided Provided patient/caregiver with written discharge plan information (P)         Post-Acute Status    Discharge Delays None known at this time (P)                      Important Message from Medicare

## 2023-08-21 NOTE — NURSING
Patient requested to leave AMA, house supervisor notified along with DORIAN Diaz. Midline @ left upper arm removed with tip intact with no complications. Patient signed AMA form and security notified to retrieve patient belongs. Patient escorted by security off the floor.

## 2023-08-22 LAB
BACTERIA BLD CULT: ABNORMAL
BACTERIA STL CULT: NORMAL

## 2023-08-23 ENCOUNTER — HOSPITAL ENCOUNTER (EMERGENCY)
Facility: HOSPITAL | Age: 43
Discharge: HOME | End: 2023-08-23
Attending: EMERGENCY MEDICINE
Payer: MEDICAID

## 2023-08-23 VITALS
TEMPERATURE: 99 F | SYSTOLIC BLOOD PRESSURE: 137 MMHG | WEIGHT: 135 LBS | HEART RATE: 113 BPM | HEIGHT: 62 IN | BODY MASS INDEX: 24.84 KG/M2 | OXYGEN SATURATION: 96 % | DIASTOLIC BLOOD PRESSURE: 88 MMHG | RESPIRATION RATE: 18 BRPM

## 2023-08-23 DIAGNOSIS — K08.89 PAIN, DENTAL: Primary | ICD-10-CM

## 2023-08-23 LAB
FAT STL QL: NORMAL
NEUTRAL FAT STL QL: NORMAL

## 2023-08-23 PROCEDURE — 99283 EMERGENCY DEPT VISIT LOW MDM: CPT

## 2023-08-23 PROCEDURE — 25000003 PHARM REV CODE 250: Performed by: STUDENT IN AN ORGANIZED HEALTH CARE EDUCATION/TRAINING PROGRAM

## 2023-08-23 RX ORDER — ACETAMINOPHEN 500 MG
1000 TABLET ORAL
Status: COMPLETED | OUTPATIENT
Start: 2023-08-23 | End: 2023-08-23

## 2023-08-23 RX ORDER — AMOXICILLIN AND CLAVULANATE POTASSIUM 875; 125 MG/1; MG/1
1 TABLET, FILM COATED ORAL 2 TIMES DAILY
Qty: 14 TABLET | Refills: 0 | OUTPATIENT
Start: 2023-08-23 | End: 2023-11-11

## 2023-08-23 RX ORDER — AMOXICILLIN AND CLAVULANATE POTASSIUM 875; 125 MG/1; MG/1
1 TABLET, FILM COATED ORAL
Status: COMPLETED | OUTPATIENT
Start: 2023-08-23 | End: 2023-08-23

## 2023-08-23 RX ADMIN — AMOXICILLIN AND CLAVULANATE POTASSIUM 1 TABLET: 875; 125 TABLET, FILM COATED ORAL at 10:08

## 2023-08-23 RX ADMIN — ACETAMINOPHEN 1000 MG: 500 TABLET, FILM COATED ORAL at 10:08

## 2023-08-24 LAB
BACTERIA BLD CULT: NORMAL
CALPROTECTIN STL-MCNT: <27.1 MCG/G
ELASTASE 1, FECAL: 197 MCG/G

## 2023-08-24 NOTE — ED TRIAGE NOTES
Pt presents to ED c/o dental right upper and lower onset x 1 week.  Associated symptoms headache.  Denies any other symptoms.  Denies taking medication.  Pain 10/10.

## 2023-08-24 NOTE — ED PROVIDER NOTES
Encounter Date: 8/23/2023       History     Chief Complaint   Patient presents with    Dental Pain     Pt c/o dental pain to R mouth. Pt reports two broken teeth, one on upper and one on lower R x 2 weeks. +Hot/ cold sensitivity. No other complaints. Pt A/O x 4 w/ ABCs intact, AND. VSS.      43-year-old female presents for evaluation of dental pain.  Patient tells me she has had 2 broken teeth for the last approximately 2 weeks and is experiencing hot and cold sensitivity, pain.  She is not been evaluated by a dentist.  She is no additional complaints.  No shortness breath, no trouble swallowing.  No foul taste or odor.      Review of patient's allergies indicates:   Allergen Reactions    Ibuprofen Hives and Edema     Past Medical History:   Diagnosis Date    Depression     Migraines     Primary hypertension 03/04/2023    Thyroid disease      History reviewed. No pertinent surgical history.  No family history on file.  Social History     Tobacco Use    Smoking status: Every Day     Current packs/day: 2.00     Types: Cigarettes    Smokeless tobacco: Never    Tobacco comments:     1 pack of cigarettes will last the pt 2 days.   Substance Use Topics    Alcohol use: Yes     Comment: two drinks every 3 days.    Drug use: Yes     Types: Marijuana     Comment: uses marijuasa daily      Review of Systems   Constitutional:  Negative for chills, fatigue and fever.   HENT:  Positive for dental problem. Negative for congestion, hearing loss, sore throat and trouble swallowing.    Eyes:  Negative for visual disturbance.   Respiratory:  Negative for cough, chest tightness and shortness of breath.    Cardiovascular:  Negative for chest pain.   Gastrointestinal:  Negative for abdominal pain and nausea.   Endocrine: Negative for polyuria.   Genitourinary:  Negative for difficulty urinating.   Musculoskeletal:  Negative for arthralgias and myalgias.   Skin:  Negative for rash.   Neurological:  Negative for dizziness and headaches.    Psychiatric/Behavioral:  The patient is not nervous/anxious.        Physical Exam     Initial Vitals [08/23/23 2140]   BP Pulse Resp Temp SpO2   121/80 (!) 113 18 98.8 °F (37.1 °C) 97 %      MAP       --         Physical Exam    Nursing note and vitals reviewed.  Constitutional: She appears well-developed and well-nourished.   HENT:   Head: Normocephalic and atraumatic.   Overall poor oral dentition.  Patient is missing a large majority of her teeth.  I do not appreciate any fluctuance suggestive of abscess however patient does have findings suggestive of diffuse gingivitis.   Eyes: Conjunctivae are normal. Pupils are equal, round, and reactive to light.   Neck: Neck supple.   Normal range of motion.  Cardiovascular:  Intact distal pulses.           Pulmonary/Chest: No respiratory distress.   Musculoskeletal:         General: Normal range of motion.      Cervical back: Normal range of motion and neck supple.     Neurological: She is alert and oriented to person, place, and time. GCS score is 15. GCS eye subscore is 4. GCS verbal subscore is 5. GCS motor subscore is 6.   Skin: Skin is warm and dry. Capillary refill takes less than 2 seconds.   Psychiatric: She has a normal mood and affect. Her behavior is normal. Judgment and thought content normal.         ED Course   Procedures  Labs Reviewed - No data to display       Imaging Results    None          Medications   acetaminophen tablet 1,000 mg (has no administration in time range)   amoxicillin-clavulanate 875-125mg per tablet 1 tablet (has no administration in time range)     Medical Decision Making  43-year-old female with overall poor oral dentition presents for dental pain.  She is not been seen by a dentist.  No findings suggestive of abscess.  Patient is nontoxic and well-appearing.  I have given her Augmentin and Tylenol in the emergency room.  I have given her prescription for Augmentin.  I have recommended that she follow up with a dentist.  She is  discharged home in stable condition.        Risk  OTC drugs.  Prescription drug management.                               Clinical Impression:   Final diagnoses:  [K08.89] Pain, dental (Primary)        ED Disposition Condition    Discharge Stable          ED Prescriptions       Medication Sig Dispense Start Date End Date Auth. Provider    amoxicillin-clavulanate 875-125mg (AUGMENTIN) 875-125 mg per tablet Take 1 tablet by mouth 2 (two) times daily. 14 tablet 8/23/2023 -- Gildardo Seymour PA-C          Follow-up Information       Follow up With Specialties Details Why Contact Info    [facility] -  Schedule an appointment as soon as possible for a visit in 1 week  [address] [place] [address]  [phone]      [facility] - Emergency Dept Emergency Medicine Go to  As needed, If symptoms worsen [address]  [facility] [id number] [phone]             Gildardo Seymour PA-C  08/23/23 2165

## 2023-08-24 NOTE — DISCHARGE INSTRUCTIONS
You need to follow-up with a dentist.  The emergency room can not treat your dental pain.    - Please read all discharge instructions. Follow all written and verbal discharge instructions.   - Continue all home medications as prescribed and start any new prescriptions given to you in the emergency room.   - Followup with your primary care and make them aware of your recent ER visit. Followup with any additional providers or specialists as discussed.   - Return to the nearest emergency room for any new or worsening symptoms, non-resolution of your current symptoms or if you feel you need reevaluation.    - Do not hesitate to contact the emergency department if you have questions about your diagnosis, discharge instructions, followup recommendations, or medications.

## 2023-08-25 LAB — BACTERIA BLD CULT: NORMAL

## 2023-08-26 LAB — O+P STL MICRO: NORMAL

## 2023-08-28 LAB
H PYLORI AG STL QL IA: NOT DETECTED
SPECIMEN SOURCE: NORMAL

## 2023-09-11 PROBLEM — N39.0 ACUTE UTI: Status: RESOLVED | Noted: 2023-06-09 | Resolved: 2023-09-11

## 2023-11-10 PROCEDURE — 99284 EMERGENCY DEPT VISIT MOD MDM: CPT | Mod: 25

## 2023-11-10 PROCEDURE — 96375 TX/PRO/DX INJ NEW DRUG ADDON: CPT

## 2023-11-10 PROCEDURE — 99285 EMERGENCY DEPT VISIT HI MDM: CPT | Mod: 25

## 2023-11-10 PROCEDURE — 96361 HYDRATE IV INFUSION ADD-ON: CPT

## 2023-11-10 PROCEDURE — 96365 THER/PROPH/DIAG IV INF INIT: CPT

## 2023-11-10 PROCEDURE — 87651 STREP A DNA AMP PROBE: CPT

## 2023-11-10 PROCEDURE — 87502 INFLUENZA DNA AMP PROBE: CPT

## 2023-11-11 ENCOUNTER — HOSPITAL ENCOUNTER (EMERGENCY)
Facility: HOSPITAL | Age: 43
Discharge: HOME | End: 2023-11-11
Attending: STUDENT IN AN ORGANIZED HEALTH CARE EDUCATION/TRAINING PROGRAM

## 2023-11-11 VITALS
SYSTOLIC BLOOD PRESSURE: 144 MMHG | OXYGEN SATURATION: 98 % | WEIGHT: 120 LBS | RESPIRATION RATE: 20 BRPM | HEART RATE: 91 BPM | DIASTOLIC BLOOD PRESSURE: 82 MMHG | BODY MASS INDEX: 21.95 KG/M2 | TEMPERATURE: 98 F

## 2023-11-11 DIAGNOSIS — E05.90 HYPERTHYROIDISM: ICD-10-CM

## 2023-11-11 DIAGNOSIS — F19.10 POLYSUBSTANCE ABUSE: ICD-10-CM

## 2023-11-11 DIAGNOSIS — N39.0 URINARY TRACT INFECTION WITHOUT HEMATURIA, SITE UNSPECIFIED: Primary | ICD-10-CM

## 2023-11-11 LAB
ALBUMIN SERPL BCP-MCNC: 3.6 G/DL (ref 3.5–5.2)
ALP SERPL-CCNC: 247 U/L (ref 55–135)
ALT SERPL W/O P-5'-P-CCNC: 64 U/L (ref 10–44)
AMPHET+METHAMPHET UR QL: NEGATIVE
ANION GAP SERPL CALC-SCNC: 8 MMOL/L (ref 8–16)
AST SERPL-CCNC: 39 U/L (ref 10–40)
B-HCG UR QL: NEGATIVE
BACTERIA #/AREA URNS HPF: ABNORMAL /HPF
BARBITURATES UR QL SCN>200 NG/ML: NEGATIVE
BASOPHILS # BLD AUTO: 0.02 K/UL (ref 0–0.2)
BASOPHILS NFR BLD: 0.4 % (ref 0–1.9)
BENZODIAZ UR QL SCN>200 NG/ML: NEGATIVE
BILIRUB SERPL-MCNC: 0.5 MG/DL (ref 0.1–1)
BILIRUB UR QL STRIP: NEGATIVE
BUN SERPL-MCNC: 13 MG/DL (ref 6–20)
BZE UR QL SCN: ABNORMAL
CALCIUM SERPL-MCNC: 9.5 MG/DL (ref 8.7–10.5)
CANNABINOIDS UR QL SCN: ABNORMAL
CHLORIDE SERPL-SCNC: 106 MMOL/L (ref 95–110)
CLARITY UR: ABNORMAL
CO2 SERPL-SCNC: 27 MMOL/L (ref 23–29)
COLOR UR: YELLOW
CREAT SERPL-MCNC: 0.6 MG/DL (ref 0.5–1.4)
CREAT UR-MCNC: 153.5 MG/DL (ref 15–325)
CTP QC/QA: YES
DIFFERENTIAL METHOD: ABNORMAL
EOSINOPHIL # BLD AUTO: 0.2 K/UL (ref 0–0.5)
EOSINOPHIL NFR BLD: 3.1 % (ref 0–8)
ERYTHROCYTE [DISTWIDTH] IN BLOOD BY AUTOMATED COUNT: 14.8 % (ref 11.5–14.5)
EST. GFR  (NO RACE VARIABLE): >60 ML/MIN/1.73 M^2
GLUCOSE SERPL-MCNC: 94 MG/DL (ref 70–110)
GLUCOSE UR QL STRIP: NEGATIVE
HCT VFR BLD AUTO: 39.3 % (ref 37–48.5)
HGB BLD-MCNC: 12.4 G/DL (ref 12–16)
HGB UR QL STRIP: ABNORMAL
HYALINE CASTS #/AREA URNS LPF: 0 /LPF
IMM GRANULOCYTES # BLD AUTO: 0.01 K/UL (ref 0–0.04)
IMM GRANULOCYTES NFR BLD AUTO: 0.2 % (ref 0–0.5)
KETONES UR QL STRIP: NEGATIVE
LEUKOCYTE ESTERASE UR QL STRIP: ABNORMAL
LIPASE SERPL-CCNC: 34 U/L (ref 4–60)
LYMPHOCYTES # BLD AUTO: 2.9 K/UL (ref 1–4.8)
LYMPHOCYTES NFR BLD: 57.3 % (ref 18–48)
MAGNESIUM SERPL-MCNC: 1.7 MG/DL (ref 1.6–2.6)
MCH RBC QN AUTO: 23.4 PG (ref 27–31)
MCHC RBC AUTO-ENTMCNC: 31.6 G/DL (ref 32–36)
MCV RBC AUTO: 74 FL (ref 82–98)
METHADONE UR QL SCN>300 NG/ML: ABNORMAL
MICROSCOPIC COMMENT: ABNORMAL
MOLECULAR STREP A: NEGATIVE
MONOCYTES # BLD AUTO: 0.6 K/UL (ref 0.3–1)
MONOCYTES NFR BLD: 11.8 % (ref 4–15)
NEUTROPHILS # BLD AUTO: 1.4 K/UL (ref 1.8–7.7)
NEUTROPHILS NFR BLD: 27.2 % (ref 38–73)
NITRITE UR QL STRIP: POSITIVE
NRBC BLD-RTO: 0 /100 WBC
OPIATES UR QL SCN: NEGATIVE
PCP UR QL SCN>25 NG/ML: NEGATIVE
PH UR STRIP: 6 [PH] (ref 5–8)
PLATELET # BLD AUTO: 195 K/UL (ref 150–450)
PMV BLD AUTO: 10.7 FL (ref 9.2–12.9)
POC MOLECULAR INFLUENZA A AGN: NEGATIVE
POC MOLECULAR INFLUENZA B AGN: NEGATIVE
POTASSIUM SERPL-SCNC: 3.6 MMOL/L (ref 3.5–5.1)
PROT SERPL-MCNC: 7.6 G/DL (ref 6–8.4)
PROT UR QL STRIP: ABNORMAL
RBC # BLD AUTO: 5.31 M/UL (ref 4–5.4)
RBC #/AREA URNS HPF: >100 /HPF (ref 0–4)
SARS-COV-2 RDRP RESP QL NAA+PROBE: NEGATIVE
SODIUM SERPL-SCNC: 141 MMOL/L (ref 136–145)
SP GR UR STRIP: 1.03 (ref 1–1.03)
T4 FREE SERPL-MCNC: 2.39 NG/DL (ref 0.71–1.51)
TOXICOLOGY INFORMATION: ABNORMAL
TSH SERPL DL<=0.005 MIU/L-ACNC: <0.01 UIU/ML (ref 0.4–4)
URN SPEC COLLECT METH UR: ABNORMAL
UROBILINOGEN UR STRIP-ACNC: NEGATIVE EU/DL
WBC # BLD AUTO: 5.1 K/UL (ref 3.9–12.7)
WBC #/AREA URNS HPF: 9 /HPF (ref 0–5)
WBC CLUMPS URNS QL MICRO: ABNORMAL

## 2023-11-11 PROCEDURE — 84439 ASSAY OF FREE THYROXINE: CPT | Performed by: EMERGENCY MEDICINE

## 2023-11-11 PROCEDURE — 93005 ELECTROCARDIOGRAM TRACING: CPT

## 2023-11-11 PROCEDURE — 81025 URINE PREGNANCY TEST: CPT | Performed by: EMERGENCY MEDICINE

## 2023-11-11 PROCEDURE — 83690 ASSAY OF LIPASE: CPT | Performed by: EMERGENCY MEDICINE

## 2023-11-11 PROCEDURE — 93010 ELECTROCARDIOGRAM REPORT: CPT | Mod: ,,, | Performed by: INTERNAL MEDICINE

## 2023-11-11 PROCEDURE — 85025 COMPLETE CBC W/AUTO DIFF WBC: CPT | Performed by: EMERGENCY MEDICINE

## 2023-11-11 PROCEDURE — 84443 ASSAY THYROID STIM HORMONE: CPT | Performed by: EMERGENCY MEDICINE

## 2023-11-11 PROCEDURE — 87635 SARS-COV-2 COVID-19 AMP PRB: CPT | Performed by: EMERGENCY MEDICINE

## 2023-11-11 PROCEDURE — 81000 URINALYSIS NONAUTO W/SCOPE: CPT | Mod: 59 | Performed by: EMERGENCY MEDICINE

## 2023-11-11 PROCEDURE — 25000003 PHARM REV CODE 250: Performed by: EMERGENCY MEDICINE

## 2023-11-11 PROCEDURE — 93010 EKG 12-LEAD: ICD-10-PCS | Mod: ,,, | Performed by: INTERNAL MEDICINE

## 2023-11-11 PROCEDURE — 80053 COMPREHEN METABOLIC PANEL: CPT | Performed by: EMERGENCY MEDICINE

## 2023-11-11 PROCEDURE — 63600175 PHARM REV CODE 636 W HCPCS: Performed by: EMERGENCY MEDICINE

## 2023-11-11 PROCEDURE — 83735 ASSAY OF MAGNESIUM: CPT | Performed by: EMERGENCY MEDICINE

## 2023-11-11 PROCEDURE — 80307 DRUG TEST PRSMV CHEM ANLYZR: CPT | Performed by: EMERGENCY MEDICINE

## 2023-11-11 RX ORDER — DIPHENHYDRAMINE HYDROCHLORIDE 50 MG/ML
12.5 INJECTION INTRAMUSCULAR; INTRAVENOUS
Status: COMPLETED | OUTPATIENT
Start: 2023-11-11 | End: 2023-11-11

## 2023-11-11 RX ORDER — METOCLOPRAMIDE HYDROCHLORIDE 5 MG/ML
10 INJECTION INTRAMUSCULAR; INTRAVENOUS
Status: COMPLETED | OUTPATIENT
Start: 2023-11-11 | End: 2023-11-11

## 2023-11-11 RX ORDER — PROMETHAZINE HYDROCHLORIDE 25 MG/1
25 TABLET ORAL EVERY 6 HOURS PRN
Qty: 15 TABLET | Refills: 0 | Status: SHIPPED | OUTPATIENT
Start: 2023-11-11

## 2023-11-11 RX ORDER — CEPHALEXIN 500 MG/1
500 CAPSULE ORAL 4 TIMES DAILY
Qty: 40 CAPSULE | Refills: 0 | Status: SHIPPED | OUTPATIENT
Start: 2023-11-11 | End: 2023-11-21

## 2023-11-11 RX ORDER — ONDANSETRON 8 MG/1
8 TABLET, ORALLY DISINTEGRATING ORAL EVERY 8 HOURS PRN
Qty: 20 TABLET | Refills: 0 | Status: SHIPPED | OUTPATIENT
Start: 2023-11-11 | End: 2023-11-11 | Stop reason: ALTCHOICE

## 2023-11-11 RX ORDER — METHIMAZOLE 10 MG/1
10 TABLET ORAL
Qty: 30 TABLET | Refills: 2 | OUTPATIENT
Start: 2023-11-11 | End: 2023-12-11

## 2023-11-11 RX ADMIN — SODIUM CHLORIDE 1000 ML: 9 INJECTION, SOLUTION INTRAVENOUS at 01:11

## 2023-11-11 RX ADMIN — METOCLOPRAMIDE 10 MG: 5 INJECTION, SOLUTION INTRAMUSCULAR; INTRAVENOUS at 01:11

## 2023-11-11 RX ADMIN — CEFTRIAXONE 1 G: 1 INJECTION, POWDER, FOR SOLUTION INTRAMUSCULAR; INTRAVENOUS at 02:11

## 2023-11-11 RX ADMIN — DIPHENHYDRAMINE HYDROCHLORIDE 12.5 MG: 50 INJECTION, SOLUTION INTRAMUSCULAR; INTRAVENOUS at 01:11

## 2023-11-11 NOTE — Clinical Note
"Morgan Maymushtaq Caro was seen and treated in our emergency department on 11/10/2023.  She may return to work on 11/13/2023.       If you have any questions or concerns, please don't hesitate to call.      Lucila DUMONT    "

## 2023-11-11 NOTE — ED PROVIDER NOTES
"Encounter Date: 11/10/2023    SCRIBE #1 NOTE: I, Yovanny Holland, am scribing for, and in the presence of,  Italo Ivory MD. I have scribed the following portions of the note - Other sections scribed: HPI, ROS, PE.       History     Chief Complaint   Patient presents with    Swollen Thyroid     Pt has not had her meds in over a month and states "My thyroid id acting up" pt complaining of thyroid swelling in her neck. Pt ia falling asleep in triage     43 year old female with past medical history of Hypertension, Chronic migraines, and Hyperthyroidism presenting to the Emergency room for evaluation of sore throat, headache, nausea, vomiting, and cough for 2 days. Patient mentions some swelling to her thyroid for 2 months since she has ran out of medications. The patient also reports months of chronic back pain but denies any trauma or injury. Patient reports being pre-menopausal, but states she is on her menstrual period now. She states this is her 2nd menstrual period in 3 years. She does not have an OBGYN. Appointment with PCP in 4 days. Patient admits to tobacco, EtOH, and illicit recreational drug use. She mentions an attempt to withdraw from chronic use of narcotics, with her last using 1 week ago. No other exacerbating or alleviating factors. Medicinal allergy to Ibuprofen. Denies fever. No further complaints at present time.       The history is provided by the patient. No  was used.     Review of patient's allergies indicates:   Allergen Reactions    Ibuprofen Hives and Edema     Past Medical History:   Diagnosis Date    Depression     Migraines     Primary hypertension 03/04/2023    Thyroid disease      No past surgical history on file.  No family history on file.  Social History     Tobacco Use    Smoking status: Every Day     Current packs/day: 2.00     Types: Cigarettes    Smokeless tobacco: Never    Tobacco comments:     1 pack of cigarettes will last the pt 2 days. "   Substance Use Topics    Alcohol use: Yes     Comment: two drinks every 3 days.    Drug use: Yes     Types: Marijuana     Comment: uses marijuasa daily      Review of Systems   Constitutional:  Negative for fever.   HENT:  Positive for sore throat.    Eyes:  Negative for visual disturbance.   Respiratory:  Positive for cough. Negative for shortness of breath.    Cardiovascular:  Negative for chest pain.   Gastrointestinal:  Positive for nausea and vomiting. Negative for abdominal pain.   Genitourinary:  Negative for difficulty urinating.   Musculoskeletal:  Negative for back pain.   Skin:  Negative for rash.   Neurological:  Positive for headaches.       Physical Exam     Initial Vitals [11/10/23 2354]   BP Pulse Resp Temp SpO2   139/77 110 14 98.3 °F (36.8 °C) 98 %      MAP       --         Physical Exam    Nursing note and vitals reviewed.  Constitutional: She appears well-developed and well-nourished. She is not diaphoretic. No distress.   HENT:   Head: Normocephalic and atraumatic.   Eyes: EOM are normal. Pupils are equal, round, and reactive to light.   Neck: Neck supple. No thyromegaly present. No JVD present.   Normal range of motion.  Cardiovascular:  Normal rate, regular rhythm, normal heart sounds and intact distal pulses.     Exam reveals no gallop and no friction rub.       No murmur heard.  Pulmonary/Chest: Breath sounds normal. No respiratory distress. She has no decreased breath sounds. She has no wheezes. She has no rhonchi. She has no rales.   Abdominal: Abdomen is soft. Bowel sounds are normal. She exhibits no distension. There is no abdominal tenderness. There is no rebound and no guarding.   Musculoskeletal:      Cervical back: Normal range of motion and neck supple.     Neurological: She is alert and oriented to person, place, and time. GCS eye subscore is 4. GCS verbal subscore is 5. GCS motor subscore is 6.   Skin: Skin is warm and dry. Capillary refill takes less than 2 seconds.         ED  Course   Procedures  Labs Reviewed   CBC W/ AUTO DIFFERENTIAL - Abnormal; Notable for the following components:       Result Value    MCV 74 (*)     MCH 23.4 (*)     MCHC 31.6 (*)     RDW 14.8 (*)     Gran # (ANC) 1.4 (*)     Gran % 27.2 (*)     Lymph % 57.3 (*)     All other components within normal limits   COMPREHENSIVE METABOLIC PANEL - Abnormal; Notable for the following components:    Alkaline Phosphatase 247 (*)     ALT 64 (*)     All other components within normal limits   URINALYSIS, REFLEX TO URINE CULTURE - Abnormal; Notable for the following components:    Appearance, UA Hazy (*)     Protein, UA 1+ (*)     Occult Blood UA 3+ (*)     Nitrite, UA Positive (*)     Leukocytes, UA Trace (*)     All other components within normal limits    Narrative:     Specimen Source->Urine   DRUG SCREEN PANEL, URINE EMERGENCY - Abnormal; Notable for the following components:    Methadone metabolites Presumptive Positive (*)     Cocaine (Metab.) Presumptive Positive (*)     THC Presumptive Positive (*)     All other components within normal limits    Narrative:     Specimen Source->Urine   TSH - Abnormal; Notable for the following components:    TSH <0.010 (*)     All other components within normal limits   T4, FREE - Abnormal; Notable for the following components:    Free T4 2.39 (*)     All other components within normal limits   URINALYSIS MICROSCOPIC - Abnormal; Notable for the following components:    RBC, UA >100 (*)     WBC, UA 9 (*)     Bacteria Many (*)     All other components within normal limits    Narrative:     Specimen Source->Urine   LIPASE   MAGNESIUM   POCT URINE PREGNANCY   SARS-COV-2 RDRP GENE   POCT INFLUENZA A/B MOLECULAR   POCT STREP A MOLECULAR     EKG Readings: (Independently Interpreted)   Rate of 97, normal sinus rhythm, biatrial enlargement, LVH.  No change from prior EKG.     ECG Results              EKG 12-lead (Final result)  Result time 11/14/23 20:46:32      Final result by Interface, Lab In  [facility] (11/14/23 20:46:32)                   Narrative:    Test Reason : E03.9,    Vent. Rate : 097 BPM     Atrial Rate : 097 BPM     P-R Int : 160 ms          QRS Dur : 090 ms      QT Int : 354 ms       P-R-T Axes : 085 087 081 degrees     QTc Int : 449 ms    Normal sinus rhythm  Biatrial enlargement  LVH  Abnormal ECG  When compared with ECG of 18-AUG-2023 21:00,  No significant change was found  Confirmed by Eliseo Jewell MD ([id number]) on 11/14/2023 8:46:23 PM    Referred By: SANDIE   SELF           Confirmed By:Eliseo Jewell MD                                  Imaging Results    None          Medications   sodium chloride 0.9% bolus 1,000 mL 1,000 mL (0 mLs Intravenous Stopped 11/11/23 0252)   metoclopramide HCl injection 10 mg (10 mg Intravenous Given 11/11/23 0118)   diphenhydrAMINE injection 12.5 mg (12.5 mg Intravenous Given 11/11/23 0117)   cefTRIAXone (ROCEPHIN) 1 g in dextrose 5 % in water (D5W) 100 mL IVPB (MB+) (0 g Intravenous Stopped 11/11/23 0252)     Medical Decision Making  This is an emergent evaluation of a 43 y.o. female who presents with sore throat, headache, nausea, vomiting, and cough for 2 days. The patient was seen and examined. The history and physical exam was obtained. The nursing notes and vital signs were reviewed. .      Amount and/or Complexity of Data Reviewed  Labs: ordered.    Risk  Prescription drug management.     I, Italo Ivory, personally performed the services described in this documentation.  All medical record entries made by the scribe were at my direction and in my presence.  I have reviewed the chart and agree that the record reflects my personal performance and is accurate and complete.          Scribe Attestation:   Scribe #1: I performed the above scribed service and the documentation accurately describes the services I performed. I attest to the accuracy of the note.              NO Evidence of thyrotoxicosis this time.  Normal vital signs.  Patient is  positive for multiple substances including cocaine.  Advised to stop using recreational drugs.  Will restart her methimazole.  Patient is follow up with her doctors.  Patient also inadvertently found to have likely UTI.  Given Rocephin.  Prescribed for Keflex.  Patient stable for discharge.  Patient is tolerating p.o..  No change in voice.  No difficulty breathing.          Clinical Impression:   Final diagnoses:  [N39.0] Urinary tract infection without hematuria, site unspecified (Primary)  [F19.10] Polysubstance abuse  [E05.90] Hyperthyroidism        ED Disposition Condition    Discharge Stable          ED Prescriptions       Medication Sig Dispense Start Date End Date Auth. Provider    methIMAzole (TAPAZOLE) 10 MG Tab Take 1 tablet (10 mg total) by mouth before breakfast. 30 tablet 11/11/2023 12/11/2023 Italo Ivory MD    cephALEXin (KEFLEX) 500 MG capsule Take 1 capsule (500 mg total) by mouth 4 (four) times daily. for 10 days 40 capsule 11/11/2023 11/21/2023 Italo Ivory MD    ondansetron (ZOFRAN-ODT) 8 MG TbDL  (Status: Discontinued) Take 1 tablet (8 mg total) by mouth every 8 (eight) hours as needed (Nausea). 20 tablet 11/11/2023 11/11/2023 Italo Ivory MD    promethazine (PHENERGAN) 25 MG tablet Take 1 tablet (25 mg total) by mouth every 6 (six) hours as needed for Nausea. 15 tablet 11/11/2023 -- Iban Ariza MD          Follow-up Information       Follow up With Specialties Details Why Contact Info    [facility] -  Schedule an appointment as soon as possible for a visit   [address]  [place] [address]  [phone]               Italo Ivory MD  11/15/23 7788

## 2023-12-11 ENCOUNTER — HOSPITAL ENCOUNTER (EMERGENCY)
Facility: HOSPITAL | Age: 43
Discharge: LEFT BEFORE DISCHARGE | End: 2023-12-11
Attending: STUDENT IN AN ORGANIZED HEALTH CARE EDUCATION/TRAINING PROGRAM

## 2023-12-11 VITALS
HEIGHT: 62 IN | BODY MASS INDEX: 21.16 KG/M2 | DIASTOLIC BLOOD PRESSURE: 80 MMHG | SYSTOLIC BLOOD PRESSURE: 129 MMHG | HEART RATE: 109 BPM | RESPIRATION RATE: 20 BRPM | WEIGHT: 115 LBS | TEMPERATURE: 98 F | OXYGEN SATURATION: 98 %

## 2023-12-11 DIAGNOSIS — R00.0 TACHYCARDIA: ICD-10-CM

## 2023-12-11 DIAGNOSIS — E05.90 HYPERTHYROIDISM: Primary | ICD-10-CM

## 2023-12-11 LAB
ALBUMIN SERPL BCP-MCNC: 3.5 G/DL (ref 3.5–5.2)
ALP SERPL-CCNC: 179 U/L (ref 55–135)
ALT SERPL W/O P-5'-P-CCNC: 33 U/L (ref 10–44)
ANION GAP SERPL CALC-SCNC: 10 MMOL/L (ref 8–16)
AST SERPL-CCNC: 22 U/L (ref 10–40)
BASOPHILS # BLD AUTO: 0.03 K/UL (ref 0–0.2)
BASOPHILS NFR BLD: 0.5 % (ref 0–1.9)
BILIRUB SERPL-MCNC: 0.8 MG/DL (ref 0.1–1)
BNP SERPL-MCNC: 38 PG/ML (ref 0–99)
BUN SERPL-MCNC: 9 MG/DL (ref 6–20)
CALCIUM SERPL-MCNC: 9.4 MG/DL (ref 8.7–10.5)
CHLORIDE SERPL-SCNC: 103 MMOL/L (ref 95–110)
CO2 SERPL-SCNC: 28 MMOL/L (ref 23–29)
CREAT SERPL-MCNC: 0.6 MG/DL (ref 0.5–1.4)
DIFFERENTIAL METHOD: ABNORMAL
EOSINOPHIL # BLD AUTO: 0.1 K/UL (ref 0–0.5)
EOSINOPHIL NFR BLD: 2.2 % (ref 0–8)
ERYTHROCYTE [DISTWIDTH] IN BLOOD BY AUTOMATED COUNT: 14.2 % (ref 11.5–14.5)
EST. GFR  (NO RACE VARIABLE): >60 ML/MIN/1.73 M^2
GLUCOSE SERPL-MCNC: 59 MG/DL (ref 70–110)
HCT VFR BLD AUTO: 40.8 % (ref 37–48.5)
HGB BLD-MCNC: 13 G/DL (ref 12–16)
IMM GRANULOCYTES # BLD AUTO: 0.01 K/UL (ref 0–0.04)
IMM GRANULOCYTES NFR BLD AUTO: 0.2 % (ref 0–0.5)
LYMPHOCYTES # BLD AUTO: 3.2 K/UL (ref 1–4.8)
LYMPHOCYTES NFR BLD: 54 % (ref 18–48)
MAGNESIUM SERPL-MCNC: 1.6 MG/DL (ref 1.6–2.6)
MCH RBC QN AUTO: 23.1 PG (ref 27–31)
MCHC RBC AUTO-ENTMCNC: 31.9 G/DL (ref 32–36)
MCV RBC AUTO: 73 FL (ref 82–98)
MONOCYTES # BLD AUTO: 0.5 K/UL (ref 0.3–1)
MONOCYTES NFR BLD: 8.8 % (ref 4–15)
NEUTROPHILS # BLD AUTO: 2 K/UL (ref 1.8–7.7)
NEUTROPHILS NFR BLD: 34.3 % (ref 38–73)
NRBC BLD-RTO: 0 /100 WBC
PLATELET # BLD AUTO: 216 K/UL (ref 150–450)
PMV BLD AUTO: 10.1 FL (ref 9.2–12.9)
POCT GLUCOSE: 90 MG/DL (ref 70–110)
POTASSIUM SERPL-SCNC: 3.4 MMOL/L (ref 3.5–5.1)
PROT SERPL-MCNC: 7.7 G/DL (ref 6–8.4)
RBC # BLD AUTO: 5.62 M/UL (ref 4–5.4)
SODIUM SERPL-SCNC: 141 MMOL/L (ref 136–145)
T4 FREE SERPL-MCNC: 2.19 NG/DL (ref 0.71–1.51)
TROPONIN I SERPL DL<=0.01 NG/ML-MCNC: <0.006 NG/ML (ref 0–0.03)
TSH SERPL DL<=0.005 MIU/L-ACNC: <0.01 UIU/ML (ref 0.4–4)
WBC # BLD AUTO: 5.94 K/UL (ref 3.9–12.7)

## 2023-12-11 PROCEDURE — 85025 COMPLETE CBC W/AUTO DIFF WBC: CPT

## 2023-12-11 PROCEDURE — 96374 THER/PROPH/DIAG INJ IV PUSH: CPT

## 2023-12-11 PROCEDURE — 84439 ASSAY OF FREE THYROXINE: CPT

## 2023-12-11 PROCEDURE — 84484 ASSAY OF TROPONIN QUANT: CPT

## 2023-12-11 PROCEDURE — 93010 ELECTROCARDIOGRAM REPORT: CPT | Mod: ,,, | Performed by: INTERNAL MEDICINE

## 2023-12-11 PROCEDURE — 80053 COMPREHEN METABOLIC PANEL: CPT

## 2023-12-11 PROCEDURE — 82962 GLUCOSE BLOOD TEST: CPT

## 2023-12-11 PROCEDURE — 99284 EMERGENCY DEPT VISIT MOD MDM: CPT | Mod: 25

## 2023-12-11 PROCEDURE — 84443 ASSAY THYROID STIM HORMONE: CPT

## 2023-12-11 PROCEDURE — 93005 ELECTROCARDIOGRAM TRACING: CPT

## 2023-12-11 PROCEDURE — 96361 HYDRATE IV INFUSION ADD-ON: CPT

## 2023-12-11 PROCEDURE — 83880 ASSAY OF NATRIURETIC PEPTIDE: CPT

## 2023-12-11 PROCEDURE — 83735 ASSAY OF MAGNESIUM: CPT

## 2023-12-11 PROCEDURE — 25000003 PHARM REV CODE 250: Performed by: STUDENT IN AN ORGANIZED HEALTH CARE EDUCATION/TRAINING PROGRAM

## 2023-12-11 PROCEDURE — 63600175 PHARM REV CODE 636 W HCPCS: Performed by: STUDENT IN AN ORGANIZED HEALTH CARE EDUCATION/TRAINING PROGRAM

## 2023-12-11 PROCEDURE — 93010 EKG 12-LEAD: ICD-10-PCS | Mod: ,,, | Performed by: INTERNAL MEDICINE

## 2023-12-11 RX ORDER — METHIMAZOLE 5 MG/1
10 TABLET ORAL ONCE
Status: DISCONTINUED | OUTPATIENT
Start: 2023-12-11 | End: 2023-12-11

## 2023-12-11 RX ORDER — DEXAMETHASONE SODIUM PHOSPHATE 4 MG/ML
8 INJECTION, SOLUTION INTRA-ARTICULAR; INTRALESIONAL; INTRAMUSCULAR; INTRAVENOUS; SOFT TISSUE
Status: COMPLETED | OUTPATIENT
Start: 2023-12-11 | End: 2023-12-11

## 2023-12-11 RX ORDER — METHIMAZOLE 10 MG/1
10 TABLET ORAL 3 TIMES DAILY
Qty: 42 TABLET | Refills: 0 | Status: SHIPPED | OUTPATIENT
Start: 2023-12-11 | End: 2023-12-25

## 2023-12-11 RX ORDER — DEXAMETHASONE 4 MG/1
4 TABLET ORAL EVERY 12 HOURS
Qty: 10 TABLET | Refills: 0 | Status: SHIPPED | OUTPATIENT
Start: 2023-12-11 | End: 2023-12-16

## 2023-12-11 RX ORDER — PROPRANOLOL HYDROCHLORIDE 40 MG/1
40 TABLET ORAL 3 TIMES DAILY
Qty: 42 TABLET | Refills: 0 | Status: SHIPPED | OUTPATIENT
Start: 2023-12-11 | End: 2023-12-25

## 2023-12-11 RX ORDER — METHIMAZOLE 5 MG/1
20 TABLET ORAL ONCE
Status: COMPLETED | OUTPATIENT
Start: 2023-12-11 | End: 2023-12-11

## 2023-12-11 RX ORDER — PROPRANOLOL HYDROCHLORIDE 10 MG/1
20 TABLET ORAL
Status: DISCONTINUED | OUTPATIENT
Start: 2023-12-11 | End: 2023-12-11

## 2023-12-11 RX ORDER — ONDANSETRON 2 MG/ML
4 INJECTION INTRAMUSCULAR; INTRAVENOUS
Status: DISCONTINUED | OUTPATIENT
Start: 2023-12-11 | End: 2023-12-11 | Stop reason: HOSPADM

## 2023-12-11 RX ADMIN — METHIMAZOLE 20 MG: 5 TABLET ORAL at 09:12

## 2023-12-11 RX ADMIN — DEXAMETHASONE SODIUM PHOSPHATE 8 MG: 4 INJECTION INTRA-ARTICULAR; INTRALESIONAL; INTRAMUSCULAR; INTRAVENOUS; SOFT TISSUE at 09:12

## 2023-12-11 RX ADMIN — SODIUM CHLORIDE 1000 ML: 9 INJECTION, SOLUTION INTRAVENOUS at 07:12

## 2023-12-11 RX ADMIN — PROPRANOLOL HYDROCHLORIDE 60 MG: 40 TABLET ORAL at 09:12

## 2023-12-12 NOTE — ED NOTES
Morgan Caro, a 43 y.o. female presents to the ED w/ complaint of headache, thyroid concerns, back pain, poor appetite, and nausea. Patient reports that she hasn't taken her medication for hyperthyroid for over a month. Patient is AAOx4, VSS, NAD. Denies CP, SOB, diarrhea, cough, fever, numbness, or tingling. Bed locked, in lowest position, bed rails up x2, call light in reach, all monitoring attached.

## 2023-12-12 NOTE — ED PROVIDER NOTES
Encounter Date: 12/11/2023       History     Chief Complaint   Patient presents with    multiple compliants     Patient reports feels like her thyroid is swollen, hasn't taken medication in greater than a month, back pain, poor appetite, and nausea.      43-year-old female with history of untreated hyperthyroidism presents now for multiple complaints.  She feels like her thyroid is swollen, she reports she has not taken medication in over a month, she is decreased appetite, nausea, anxiety, and does not want to be here.  She does not want to spend the night in the hospital.  She denies nausea or vomiting.  She does report palpitations but denies shortness of breath, chest pain, fevers or chills.  She simply wants refills of her medication.      Review of patient's allergies indicates:   Allergen Reactions    Ibuprofen Hives and Edema     Past Medical History:   Diagnosis Date    Depression     Migraines     Primary hypertension 03/04/2023    Thyroid disease      History reviewed. No pertinent surgical history.  History reviewed. No pertinent family history.  Social History     Tobacco Use    Smoking status: Every Day     Current packs/day: 2.00     Types: Cigarettes    Smokeless tobacco: Never    Tobacco comments:     1 pack of cigarettes will last the pt 2 days.   Substance Use Topics    Alcohol use: Yes     Comment: two drinks every 3 days.    Drug use: Yes     Types: Marijuana     Comment: uses marijuasa daily      Review of Systems   Constitutional:  Negative for activity change, appetite change, chills, fever and unexpected weight change.   HENT:  Negative for dental problem, drooling, sore throat and trouble swallowing.    Eyes:  Negative for discharge and itching.   Respiratory:  Negative for cough, chest tightness, shortness of breath, wheezing and stridor.    Cardiovascular:  Positive for palpitations. Negative for chest pain and leg swelling.   Gastrointestinal:  Negative for abdominal distention,  abdominal pain, diarrhea and nausea.   Genitourinary:  Negative for difficulty urinating, dysuria, frequency and urgency.   Musculoskeletal:  Positive for arthralgias and back pain. Negative for gait problem and joint swelling.   Neurological:  Negative for dizziness, syncope, numbness and headaches.   Psychiatric/Behavioral:  Negative for agitation, behavioral problems and confusion. The patient is nervous/anxious.        Physical Exam     Initial Vitals [12/11/23 1757]   BP Pulse Resp Temp SpO2   138/81 (!) 118 18 97.6 °F (36.4 °C) 96 %      MAP       --         Physical Exam    Nursing note and vitals reviewed.  Constitutional: She appears well-developed and well-nourished. She is not diaphoretic.   Anxious, irritated female   HENT:   Head: Normocephalic and atraumatic.   Mouth/Throat: Oropharynx is clear and moist.   Eyes: EOM are normal. Pupils are equal, round, and reactive to light. Right eye exhibits no discharge. Left eye exhibits no discharge.   Mildly proptotic   Neck: No tracheal deviation present.   Anterior thyroid mass   Normal range of motion.  Cardiovascular:  Regular rhythm and intact distal pulses.           tachycardia   Pulmonary/Chest: No respiratory distress. She has no wheezes. She exhibits no tenderness.   Abdominal: Abdomen is soft. She exhibits no distension. There is no abdominal tenderness.   Musculoskeletal:         General: No tenderness or edema. Normal range of motion.      Cervical back: Normal range of motion.     Neurological: She is alert and oriented to person, place, and time. She has normal strength. No cranial nerve deficit or sensory deficit. GCS eye subscore is 4. GCS verbal subscore is 5. GCS motor subscore is 6.   Skin: Skin is warm and dry. No rash noted.   Psychiatric: Her behavior is normal. Thought content normal.   Anxious         ED Course   Procedures  Labs Reviewed   CBC W/ AUTO DIFFERENTIAL - Abnormal; Notable for the following components:       Result Value     RBC 5.62 (*)     MCV 73 (*)     MCH 23.1 (*)     MCHC 31.9 (*)     Gran % 34.3 (*)     Lymph % 54.0 (*)     All other components within normal limits   COMPREHENSIVE METABOLIC PANEL - Abnormal; Notable for the following components:    Potassium 3.4 (*)     Glucose 59 (*)     Alkaline Phosphatase 179 (*)     All other components within normal limits   TSH - Abnormal; Notable for the following components:    TSH <0.010 (*)     All other components within normal limits   T4, FREE - Abnormal; Notable for the following components:    Free T4 2.19 (*)     All other components within normal limits   MAGNESIUM   TROPONIN I   B-TYPE NATRIURETIC PEPTIDE   B-TYPE NATRIURETIC PEPTIDE   POCT GLUCOSE          Imaging Results    None          Medications   sodium chloride 0.9% bolus 1,000 mL 1,000 mL (0 mLs Intravenous Stopped 12/11/23 2026)   dexAMETHasone injection 8 mg (8 mg Intravenous Given 12/11/23 2130)   methIMAzole tablet 20 mg (20 mg Oral Given 12/11/23 2148)   propranoloL tablet 60 mg (60 mg Oral Given 12/11/23 2128)     Medical Decision Making  Differential diagnosis includes hyperthyroidism, thyrotoxicosis, thyroid storm, drug or alcohol abuse, alcohol withdrawal, electrolyte abnormality, adrenal insufficiency, underlying infection, medication noncompliance    Amount and/or Complexity of Data Reviewed  Labs: ordered. Decision-making details documented in ED Course.    Risk  Prescription drug management.               ED Course as of 12/12/23 0253   Mon Dec 11, 2023   1834 Attempted to call patient's phone number, [phone], that's listed, but it has been disconnected. [BS]   1835 Attempted to call patient's daughter, Shana, at [phone] and there was no answer and no way to leave a message [BS]   1837 Attempted to call patient's son, , at [phone] but there's no answer and no voice mail available.    [BS]   2123 Patient does not want to stay in the hospital at this time.  She is refusing medical  treatment aside from the medication I give her here.  [BS]   2124 Comprehensive metabolic panel(!)  Patient has mild hypokalemia and mild hypoglycemia concerning for impending adrenal insufficiency in the setting of thyrotoxicosis.  Will start patient on dexamethasone in addition to propranolol and methimazole for workup concerning for thyrotoxicosis [BS]   2124 TSH(!) [BS]   2124 T4, Free(!)  Thyroid studies consistent with poorly-controlled hyperthyroidism.  Patient reports she has not taken her medication because she was kicked out of her house and her things were thrown away [BS]   2125 BNP  No evidence of high output heart failure at this time [BS]   2125 Pulse(!): 118 [BS]   2125 Pulse: 109  Patient remains consistently tachycardic in spite of IV fluids [BS]   2125 CBC auto differential(!)  No evidence of leukocytosis to suggest underlying infection is cause of hyperthyroidism. [BS]   Tue Dec 12, 2023   0252 Due to not wanting to stay in the hospital patient decided to leave Against Medical Advice (AMA).    I saw the patient and I discussed the risks of AMA. The patient has decision making capacity and was able to verbalize to me understood the risks of not being seen today: including but not limited to threat to life, limb and permanent disability, thyroid storm, sudden cardiac death, arrhythmia.    In absence of receiving treatment here under close observation here in the emergency department, I have tried to arrange the following:  Steroids, methimazole, propranolol medication for impending thyroid storm , close outpatient follow-up with primary care physician next week.     I discussed further follow up options for the patient and reminded them that they should return to the ED with any new or worsening symptoms.They verbalized their understanding back to me and know they can return at any time, including as soon as they leave the department now.     They were given the opportunity to ask questions, which  were reasonably addressed to the best of my ability and their apparent satisfaction.     [BS]      ED Course User Index  [BS] Kareem Eckert MD                           Clinical Impression:  Final diagnoses:  [R00.0] Tachycardia  [E05.90] Hyperthyroidism (Primary)          ED Disposition Condition    AMA Stable                Kareem Eckert MD  12/12/23 0253

## 2023-12-12 NOTE — DISCHARGE INSTRUCTIONS

## 2024-01-09 ENCOUNTER — HOSPITAL ENCOUNTER (EMERGENCY)
Facility: HOSPITAL | Age: 44
Discharge: HOME | End: 2024-01-10
Attending: EMERGENCY MEDICINE

## 2024-01-09 DIAGNOSIS — N39.0 URINARY TRACT INFECTION WITH HEMATURIA, SITE UNSPECIFIED: Primary | ICD-10-CM

## 2024-01-09 DIAGNOSIS — M79.652 LEFT THIGH PAIN: ICD-10-CM

## 2024-01-09 DIAGNOSIS — R31.9 URINARY TRACT INFECTION WITH HEMATURIA, SITE UNSPECIFIED: Primary | ICD-10-CM

## 2024-01-09 DIAGNOSIS — M25.552 LEFT HIP PAIN: ICD-10-CM

## 2024-01-09 LAB
B-HCG UR QL: NEGATIVE
BACTERIA #/AREA URNS HPF: ABNORMAL /HPF
BILIRUB UR QL STRIP: NEGATIVE
CLARITY UR: CLEAR
COLOR UR: YELLOW
CTP QC/QA: YES
GLUCOSE UR QL STRIP: NEGATIVE
HGB UR QL STRIP: ABNORMAL
HYALINE CASTS #/AREA URNS LPF: 12 /LPF
KETONES UR QL STRIP: ABNORMAL
LEUKOCYTE ESTERASE UR QL STRIP: ABNORMAL
MICROSCOPIC COMMENT: ABNORMAL
NITRITE UR QL STRIP: NEGATIVE
PH UR STRIP: 6 [PH] (ref 5–8)
PROT UR QL STRIP: ABNORMAL
RBC #/AREA URNS HPF: 2 /HPF (ref 0–4)
SP GR UR STRIP: 1.03 (ref 1–1.03)
SQUAMOUS #/AREA URNS HPF: 7 /HPF
URN SPEC COLLECT METH UR: ABNORMAL
UROBILINOGEN UR STRIP-ACNC: ABNORMAL EU/DL
WBC #/AREA URNS HPF: 11 /HPF (ref 0–5)

## 2024-01-09 PROCEDURE — 25000003 PHARM REV CODE 250: Performed by: NURSE PRACTITIONER

## 2024-01-09 PROCEDURE — 81000 URINALYSIS NONAUTO W/SCOPE: CPT | Performed by: NURSE PRACTITIONER

## 2024-01-09 PROCEDURE — 87086 URINE CULTURE/COLONY COUNT: CPT | Performed by: NURSE PRACTITIONER

## 2024-01-09 PROCEDURE — 87491 CHLMYD TRACH DNA AMP PROBE: CPT | Performed by: NURSE PRACTITIONER

## 2024-01-09 PROCEDURE — 99284 EMERGENCY DEPT VISIT MOD MDM: CPT

## 2024-01-09 PROCEDURE — 81025 URINE PREGNANCY TEST: CPT | Performed by: EMERGENCY MEDICINE

## 2024-01-09 RX ORDER — CEPHALEXIN 500 MG/1
500 CAPSULE ORAL EVERY 12 HOURS
Qty: 14 CAPSULE | Refills: 0 | Status: SHIPPED | OUTPATIENT
Start: 2024-01-09 | End: 2024-01-16

## 2024-01-09 RX ORDER — PROMETHAZINE HYDROCHLORIDE 25 MG/1
25 TABLET ORAL EVERY 6 HOURS PRN
Qty: 15 TABLET | Refills: 0 | Status: SHIPPED | OUTPATIENT
Start: 2024-01-09

## 2024-01-09 RX ORDER — CYCLOBENZAPRINE HCL 5 MG
5 TABLET ORAL EVERY 8 HOURS PRN
Qty: 15 TABLET | Refills: 0 | Status: SHIPPED | OUTPATIENT
Start: 2024-01-09

## 2024-01-09 RX ORDER — OXYCODONE AND ACETAMINOPHEN 5; 325 MG/1; MG/1
1 TABLET ORAL
Status: COMPLETED | OUTPATIENT
Start: 2024-01-09 | End: 2024-01-09

## 2024-01-09 RX ADMIN — OXYCODONE AND ACETAMINOPHEN 1 TABLET: 5; 325 TABLET ORAL at 10:01

## 2024-01-09 NOTE — Clinical Note
"Morgan Hooper" Gordo was seen and treated in our emergency department on 1/9/2024.  She may return with no restrictions on 01/12/2024.       Sincerely,      Alexander Turcios NP    "

## 2024-01-10 VITALS
RESPIRATION RATE: 18 BRPM | DIASTOLIC BLOOD PRESSURE: 74 MMHG | HEART RATE: 87 BPM | WEIGHT: 140 LBS | SYSTOLIC BLOOD PRESSURE: 136 MMHG | TEMPERATURE: 98 F | OXYGEN SATURATION: 100 % | HEIGHT: 62 IN | BODY MASS INDEX: 25.76 KG/M2

## 2024-01-10 NOTE — ED PROVIDER NOTES
Encounter Date: 1/9/2024       History     Chief Complaint   Patient presents with    multiple complaints     PT wit headache x 3 days and left hip x 2 weeks without trauma.     43-year-old female with a history of hypertension, cardiomegaly, tobacco abuse, migraines, opioid abuse, hyperthyroidism, and others presenting for evaluation of left hip pain for the past 2 weeks.  Also states that she is experiencing UTI symptoms of frequency, urgency, and dysuria for the past 2-3 days.  Reports that she does not actually have a headache as claimed at triage but did not want to say that she was worried about a UTI in front of so many strangers.  She denies cough, congestion, fever, abdominal pain, flank pain, chest pain, shortness of breath, or any others.  She does report some nausea and vomiting but states that these are chronic and are relieved by Phenergan.  Reports that she treated left hip pain with her mother's Flexeril which temporarily completely relieved her symptoms.    The history is provided by the patient. No  was used.     Review of patient's allergies indicates:   Allergen Reactions    Ibuprofen Hives and Edema     Past Medical History:   Diagnosis Date    Depression     Migraines     Primary hypertension 03/04/2023    Thyroid disease      History reviewed. No pertinent surgical history.  No family history on file.  Social History     Tobacco Use    Smoking status: Every Day     Current packs/day: 2.00     Types: Cigarettes    Smokeless tobacco: Never    Tobacco comments:     1 pack of cigarettes will last the pt 2 days.   Substance Use Topics    Alcohol use: Yes     Comment: two drinks every 3 days.    Drug use: Yes     Types: Marijuana     Comment: uses marijuasa daily      Review of Systems   Constitutional:  Negative for chills, fatigue and fever.   HENT:  Negative for congestion, ear pain, nosebleeds, postnasal drip, rhinorrhea, sinus pressure and sore throat.    Eyes:  Negative for  photophobia and pain.   Respiratory:  Negative for apnea, cough, choking, chest tightness, shortness of breath, wheezing and stridor.    Cardiovascular:  Negative for chest pain, palpitations and leg swelling.   Gastrointestinal:  Negative for abdominal pain, constipation, diarrhea, nausea and vomiting.   Genitourinary:  Positive for dysuria, frequency and urgency. Negative for flank pain, hematuria, pelvic pain and vaginal discharge.   Musculoskeletal:  Positive for arthralgias (Left hip). Negative for back pain, gait problem and neck pain.   Skin:  Negative for color change, pallor, rash and wound.   Neurological:  Negative for dizziness, seizures, syncope, facial asymmetry, light-headedness and headaches.   Hematological:  Negative for adenopathy.   Psychiatric/Behavioral:  Negative for confusion. The patient is not nervous/anxious.        Physical Exam     Initial Vitals [01/09/24 2148]   BP Pulse Resp Temp SpO2   (!) 145/88 99 16 98.7 °F (37.1 °C) 96 %      MAP       --         Physical Exam    Nursing note and vitals reviewed.  Constitutional: She appears well-developed and well-nourished. She is not diaphoretic. No distress.   HENT:   Head: Normocephalic and atraumatic.   Right Ear: External ear normal.   Left Ear: External ear normal.   Nose: Nose normal.   Eyes: Conjunctivae and EOM are normal. Right eye exhibits no discharge. Left eye exhibits no discharge.   Neck: Neck supple. No tracheal deviation present.   Normal range of motion.  Cardiovascular:  Normal rate.           Pulmonary/Chest: No stridor. No respiratory distress.   Abdominal: Abdomen is soft. She exhibits no distension. There is no abdominal tenderness.   Musculoskeletal:         General: No tenderness. Normal range of motion.      Cervical back: Normal range of motion and neck supple.     Neurological: She is alert and oriented to person, place, and time. She has normal strength. No cranial nerve deficit or sensory deficit.   Skin: Skin is  warm and dry.   Psychiatric: She has a normal mood and affect. Her behavior is normal. Judgment and thought content normal.         ED Course   Procedures  Labs Reviewed   URINALYSIS, REFLEX TO URINE CULTURE - Abnormal; Notable for the following components:       Result Value    Protein, UA 1+ (*)     Ketones, UA Trace (*)     Occult Blood UA 1+ (*)     Urobilinogen, UA 2.0-3.0 (*)     Leukocytes, UA 3+ (*)     All other components within normal limits    Narrative:     Specimen Source->Urine   URINALYSIS MICROSCOPIC - Abnormal; Notable for the following components:    WBC, UA 11 (*)     Hyaline Casts, UA 12 (*)     All other components within normal limits    Narrative:     Specimen Source->Urine   CULTURE, URINE   C. TRACHOMATIS/N. GONORRHOEAE BY AMP DNA   POCT URINE PREGNANCY   SARS-COV-2 RDRP GENE          Imaging Results              X-Ray Hip 2 or 3 views Left (with Pelvis when performed) (Final result)  Result time 01/09/24 23:36:51      Final result by Kit Naqvi MD (01/09/24 23:36:51)                   Impression:      No acute displaced fracture seen.      Electronically signed by: iKt Naqvi MD  Date:    01/09/2024  Time:    23:36               Narrative:    EXAMINATION:  XR HIP WITH PELVIS WHEN PERFORMED, 2 OR 3 VIEWS LEFT    CLINICAL HISTORY:  Pain in left hip    TECHNIQUE:  AP view of the pelvis and frog leg lateral view of the left hip were performed.    COMPARISON:  None    FINDINGS:  No evidence of acute displaced fracture, dislocation, or osseous destructive process.  Hip joint spaces appear fairly well maintained with pincer type appearance of the acetabula bilaterally.                                       Medications   oxyCODONE-acetaminophen 5-325 mg per tablet 1 tablet (1 tablet Oral Given 1/9/24 1734)     Medical Decision Making  Urinalysis with evidence of possible infection.  Given that patient is symptomatic I will treat with antibiotics.  Urine culture in process.  X-ray of  the left hip without evidence of acute abnormality.  Will prescribe Flexeril given that the patient states it has been helping with her pain.  No evidence of sepsis or other emergent pathology at this time.  Follow up with PCP.  ED return precautions given.  Shared decision-making with the patient.    Amount and/or Complexity of Data Reviewed  External Data Reviewed: labs and notes.  Labs: ordered. Decision-making details documented in ED Course.  Radiology: ordered. Decision-making details documented in ED Course.    Risk  Prescription drug management.                                      Clinical Impression:  Final diagnoses:  [M25.552] Left hip pain  [M79.652] Left thigh pain  [N39.0, R31.9] Urinary tract infection with hematuria, site unspecified (Primary)          ED Disposition Condition    Discharge Stable          ED Prescriptions       Medication Sig Dispense Start Date End Date Auth. Provider    cephALEXin (KEFLEX) 500 MG capsule Take 1 capsule (500 mg total) by mouth every 12 (twelve) hours. for 7 days 14 capsule 1/9/2024 1/16/2024 Alexander Turcios, DANIEL    cyclobenzaprine (FLEXERIL) 5 MG tablet Take 1 tablet (5 mg total) by mouth every 8 (eight) hours as needed for Muscle spasms. 15 tablet 1/9/2024 -- Alexander Tucrios NP    promethazine (PHENERGAN) 25 MG tablet Take 1 tablet (25 mg total) by mouth every 6 (six) hours as needed for Nausea. 15 tablet 1/9/2024 -- Alexander Turcios, DANIEL          Follow-up Information       Follow up With Specialties Details Why Contact Info    [facility] -  Schedule an appointment as soon as possible for a visit in 1 week For further evaluation [address] [place] [address]  [phone]      [facility] Emergency [facility] Emergency Medicine Go to  If symptoms worsen, As needed [address]  [facility] [place] [id number] [phone]             Alexander Turcios, DANIEL  01/09/24 5989

## 2024-01-10 NOTE — ED TRIAGE NOTES
Pt presents to ED c/o headache x 3 days and left hip pain x 2 weeks.  Denies any other symptoms.

## 2024-01-10 NOTE — DISCHARGE INSTRUCTIONS
Take antibiotics as prescribed until they are gone.  You should not have any left over even if you feel better.    Follow-up with your regular doctor.  Return to the emergency department for any new or worsening symptoms.    Thank you for coming to our Emergency Department today. It is important to remember that some problems are difficult to diagnose and may not be found during your first visit. Be sure to follow up with your primary care doctor.  If you do not have one, you may contact the one listed on your discharge paperwork or you may also call the [facility] Appointment Desk at [phone] to schedule an appointment with one.     Return to the ER with any questions/concerns, new/concerning symptoms, worsening or failure to improve. Do not drive or make any important decisions for 24 hours if you have received any pain medications, sedatives or mood altering drugs during your ER visit.

## 2024-01-11 LAB
BACTERIA UR CULT: NORMAL
C TRACH DNA SPEC QL NAA+PROBE: NOT DETECTED
N GONORRHOEA DNA SPEC QL NAA+PROBE: NOT DETECTED

## 2025-04-15 ENCOUNTER — HOSPITAL ENCOUNTER (EMERGENCY)
Facility: HOSPITAL | Age: 45
Discharge: LEFT BEFORE DISCHARGE | End: 2025-04-15
Attending: EMERGENCY MEDICINE
Payer: MEDICAID

## 2025-04-15 VITALS
TEMPERATURE: 99 F | BODY MASS INDEX: 22.08 KG/M2 | HEIGHT: 62 IN | SYSTOLIC BLOOD PRESSURE: 142 MMHG | OXYGEN SATURATION: 96 % | HEART RATE: 129 BPM | RESPIRATION RATE: 18 BRPM | WEIGHT: 120 LBS | DIASTOLIC BLOOD PRESSURE: 77 MMHG

## 2025-04-15 DIAGNOSIS — R11.2 NAUSEA AND VOMITING, UNSPECIFIED VOMITING TYPE: Primary | ICD-10-CM

## 2025-04-15 DIAGNOSIS — R00.0 TACHYCARDIA: ICD-10-CM

## 2025-04-15 LAB
AMPHET UR QL SCN: NEGATIVE
B-HCG UR QL: NEGATIVE
BACTERIA #/AREA URNS AUTO: ABNORMAL /HPF
BACTERIA GENITAL QL WET PREP: ABNORMAL
BARBITURATE SCN PRESENT UR: NEGATIVE
BENZODIAZ UR QL SCN: NEGATIVE
BILIRUB UR QL STRIP.AUTO: NEGATIVE
CANNABINOIDS UR QL SCN: ABNORMAL
CLARITY UR: CLEAR
CLUE CELLS VAG QL WET PREP: ABNORMAL
COCAINE UR QL SCN: NEGATIVE
COLOR UR AUTO: YELLOW
CREAT UR-MCNC: 112.3 MG/DL (ref 15–325)
CTP QC/QA: YES
FILAMENT FUNGI VAG WET PREP-#/AREA: ABNORMAL
GLUCOSE UR QL STRIP: NEGATIVE
HGB UR QL STRIP: NEGATIVE
HOLD SPECIMEN: NORMAL
KETONES UR QL STRIP: NEGATIVE
LEUKOCYTE ESTERASE UR QL STRIP: ABNORMAL
METHADONE UR QL SCN: NEGATIVE
MICROSCOPIC COMMENT: ABNORMAL
NITRITE UR QL STRIP: NEGATIVE
OPIATES UR QL SCN: NEGATIVE
PCP UR QL: NEGATIVE
PH UR STRIP: 6 [PH]
PROT UR QL STRIP: ABNORMAL
RBC #/AREA URNS AUTO: 2 /HPF (ref 0–4)
SP GR UR STRIP: 1.02
T VAGINALIS GENITAL QL WET PREP: ABNORMAL
UROBILINOGEN UR STRIP-ACNC: NEGATIVE EU/DL
WBC #/AREA URNS AUTO: 42 /HPF (ref 0–5)
WBC #/AREA VAG WET PREP: ABNORMAL
YEAST GENITAL QL WET PREP: ABNORMAL

## 2025-04-15 PROCEDURE — 87186 SC STD MICRODIL/AGAR DIL: CPT

## 2025-04-15 PROCEDURE — 93010 ELECTROCARDIOGRAM REPORT: CPT | Mod: ,,, | Performed by: INTERNAL MEDICINE

## 2025-04-15 PROCEDURE — 87491 CHLMYD TRACH DNA AMP PROBE: CPT

## 2025-04-15 PROCEDURE — 81025 URINE PREGNANCY TEST: CPT

## 2025-04-15 PROCEDURE — 81001 URINALYSIS AUTO W/SCOPE: CPT

## 2025-04-15 PROCEDURE — 99283 EMERGENCY DEPT VISIT LOW MDM: CPT | Mod: 25

## 2025-04-15 PROCEDURE — 93005 ELECTROCARDIOGRAM TRACING: CPT

## 2025-04-15 PROCEDURE — 87210 SMEAR WET MOUNT SALINE/INK: CPT

## 2025-04-15 PROCEDURE — 25000003 PHARM REV CODE 250

## 2025-04-15 PROCEDURE — 80307 DRUG TEST PRSMV CHEM ANLYZR: CPT

## 2025-04-15 RX ORDER — ONDANSETRON 8 MG/1
8 TABLET, ORALLY DISINTEGRATING ORAL
Status: COMPLETED | OUTPATIENT
Start: 2025-04-15 | End: 2025-04-15

## 2025-04-15 RX ORDER — ACETAMINOPHEN 325 MG/1
650 TABLET ORAL
Status: COMPLETED | OUTPATIENT
Start: 2025-04-15 | End: 2025-04-15

## 2025-04-15 RX ORDER — ONDANSETRON 4 MG/1
8 TABLET, ORALLY DISINTEGRATING ORAL 2 TIMES DAILY
Qty: 12 TABLET | Refills: 0 | Status: SHIPPED | OUTPATIENT
Start: 2025-04-15

## 2025-04-15 RX ORDER — ONDANSETRON HYDROCHLORIDE 2 MG/ML
8 INJECTION, SOLUTION INTRAVENOUS
Status: DISCONTINUED | OUTPATIENT
Start: 2025-04-15 | End: 2025-04-15

## 2025-04-15 RX ADMIN — ONDANSETRON 8 MG: 8 TABLET, ORALLY DISINTEGRATING ORAL at 11:04

## 2025-04-15 RX ADMIN — ACETAMINOPHEN 650 MG: 325 TABLET ORAL at 11:04

## 2025-04-15 NOTE — ED NOTES
Patient refused blood draw, patient states she will be leaving against medical advise, risk of leaving AMA explained by KENAN George. Patient alert and orientedx4, able to ambulate independently with a steady and stable gait.

## 2025-04-15 NOTE — ED PROVIDER NOTES
"Encounter Date: 4/15/2025    SCRIBE #1 NOTE: I, Alyse Ledbetter, am scribing for, and in the presence of,  Chau George PA-C. I have scribed the following portions of the note - Other sections scribed: HPI, ROS.       History     Chief Complaint   Patient presents with    Headache     44 y/o F c/o of n/v and HA for 3 days. Pt denies CP, SOB and diarrhea.  in triage. NAD, AAOX4.     Vomiting     Morgan Caro is a 45 y.o. female, with a PMHx of thyroid disease, migraines, and HTN, who presents to the ED with nausea and vomiting (2-3x/day) that began 3 days ago. Patient also reports headache, decreased appetite, and "burning" right flank pain. Patient states she usually gets these symptoms when her thyroid "acts up". She reports she does not have a PCP or endocrinologist and comes to the ED for medication refills. Denies attempting treatment PTA, but notes soaking in a warm bath helps her symptoms occasionally. No other exacerbating or alleviating factors. Denies concerns for STIs. Denies trauma or injury. Denies hematemesis, abdominal pain, chest pain, shortness of breath, vaginal bleeding, vaginal discharge, or other associated symptoms. Patient also notes she is out of her HTN medication. Patient admits to smoking marijuana daily, denies EtOH usage. Patient has allergy to ibuprofen.     The history is provided by the patient. No  was used.     Review of patient's allergies indicates:   Allergen Reactions    Ibuprofen Hives and Edema     Past Medical History:   Diagnosis Date    Depression     Migraines     Primary hypertension 03/04/2023    Thyroid disease      History reviewed. No pertinent surgical history.  No family history on file.  Social History[1]  Review of Systems   Constitutional:  Positive for appetite change (decreased). Negative for fever.   HENT:  Negative for sore throat.    Eyes:  Negative for visual disturbance.   Respiratory:  Negative for cough and shortness of " breath.    Cardiovascular:  Negative for chest pain and leg swelling.   Gastrointestinal:  Positive for nausea and vomiting. Negative for abdominal pain and diarrhea.        (-) hematemesis    Genitourinary:  Positive for flank pain (right). Negative for dysuria, hematuria, vaginal bleeding and vaginal discharge.   Musculoskeletal:  Negative for back pain and neck pain.   Skin:  Negative for rash.   Neurological:  Positive for headaches. Negative for syncope.       Physical Exam     Initial Vitals [04/15/25 1013]   BP Pulse Resp Temp SpO2   (!) 142/77 (!) 129 18 98.8 °F (37.1 °C) 96 %      MAP       --         Physical Exam    Nursing note and vitals reviewed.  Constitutional: She appears well-developed and well-nourished.   HENT:   Head: Normocephalic and atraumatic.   Right Ear: External ear normal.   Left Ear: External ear normal.   Nose: Nose normal. Mouth/Throat: Oropharynx is clear and moist.   Eyes: Conjunctivae, EOM and lids are normal. Pupils are equal, round, and reactive to light.   Neck: Trachea normal. Neck supple.   Cardiovascular:  Normal rate, regular rhythm, S1 normal, S2 normal and normal heart sounds.     Exam reveals no gallop and no friction rub.       No murmur heard.  Pulmonary/Chest: Effort normal and breath sounds normal. No respiratory distress. She has no decreased breath sounds. She has no wheezes. She has no rhonchi. She has no rales.   Abdominal: Abdomen is soft. Bowel sounds are normal. She exhibits no distension. There is no abdominal tenderness.   No right CVA tenderness.  No left CVA tenderness. There is no rebound, no guarding, no tenderness at McBurney's point and negative Ritchie's sign.   Musculoskeletal:         General: Normal range of motion.      Cervical back: Neck supple.      Comments: Patient is able to move all extremities. 5/5 strength at upper and lower extremities.        Lymphadenopathy:     She has no cervical adenopathy.   Neurological: She is alert. She has normal  "strength. No cranial nerve deficit or sensory deficit.   Skin: Skin is warm and dry.   Psychiatric: She has a normal mood and affect.         ED Course   Procedures  Labs Reviewed   CULTURE, URINE - Abnormal       Result Value    Urine Culture 50,000 - 99,999 cfu/ml Escherichia coli (*)    WET PREP, GENITAL - Abnormal    WBC Occasional (*)     Bacteria Occasional (*)     Clue Cells None      TRICHOMONAS  None      BUDDING YEAST None      FUNGAL HYPHAE None     URINALYSIS, REFLEX TO URINE CULTURE - Abnormal    Color, UA Yellow      Appearance, UA Clear      pH, UA 6.0      Spec Grav UA 1.020      Protein, UA Trace (*)     Glucose, UA Negative      Ketones, UA Negative      Bilirubin, UA Negative      Blood, UA Negative      Nitrites, UA Negative      Urobilinogen, UA Negative      Leukocyte Esterase, UA 2+ (*)    URINALYSIS MICROSCOPIC - Abnormal    RBC, UA 2      WBC, UA 42 (*)     Bacteria, UA Rare      Microscopic Comment       DRUG SCREEN PANEL, URINE EMERGENCY - Abnormal    Benzodiazepine, Urine Negative      Methadone, Urine Negative      Cocaine, Urine Negative      Opiates, Urine Negative      Barbiturates, Urine Negative      Amphetamines, Urine Negative      THC Presumptive Positive (*)     Phencyclidine, Urine Negative      Urine Creatinine 112.3      Narrative:     This screen includes the following classes of drugs at the listed cut-off:     Benzodiazepines:        200 ng/ml   Methadone:              300 ng/ml   Cocaine metabolite:     300 ng/ml   Opiates:                300 ng/ml   Barbiturates:           200 ng/ml   Amphetamines:           1000 ng/ml   Marijuana metabs (THC): 50 ng/ml   Phencyclidine (PCP):    25 ng/ml     This is a screening test. If results do not correlate with clinical presentation, then a confirmatory send out test is advised.    This report is intended for use in clinical monitoring and management of patients. It is not intended for use in employment related drug testing."   GREY " "TOP URINE HOLD    Extra Tube Hold for add-ons.     C. TRACHOMATIS/N. GONORRHOEAE BY AMP DNA   POCT URINE PREGNANCY    POC Preg Test, Ur Negative       Acceptable Yes       EKG Readings: (Independently Interpreted)   Initial Reading: No STEMI. Previous EKG: Compared with most recent EKG Rhythm: Sinus Tachycardia. Heart Rate: 118.     ECG Results              EKG 12-lead (Final result)        Collection Time Result Time QRS Duration OHS QTC Calculation    04/15/25 10:39:32 04/16/25 12:06:42 84 445                     Final result by Interface, Lab In [facility] (04/16/25 12:06:49)                   Narrative:    Test Reason : R00.0,    Vent. Rate : 118 BPM     Atrial Rate : 118 BPM     P-R Int : 184 ms          QRS Dur :  84 ms      QT Int : 318 ms       P-R-T Axes :  75  82  59 degrees    QTcB Int : 445 ms    Sinus tachycardia  Biatrial enlargement  LVH ( Sokolow-Rodriges , Romhilt-Jiménez )  Nonspecific T wave abnormality  Abnormal ECG  When compared with ECG of 11-Dec-2023 19:14,  No significant change was found  Confirmed by Jose Grossman ([id number]) on 4/16/2025 12:06:40 PM    Referred By: AAAREFERRAL SELF           Confirmed By: Jose Grossman                                  Imaging Results    None          Medications   acetaminophen tablet 650 mg (650 mg Oral Given 4/15/25 1150)   ondansetron disintegrating tablet 8 mg (8 mg Oral Given 4/15/25 1149)     Medical Decision Making  Encounter Date: 4/15/2025    45 y.o. female presents for evaluation of ED with nausea and vomiting (2-3x/day) that began 3 days ago. Patient also reports headache, decreased appetite, and "burning" right flank pain. Patient states she usually gets these symptoms when her thyroid "acts up".  Hemodynamically stable. Afebrile. Phonating and protecting the airway spontaneously. No clinical evidence for cardiovascular instability or impending airway compromise.  Remainder of physical exam as above and unremarkable.   Prior medical records " reviewed. PMD note reviewed. Current co-morbidities considered that will impact clinical decision making include as above.       Differential diagnoses includes but is not limited to:   AAA: location inconsistent, no bruits, no history of HTN  Cholecystitis: location inconsistent, no relation with meals, negative duque's  SBO: normal BM and flatus.   Appendicitis: location inconsistent, no fever, no rebound/guarding  Mesenteric ischemia: HPI inconsistent, does not coincide with meals, other dx more likely  Kidney stone: no radiation to back or cva tenderness, no dysuria, no hematuria  Pyelonephritis: no cva tenderness, no dysuria, no fever  Pancreatitis: no history of alcohol abuse, unlikely gallstone obstructing, location inconsistent  Diverticulitis: age and location not most common, no history of diverticulitis, no fever, no wbc  TOA: no systemic symptoms, location inconsistent, pelvic exam benign  Ectopic: negative pregnancy test    Workup initiated.  Patient states that that is all she came to the emergency department for was her thyroid medication.  Patient has not followed up with the primary care in multiple months.  Patient refuses to get thyroid hormone labs drawn today.  Patient refuses all labs.  I explained to patient that I can not give her medication for her thyroid without testing its function.  Patient states that she is leaving and will follow up with her primary care doctor.  UA pending.  I gave patient medication for nausea before she left AMA.    The patient has decision making capacity . Patient has chosen to refuse medical evaluation/treatment and is able to communicate this verbally. Patient understands the relative risks, benefits, alternatives and consequences. Patient is able to reason, gives an adequate explanation of why she refuses evaluation/treatment.  This decision seems consistent with her value system and goals.       ED MEDS GIVEN:  Medications  acetaminophen tablet 650 mg (650  mg Oral Given 4/15/25 1150)  ondansetron disintegrating tablet 8 mg (8 mg Oral Given 4/15/25 1149)    Clinical Impression:  Final diagnoses:  [R00.0] Tachycardia  [R11.2] Nausea and vomiting, unspecified vomiting type (Primary)    I discussed with the patient/family the diagnosis, treatment plan, indications for return to the emergency department, and for expected follow-up. The patient/family verbalized an understanding. The patient/family is asked if there are any questions or concerns. We discuss the case, until all issues are addressed to the patient/family's satisfaction. Patient/family understands and is agreeable to the plan.   Chau George    DISCLAIMER: This note was prepared with [employer] voice recognition transcription software. Garbled syntax, mangled pronouns, and other bizarre constructions may be attributed to that software system.      Amount and/or Complexity of Data Reviewed  Labs: ordered. Decision-making details documented in ED Course.  Radiology: ordered. Decision-making details documented in ED Course.  ECG/medicine tests: ordered and independent interpretation performed. Decision-making details documented in ED Course.    Risk  OTC drugs.  Prescription drug management.            Scribe Attestation:   Scribe #1: I performed the above scribed service and the documentation accurately describes the services I performed. I attest to the accuracy of the note.                             I, Chau George PA-C, personally performed the services described in this documentation. All medical record entries made by the scribe were at my direction and in my presence. I have reviewed the chart and agree that the record reflects my personal performance and is accurate and complete.      DISCLAIMER: This note was prepared with M[employer] voice recognition transcription software. Garbled syntax, mangled pronouns, and other bizarre constructions may be attributed to that software system.    Clinical  Impression:  Final diagnoses:  [R00.0] Tachycardia  [R11.2] Nausea and vomiting, unspecified vomiting type (Primary)          ED Disposition Condition    AMA Stable                    [1]   Social History  Tobacco Use    Smoking status: Every Day     Current packs/day: 2.00     Types: Cigarettes    Smokeless tobacco: Never    Tobacco comments:     1 pack of cigarettes will last the pt 2 days.   Substance Use Topics    Alcohol use: Yes     Comment: two drinks every 3 days.    Drug use: Yes     Types: Marijuana     Comment: uses marijuasa daily         Chau George PA-C  04/18/25 0846

## 2025-04-15 NOTE — LETTER
Patient: Morgan Caro  YOB: 1980  Date: 4/15/2025 Time: 11:46 AM  Location: [facility]    Leaving the Hospital Against Medical Advice    Chart #:[id number]    This will certify that I, the undersigned,    ______________________________________________________________________    A patient in the above named medical center, having requested discharge and removal from the medical center against the advice of my attending physician(s), hereby release [facility], its physicians, officers and employees, severally and individually, from any and all liability of any nature whatsoever for any injury or harm or complication of any kind that may result directly or indirectly, by reason of my terminating my stay as a patient at [facility] and my departure from [facility], and hereby waive any and all rights of action I may now have or later acquire as a result of my voluntary departure from [facility] and the termination of my stay as a patient therein.    This release is made with the full knowledge of the danger that may result from the action which I am taking.      Date:_______________________                         ___________________________                                                                                    Patient/Legal Representative    Witness:        ____________________________                          ___________________________  Nurse                                                                        Physician

## 2025-04-15 NOTE — DISCHARGE INSTRUCTIONS
Take Zofran as needed as prescribed for nausea.  Please make an appointment to follow up with her primary care doctor and make an appointment with an endocrinologist.  Thank you for coming to our Emergency Department today. It is important to remember that some problems or medical conditions are difficult to diagnose and may not be found or addressed during your Emergency Department visit.  These conditions often start with non-specific symptoms and can only be diagnosed on follow up visits with your primary care physician or specialist when the symptoms continue or change. Please remember that all medical conditions can change, and we cannot predict how you will be feeling tomorrow or the next day. Return to the ER with any questions/concerns, new/concerning symptoms, worsening or failure to improve.       Be sure to follow up with your primary care doctor and review all labs/imaging/tests that were performed during your ER visit with them. It is very common for us to identify non-emergent incidental findings which must be followed up with your primary care physician.  Some labs/imaging/tests may be outside of the normal range, and require non-emergent follow-up and/or further investigation/treatment/procedures/testing to help diagnose/exclude/prevent complications or other potentially serious medical conditions. Some abnormalities may not have been discussed or addressed during your ER visit. Some lab results may not return during your ER visit but can be accessible by downloading the free [facility] or by visiting [email or web address] . It is important for you to review all labs/imaging/tests which are outside of the normal range with your physician.    An ER visit does not replace a primary care visit, and many screening tests or follow-up tests cannot be ordered by an ER doctor or performed by the ER. Some tests may even require pre-approval.    If you do not have a primary care doctor, you may  contact the one listed on your discharge paperwork or you may also call the [facility] Appointment Desk at [phone] , or [employer] at  [phone] to schedule an appointment, or establish care with a primary care doctor or even a specialist and to obtain information about local resources. It is important to your health that you have a primary care doctor.    Please take all medications as directed. We have done our best to select a medication for you that will treat your condition however, all medications may potentially have side-effects and it is impossible to predict which medications may give you side-effects or what those side-effects (if any) those medications may give you.  If you feel that you are having a negative effect or side-effect of any medication you should stop taking those medications immediately and seek medical attention. If you feel that you are having a life-threatening reaction call 911.        Do not drive, swim, climb to height, take a bath, operate heavy machinery, drink alcohol or take potentially sedating medications, sign any legal documents or make any important decisions for 24 hours if you have received any pain medications, sedatives or mood altering drugs during your ER visit or within 24 hours of taking them if they have been prescribed to you.     You can find additional resources for Dentists, hearing aids, durable medical equipment, low cost pharmacies and other resources at [email or web address]

## 2025-04-16 ENCOUNTER — RESULTS FOLLOW-UP (OUTPATIENT)
Dept: EMERGENCY MEDICINE | Facility: HOSPITAL | Age: 45
End: 2025-04-16
Payer: MEDICAID

## 2025-04-16 LAB
OHS QRS DURATION: 84 MS
OHS QTC CALCULATION: 445 MS

## 2025-04-16 NOTE — LETTER
Morgan Caro  [address]  [place] [address]      04/19/2025          Multiple attempts to contact you via the provided telephone number have been unsuccessful. This written correspondence is to inform you that your test results from your recent visit to [facility] have abnormal results. Please return to the Emergency Department immediately or call [phone] and ask to speak with a provider for further information.        Thank you,  Emergency Department Staff  [facility] [address]  ?  [place] [address]  ?  phone [phone] ?   ?  fax [phone]  ?  [email or web address]

## 2025-04-17 LAB — BACTERIA UR CULT: ABNORMAL

## 2025-04-19 LAB
C TRACH DNA SPEC QL NAA+PROBE: NOT DETECTED
CTGC SOURCE (OHS) ORD-325: NORMAL
N GONORRHOEA DNA UR QL NAA+PROBE: NOT DETECTED